# Patient Record
Sex: FEMALE | Race: WHITE | NOT HISPANIC OR LATINO | Employment: UNEMPLOYED | URBAN - METROPOLITAN AREA
[De-identification: names, ages, dates, MRNs, and addresses within clinical notes are randomized per-mention and may not be internally consistent; named-entity substitution may affect disease eponyms.]

---

## 2020-05-29 ENCOUNTER — OFFICE VISIT (OUTPATIENT)
Dept: SURGERY | Facility: CLINIC | Age: 25
End: 2020-05-29
Attending: COLON & RECTAL SURGERY
Payer: MEDICAID

## 2020-05-29 VITALS
BODY MASS INDEX: 21.01 KG/M2 | TEMPERATURE: 96 F | HEART RATE: 81 BPM | SYSTOLIC BLOOD PRESSURE: 105 MMHG | DIASTOLIC BLOOD PRESSURE: 58 MMHG | HEIGHT: 65 IN | WEIGHT: 126.13 LBS

## 2020-05-29 DIAGNOSIS — K51.011 ULCERATIVE PANCOLITIS WITH RECTAL BLEEDING: Primary | ICD-10-CM

## 2020-05-29 DIAGNOSIS — K52.9 INFLAMMATORY BOWEL DISEASE: ICD-10-CM

## 2020-05-29 DIAGNOSIS — Z01.818 PREOPERATIVE TESTING: Primary | ICD-10-CM

## 2020-05-29 PROCEDURE — 99203 PR OFFICE/OUTPT VISIT, NEW, LEVL III, 30-44 MIN: ICD-10-PCS | Mod: S$PBB,,, | Performed by: COLON & RECTAL SURGERY

## 2020-05-29 PROCEDURE — 99203 OFFICE O/P NEW LOW 30 MIN: CPT | Mod: S$PBB,,, | Performed by: COLON & RECTAL SURGERY

## 2020-05-29 PROCEDURE — 99999 PR PBB SHADOW E&M-NEW PATIENT-LVL IV: CPT | Mod: PBBFAC,,, | Performed by: COLON & RECTAL SURGERY

## 2020-05-29 PROCEDURE — 99204 OFFICE O/P NEW MOD 45 MIN: CPT | Mod: PBBFAC | Performed by: COLON & RECTAL SURGERY

## 2020-05-29 PROCEDURE — 99999 PR PBB SHADOW E&M-NEW PATIENT-LVL IV: ICD-10-PCS | Mod: PBBFAC,,, | Performed by: COLON & RECTAL SURGERY

## 2020-05-29 RX ORDER — NEOMYCIN SULFATE 500 MG/1
TABLET ORAL
Qty: 6 TABLET | Refills: 0 | Status: CANCELLED | OUTPATIENT
Start: 2020-05-29

## 2020-05-29 RX ORDER — BALSALAZIDE DISODIUM 750 MG/1
2250 CAPSULE ORAL 3 TIMES DAILY
Status: ON HOLD | COMMUNITY
End: 2020-11-09

## 2020-05-29 RX ORDER — PREDNISONE 50 MG/1
40 TABLET ORAL DAILY
Status: ON HOLD | COMMUNITY
End: 2020-06-15 | Stop reason: HOSPADM

## 2020-05-29 RX ORDER — METRONIDAZOLE 500 MG/1
TABLET ORAL
Qty: 3 TABLET | Refills: 0 | Status: ON HOLD | OUTPATIENT
Start: 2020-05-29 | End: 2020-06-15 | Stop reason: HOSPADM

## 2020-05-29 RX ORDER — METRONIDAZOLE 500 MG/1
TABLET ORAL
Qty: 3 TABLET | Refills: 0 | Status: CANCELLED | OUTPATIENT
Start: 2020-05-29

## 2020-05-29 RX ORDER — DICYCLOMINE HYDROCHLORIDE 20 MG/1
20 TABLET ORAL EVERY 6 HOURS
Status: ON HOLD | COMMUNITY
End: 2020-11-09

## 2020-05-29 RX ORDER — NEOMYCIN SULFATE 500 MG/1
TABLET ORAL
Qty: 6 TABLET | Refills: 0 | Status: ON HOLD | OUTPATIENT
Start: 2020-05-29 | End: 2020-06-15 | Stop reason: HOSPADM

## 2020-05-29 RX ORDER — POLYETHYLENE GLYCOL 3350 17 G/17G
POWDER, FOR SOLUTION ORAL
Qty: 238 G | Refills: 0 | Status: ON HOLD | OUTPATIENT
Start: 2020-05-29 | End: 2020-06-15 | Stop reason: HOSPADM

## 2020-05-29 RX ORDER — POLYETHYLENE GLYCOL 3350 17 G/17G
POWDER, FOR SOLUTION ORAL
Qty: 238 G | Refills: 0 | Status: CANCELLED | OUTPATIENT
Start: 2020-05-29

## 2020-05-29 NOTE — H&P (VIEW-ONLY)
CRS Office Visit History and Physical    Referring MD:   Parker Tobias Md  [address] [address]    SUBJECTIVE:     Chief Complaint: Ulcerative colitis    History of Present Illness:  The patient is new patient to this practice.   Course is as follows:  Patient is a 25 y.o. female presents for surgical management of ulcerative colitis.    Ulcerative colitis diagnosed 7 years ago.  Has been treated with multiple courses of prednisone.  Was on Remicade in the past, her insurance stopped paying for it.  Has also tried a Apriso and Imuran, which did not work per report.  Most recently on Tofacitinib and Balsalazide since 2018.  Most recent GI note states that she has self-discontinued these 2 medications and is currently on prednisone (40mg/day) and Balsalazide.  Before prednisone was having >7 BMs/day with blood and mucous in stool.  Also have cramping and nausea/emesis.  Has lost 30lbs since March. Has some trouble with control of liquid stool.  Had a prior vaginal delivery with suction and episiotomy (7 years ago)  No prior abdominal surgery. Denies smoking or EtOH.  Works for [employer].  Denies arthritis, rashes or perianal disease.  She lives with her boyfriend and 7-year-old daughter.  States that she has been with her boyfriend for a long time and he is a good source of support for her.    Most recent colonoscopy (March 2, 2020).  This demonstrated erythema, ulceration, and friability from the rectum to the splenic flexure.  Pathology from descending and sigmoid colon biopsies returned as chronic active colitis without granulomas.    Colonoscopy March 14, 2018- disease from rectum to mid descending colon  Colonoscopy January 2019- 'rectal scar tissue'    Most recent labs (4/30/3030)  HGB 10.5  Eddy cultures, Cdiff negative  Fecal calprotectin 1491    Family History of Colon Cancer / IBD: None    Review of patient's allergies indicates:  No Known Allergies    History reviewed. No pertinent past  "medical history.  History reviewed. No pertinent surgical history.  History reviewed. No pertinent family history.  Social History     Tobacco Use    Smoking status: Not on file   Substance Use Topics    Alcohol use: Not on file    Drug use: Not on file        Review of Systems:  Review of Systems   Constitutional: Positive for malaise/fatigue and weight loss.   Gastrointestinal: Positive for abdominal pain, blood in stool, diarrhea and nausea.   All other systems reviewed and are negative.      OBJECTIVE:     Vital Signs (Most Recent)  BP (!) 105/58 (BP Location: Left arm, Patient Position: Sitting, BP Method: Large (Automatic))   Pulse 81   Temp 96.4 °F (35.8 °C)   Ht 5' 5" (1.651 m)   Wt 57.2 kg (126 lb 1.7 oz)   BMI 20.98 kg/m²     Physical Exam:  General: White female in no distress   Neuro: Alert and oriented x 4.  Moves all extremities.     HEENT: No icterus.  Trachea midline  Respiratory: Respirations are even and unlabored  Cardiac: Regular rate  Abdomen:  Soft, nondistended, no palpable masses, no visible scars  Extremities: Warm dry and intact  Skin: No rashes  Anorectal:  No evidence of current or previous infection.  Digital exam with strong resting tone and squeeze.  No stool within the vault.      ASSESSMENT/PLAN:     25-year-old female with history of ulcerative colitis for 7 years.  She has tried Remicade, Imuran, Tofacitinib and Balsalazide with progression of disease.  Currently requiring 40 mg per day of prednisone with persistent symptoms.  She is not interested in any other trials of medical therapy at this time and would like to proceed with surgery.  She has been active in online support groups and has spoken with many patients who have had both proctocolectomy is with permanent ileostomy is as well as patients with J pouches.  She is interested in a J pouch at this time.  No clinical history concerning for Crohn's disease.    We discussed a 3 stage procedure for her given weight loss " and steroid requirement.  We discussed expected function with J-pouch in the best case scenario would be an average of 6 soft bowel movements per day with good control and occasional incontinence in the evening.  Also discussed that this would put her at risk for scar tissue in the pelvis which may affect her further fertility.  Discussed that if she were to become pregnant in the future we would recommend delivery with  rather than vaginal.  We also discussed the risk of being diagnosed with Crohn's in the future and how this would affect pouch function and risk for needing pouch excised.  Again, she had done a fair amount of research before meeting with me in none of this information came as a surprise to her.  We discussed the procedures themselves in detail using visual aids.    I have recommended starting with a laparoscopic total abdominal colectomy.  We discussed possible need for an open procedure.  We did discuss risks including bleeding, surgical site infection, Elizabeth stump leak, abscess, damage to surrounding structures including the ureter and small bowel, hernia, need for return to the operating room.  She did sign a surgical consent for this procedure today in clinic.  I did ask her if she had any additional questions about the procedure, which she did not.  I think it is unlikely that she will be able to wean off of steroids prior to surgery.  We will obtain an MR enterography prior to surgery to evaluate her small intestine for evidence of inflammation as well.  She will need to be seen by our enterostomal therapist for marking prior to surgery.    Delia Mehta MD  Staff Surgeon  Colon & Rectal Surgery

## 2020-05-29 NOTE — PROGRESS NOTES
CRS Office Visit History and Physical    Referring MD:   Parker Tobias Md  [address] [address]    SUBJECTIVE:     Chief Complaint: Ulcerative colitis    History of Present Illness:  The patient is new patient to this practice.   Course is as follows:  Patient is a 25 y.o. female presents for surgical management of ulcerative colitis.    Ulcerative colitis diagnosed 7 years ago.  Has been treated with multiple courses of prednisone.  Was on Remicade in the past, her insurance stopped paying for it.  Has also tried a Apriso and Imuran, which did not work per report.  Most recently on Tofacitinib and Balsalazide since 2018.  Most recent GI note states that she has self-discontinued these 2 medications and is currently on prednisone (40mg/day) and Balsalazide.  Before prednisone was having >7 BMs/day with blood and mucous in stool.  Also have cramping and nausea/emesis.  Has lost 30lbs since March. Has some trouble with control of liquid stool.  Had a prior vaginal delivery with suction and episiotomy (7 years ago)  No prior abdominal surgery. Denies smoking or EtOH.  Works for [employer].  Denies arthritis, rashes or perianal disease.  She lives with her boyfriend and 7-year-old daughter.  States that she has been with her boyfriend for a long time and he is a good source of support for her.    Most recent colonoscopy (March 2, 2020).  This demonstrated erythema, ulceration, and friability from the rectum to the splenic flexure.  Pathology from descending and sigmoid colon biopsies returned as chronic active colitis without granulomas.    Colonoscopy March 14, 2018- disease from rectum to mid descending colon  Colonoscopy January 2019- 'rectal scar tissue'    Most recent labs (4/30/3030)  HGB 10.5  Eddy cultures, Cdiff negative  Fecal calprotectin 1491    Family History of Colon Cancer / IBD: None    Review of patient's allergies indicates:  No Known Allergies    History reviewed. No pertinent past  "medical history.  History reviewed. No pertinent surgical history.  History reviewed. No pertinent family history.  Social History     Tobacco Use    Smoking status: Not on file   Substance Use Topics    Alcohol use: Not on file    Drug use: Not on file        Review of Systems:  Review of Systems   Constitutional: Positive for malaise/fatigue and weight loss.   Gastrointestinal: Positive for abdominal pain, blood in stool, diarrhea and nausea.   All other systems reviewed and are negative.      OBJECTIVE:     Vital Signs (Most Recent)  BP (!) 105/58 (BP Location: Left arm, Patient Position: Sitting, BP Method: Large (Automatic))   Pulse 81   Temp 96.4 °F (35.8 °C)   Ht 5' 5" (1.651 m)   Wt 57.2 kg (126 lb 1.7 oz)   BMI 20.98 kg/m²     Physical Exam:  General: White female in no distress   Neuro: Alert and oriented x 4.  Moves all extremities.     HEENT: No icterus.  Trachea midline  Respiratory: Respirations are even and unlabored  Cardiac: Regular rate  Abdomen:  Soft, nondistended, no palpable masses, no visible scars  Extremities: Warm dry and intact  Skin: No rashes  Anorectal:  No evidence of current or previous infection.  Digital exam with strong resting tone and squeeze.  No stool within the vault.      ASSESSMENT/PLAN:     25-year-old female with history of ulcerative colitis for 7 years.  She has tried Remicade, Imuran, Tofacitinib and Balsalazide with progression of disease.  Currently requiring 40 mg per day of prednisone with persistent symptoms.  She is not interested in any other trials of medical therapy at this time and would like to proceed with surgery.  She has been active in online support groups and has spoken with many patients who have had both proctocolectomy is with permanent ileostomy is as well as patients with J pouches.  She is interested in a J pouch at this time.  No clinical history concerning for Crohn's disease.    We discussed a 3 stage procedure for her given weight loss " and steroid requirement.  We discussed expected function with J-pouch in the best case scenario would be an average of 6 soft bowel movements per day with good control and occasional incontinence in the evening.  Also discussed that this would put her at risk for scar tissue in the pelvis which may affect her further fertility.  Discussed that if she were to become pregnant in the future we would recommend delivery with  rather than vaginal.  We also discussed the risk of being diagnosed with Crohn's in the future and how this would affect pouch function and risk for needing pouch excised.  Again, she had done a fair amount of research before meeting with me in none of this information came as a surprise to her.  We discussed the procedures themselves in detail using visual aids.    I have recommended starting with a laparoscopic total abdominal colectomy.  We discussed possible need for an open procedure.  We did discuss risks including bleeding, surgical site infection, Elizabeth stump leak, abscess, damage to surrounding structures including the ureter and small bowel, hernia, need for return to the operating room.  She did sign a surgical consent for this procedure today in clinic.  I did ask her if she had any additional questions about the procedure, which she did not.  I think it is unlikely that she will be able to wean off of steroids prior to surgery.  We will obtain an MR enterography prior to surgery to evaluate her small intestine for evidence of inflammation as well.  She will need to be seen by our enterostomal therapist for marking prior to surgery.    Delia Mehta MD  Staff Surgeon  Colon & Rectal Surgery

## 2020-05-29 NOTE — Clinical Note
Hey-She is schedule for a total colectomy w/ ileostomy w/ me on 6/11/2020.  Can you make sure she is on your schedule for marking pre-op?Thanks!Hoa

## 2020-05-29 NOTE — LETTER
May 29, 2020      Parker Tobias MD  [address]  [facility] [address]           [facility]  [address]  [facility] [address]  Phone: [phone]          Patient: Alissa Mitchell   MR Number: [id number]   YOB: 1995   Date of Visit: 5/29/2020       Dear Dr. Parker Tobias:    Thank you for referring Alissa Mitchell to me for evaluation. Attached you will find relevant portions of my assessment and plan of care.    If you have questions, please do not hesitate to call me. I look forward to following Alissa Mitchell along with you.    Sincerely,    Delia Mehta MD    Enclosure  CC:  No Recipients    If you would like to receive this communication electronically, please contact [email or web address] or [phone] to request more information on [employer] access.    For providers and/or their staff who would like to refer a patient to [facility], please contact us through our one-stop-shop provider referral line, [facility], at [phone].    If you feel you have received this communication in error or would no longer like to receive these types of communications, please e-mail [email or web address]

## 2020-06-01 ENCOUNTER — TELEPHONE (OUTPATIENT)
Dept: SURGERY | Facility: CLINIC | Age: 25
End: 2020-06-01

## 2020-06-01 DIAGNOSIS — K51.00 ULCERATIVE PANCOLITIS: ICD-10-CM

## 2020-06-01 DIAGNOSIS — K51.011 ULCERATIVE PANCOLITIS WITH RECTAL BLEEDING: Primary | ICD-10-CM

## 2020-06-01 RX ORDER — SODIUM CHLORIDE 9 MG/ML
INJECTION, SOLUTION INTRAVENOUS CONTINUOUS
Status: CANCELLED | OUTPATIENT
Start: 2020-06-01

## 2020-06-01 RX ORDER — GABAPENTIN 100 MG/1
300 CAPSULE ORAL
Status: CANCELLED | OUTPATIENT
Start: 2020-06-01

## 2020-06-01 RX ORDER — METRONIDAZOLE 500 MG/100ML
500 INJECTION, SOLUTION INTRAVENOUS
Status: CANCELLED | OUTPATIENT
Start: 2020-06-01

## 2020-06-01 RX ORDER — MUPIROCIN 20 MG/G
1 OINTMENT TOPICAL
Status: CANCELLED | OUTPATIENT
Start: 2020-06-01

## 2020-06-01 RX ORDER — SODIUM CHLORIDE 9 MG/ML
INJECTION, SOLUTION INTRAVENOUS
Status: CANCELLED | OUTPATIENT
Start: 2020-06-01

## 2020-06-01 RX ORDER — ACETAMINOPHEN 650 MG/20.3ML
975 LIQUID ORAL
Status: CANCELLED | OUTPATIENT
Start: 2020-06-01

## 2020-06-01 RX ORDER — TRIPROLIDINE/PSEUDOEPHEDRINE 2.5MG-60MG
600 TABLET ORAL
Status: CANCELLED | OUTPATIENT
Start: 2020-06-01

## 2020-06-01 RX ORDER — GABAPENTIN 100 MG/1
300 CAPSULE ORAL 3 TIMES DAILY
Status: CANCELLED | OUTPATIENT
Start: 2020-06-01

## 2020-06-01 RX ORDER — LIDOCAINE HYDROCHLORIDE 10 MG/ML
1 INJECTION, SOLUTION EPIDURAL; INFILTRATION; INTRACAUDAL; PERINEURAL
Status: CANCELLED | OUTPATIENT
Start: 2020-06-01

## 2020-06-01 RX ORDER — HEPARIN SODIUM 5000 [USP'U]/ML
5000 INJECTION, SOLUTION INTRAVENOUS; SUBCUTANEOUS EVERY 8 HOURS
Status: CANCELLED | OUTPATIENT
Start: 2020-06-01 | End: 2020-06-01

## 2020-06-09 ENCOUNTER — HOSPITAL ENCOUNTER (OUTPATIENT)
Dept: RADIOLOGY | Facility: HOSPITAL | Age: 25
Discharge: HOME | End: 2020-06-09
Attending: COLON & RECTAL SURGERY
Payer: MEDICAID

## 2020-06-09 ENCOUNTER — LAB VISIT (OUTPATIENT)
Dept: INTERNAL MEDICINE | Facility: CLINIC | Age: 25
End: 2020-06-09
Attending: COLON & RECTAL SURGERY
Payer: MEDICAID

## 2020-06-09 DIAGNOSIS — K52.9 INFLAMMATORY BOWEL DISEASE: ICD-10-CM

## 2020-06-09 DIAGNOSIS — Z01.818 PREOPERATIVE TESTING: ICD-10-CM

## 2020-06-09 PROCEDURE — 72197 MRI PELVIS W/O & W/DYE: CPT | Mod: TC

## 2020-06-09 PROCEDURE — 72197 MRI ENTEROGRAPHY: ICD-10-PCS | Mod: 26,,, | Performed by: RADIOLOGY

## 2020-06-09 PROCEDURE — A9585 GADOBUTROL INJECTION: HCPCS | Performed by: COLON & RECTAL SURGERY

## 2020-06-09 PROCEDURE — 72197 MRI PELVIS W/O & W/DYE: CPT | Mod: 26,,, | Performed by: RADIOLOGY

## 2020-06-09 PROCEDURE — 74183 MRI ENTEROGRAPHY: ICD-10-PCS | Mod: 26,,, | Performed by: RADIOLOGY

## 2020-06-09 PROCEDURE — U0003 INFECTIOUS AGENT DETECTION BY NUCLEIC ACID (DNA OR RNA); SEVERE ACUTE RESPIRATORY SYNDROME CORONAVIRUS 2 (SARS-COV-2) (CORONAVIRUS DISEASE [COVID-19]), AMPLIFIED PROBE TECHNIQUE, MAKING USE OF HIGH THROUGHPUT TECHNOLOGIES AS DESCRIBED BY CMS-2020-01-R: HCPCS

## 2020-06-09 PROCEDURE — 74183 MRI ABD W/O CNTR FLWD CNTR: CPT | Mod: 26,,, | Performed by: RADIOLOGY

## 2020-06-09 PROCEDURE — 25500020 PHARM REV CODE 255: Performed by: COLON & RECTAL SURGERY

## 2020-06-09 RX ORDER — GADOBUTROL 604.72 MG/ML
10 INJECTION INTRAVENOUS
Status: COMPLETED | OUTPATIENT
Start: 2020-06-09 | End: 2020-06-09

## 2020-06-09 RX ADMIN — GADOBUTROL 10 ML: 604.72 INJECTION INTRAVENOUS at 11:06

## 2020-06-10 ENCOUNTER — TELEPHONE (OUTPATIENT)
Dept: SURGERY | Facility: CLINIC | Age: 25
End: 2020-06-10

## 2020-06-10 LAB — SARS-COV-2 RNA RESP QL NAA+PROBE: NOT DETECTED

## 2020-06-10 RX ORDER — PROMETHAZINE HYDROCHLORIDE 25 MG/1
25 TABLET ORAL EVERY 6 HOURS PRN
Status: ON HOLD | COMMUNITY
End: 2020-11-09

## 2020-06-10 NOTE — PRE-PROCEDURE INSTRUCTIONS
PREOP INSTRUCTIONS:No solid food ,milk or milk products for 8 hours prior to procedure.Clear liquids are allowed up to 2 hours before procedure.Clear liquids are:water,apple juice,gatorade & powerade.Shower instructions as well as directions to the Day of Surgery Center were given.Patient encouraged to wear loose fitting,comfortable clothing.Medication instructions for pm prior to and am of procedure reviewed.Instructed patient to avoid taking vitamins,supplements,aspirin and ibuprofen the morning of surgery. Patient stated an understanding.Patient instructed to take temperature the night before surgery as well as the morning of surgery and to notify [facility] at [phone] if it is 100.4 or above.Patient also informed of the current visitor policy and advised patient that one visitor may accompany them into the hospital and wait (socially distanced) .When they enter the hospital both patient and visitor will have their temperature checked,provided a mask and provided assistance to their destination. Inpatients are allowed 1 visitor/day from 10 am until 6 pm.     Covid screening completed 6/9/2020 and results are pending.  Patient denies any side effects or issues with anesthesia or sedation.    [facility]olph [facility] WILL BE PROVIDING TRANSPORTATION HOME UPON DISCHARGE.    Pt will report to Wound Care for marking and then to [facility]

## 2020-06-11 ENCOUNTER — ANESTHESIA EVENT (OUTPATIENT)
Dept: SURGERY | Facility: HOSPITAL | Age: 25
DRG: 331 | End: 2020-06-11
Payer: MEDICAID

## 2020-06-11 ENCOUNTER — HOSPITAL ENCOUNTER (INPATIENT)
Facility: HOSPITAL | Age: 25
LOS: 4 days | Discharge: HOME | DRG: 331 | End: 2020-06-15
Attending: COLON & RECTAL SURGERY | Admitting: COLON & RECTAL SURGERY
Payer: MEDICAID

## 2020-06-11 ENCOUNTER — ANESTHESIA (OUTPATIENT)
Dept: SURGERY | Facility: HOSPITAL | Age: 25
DRG: 331 | End: 2020-06-11
Payer: MEDICAID

## 2020-06-11 ENCOUNTER — OFFICE VISIT (OUTPATIENT)
Dept: WOUND CARE | Facility: CLINIC | Age: 25
DRG: 331 | End: 2020-06-11
Payer: MEDICAID

## 2020-06-11 DIAGNOSIS — K51.011 ULCERATIVE PANCOLITIS WITH RECTAL BLEEDING: ICD-10-CM

## 2020-06-11 DIAGNOSIS — K51.00 ULCERATIVE PANCOLITIS: ICD-10-CM

## 2020-06-11 DIAGNOSIS — Z43.2 ATTENTION TO ILEOSTOMY: Primary | ICD-10-CM

## 2020-06-11 DIAGNOSIS — Z71.89 ENCOUNTER FOR OSTOMY CARE EDUCATION: ICD-10-CM

## 2020-06-11 PROBLEM — K51.90 ULCERATIVE COLITIS: Status: ACTIVE | Noted: 2020-06-11

## 2020-06-11 LAB
B-HCG UR QL: NEGATIVE
CTP QC/QA: YES

## 2020-06-11 PROCEDURE — 63600175 PHARM REV CODE 636 W HCPCS: Performed by: NURSE PRACTITIONER

## 2020-06-11 PROCEDURE — 99024 PR POST-OP FOLLOW-UP VISIT: ICD-10-PCS | Mod: ,,, | Performed by: CLINICAL NURSE SPECIALIST

## 2020-06-11 PROCEDURE — 36000711: Performed by: COLON & RECTAL SURGERY

## 2020-06-11 PROCEDURE — 71000016 HC POSTOP RECOV ADDL HR: Performed by: COLON & RECTAL SURGERY

## 2020-06-11 PROCEDURE — 25000003 PHARM REV CODE 250: Performed by: NURSE ANESTHETIST, CERTIFIED REGISTERED

## 2020-06-11 PROCEDURE — 63600175 PHARM REV CODE 636 W HCPCS: Performed by: STUDENT IN AN ORGANIZED HEALTH CARE EDUCATION/TRAINING PROGRAM

## 2020-06-11 PROCEDURE — C1765 ADHESION BARRIER: HCPCS | Performed by: COLON & RECTAL SURGERY

## 2020-06-11 PROCEDURE — 88307 TISSUE EXAM BY PATHOLOGIST: CPT | Mod: 26,,, | Performed by: PATHOLOGY

## 2020-06-11 PROCEDURE — D9220A PRA ANESTHESIA: ICD-10-PCS | Mod: ANES,,, | Performed by: ANESTHESIOLOGY

## 2020-06-11 PROCEDURE — 63600175 PHARM REV CODE 636 W HCPCS: Performed by: NURSE ANESTHETIST, CERTIFIED REGISTERED

## 2020-06-11 PROCEDURE — 27100025 HC TUBING, SET FLUID WARMER: Performed by: ANESTHESIOLOGY

## 2020-06-11 PROCEDURE — 37000008 HC ANESTHESIA 1ST 15 MINUTES: Performed by: COLON & RECTAL SURGERY

## 2020-06-11 PROCEDURE — 44150 PR REMOVAL COLON/ILEOSTOMY: ICD-10-PCS | Mod: ,,, | Performed by: COLON & RECTAL SURGERY

## 2020-06-11 PROCEDURE — 81025 URINE PREGNANCY TEST: CPT | Performed by: COLON & RECTAL SURGERY

## 2020-06-11 PROCEDURE — 63600175 PHARM REV CODE 636 W HCPCS: Performed by: ANESTHESIOLOGY

## 2020-06-11 PROCEDURE — 25000003 PHARM REV CODE 250: Performed by: STUDENT IN AN ORGANIZED HEALTH CARE EDUCATION/TRAINING PROGRAM

## 2020-06-11 PROCEDURE — 71000033 HC RECOVERY, INTIAL HOUR: Performed by: COLON & RECTAL SURGERY

## 2020-06-11 PROCEDURE — 37000009 HC ANESTHESIA EA ADD 15 MINS: Performed by: COLON & RECTAL SURGERY

## 2020-06-11 PROCEDURE — 44150 REMOVAL OF COLON: CPT | Mod: ,,, | Performed by: COLON & RECTAL SURGERY

## 2020-06-11 PROCEDURE — 36000710: Performed by: COLON & RECTAL SURGERY

## 2020-06-11 PROCEDURE — 27201423 OPTIME MED/SURG SUP & DEVICES STERILE SUPPLY: Performed by: COLON & RECTAL SURGERY

## 2020-06-11 PROCEDURE — 88307 PR  SURG PATH,LEVEL V: ICD-10-PCS | Mod: 26,,, | Performed by: PATHOLOGY

## 2020-06-11 PROCEDURE — 25000003 PHARM REV CODE 250: Performed by: ANESTHESIOLOGY

## 2020-06-11 PROCEDURE — 88307 TISSUE EXAM BY PATHOLOGIST: CPT | Performed by: PATHOLOGY

## 2020-06-11 PROCEDURE — 71000015 HC POSTOP RECOV 1ST HR: Performed by: COLON & RECTAL SURGERY

## 2020-06-11 PROCEDURE — 99999 PR PBB SHADOW E&M-EST. PATIENT-LVL I: CPT | Mod: PBBFAC,,, | Performed by: CLINICAL NURSE SPECIALIST

## 2020-06-11 PROCEDURE — D9220A PRA ANESTHESIA: Mod: CRNA,,, | Performed by: NURSE ANESTHETIST, CERTIFIED REGISTERED

## 2020-06-11 PROCEDURE — 20600001 HC STEP DOWN PRIVATE ROOM

## 2020-06-11 PROCEDURE — 25000003 PHARM REV CODE 250: Performed by: NURSE PRACTITIONER

## 2020-06-11 PROCEDURE — D9220A PRA ANESTHESIA: ICD-10-PCS | Mod: CRNA,,, | Performed by: NURSE ANESTHETIST, CERTIFIED REGISTERED

## 2020-06-11 PROCEDURE — S0030 INJECTION, METRONIDAZOLE: HCPCS | Performed by: NURSE PRACTITIONER

## 2020-06-11 PROCEDURE — 27201037 HC PRESSURE MONITORING SET UP

## 2020-06-11 PROCEDURE — 99024 POSTOP FOLLOW-UP VISIT: CPT | Mod: ,,, | Performed by: CLINICAL NURSE SPECIALIST

## 2020-06-11 PROCEDURE — D9220A PRA ANESTHESIA: Mod: ANES,,, | Performed by: ANESTHESIOLOGY

## 2020-06-11 PROCEDURE — 99211 OFF/OP EST MAY X REQ PHY/QHP: CPT | Mod: PBBFAC,25 | Performed by: CLINICAL NURSE SPECIALIST

## 2020-06-11 PROCEDURE — C9290 INJ, BUPIVACAINE LIPOSOME: HCPCS | Performed by: COLON & RECTAL SURGERY

## 2020-06-11 PROCEDURE — 99999 PR PBB SHADOW E&M-EST. PATIENT-LVL I: ICD-10-PCS | Mod: PBBFAC,,, | Performed by: CLINICAL NURSE SPECIALIST

## 2020-06-11 PROCEDURE — 63600175 PHARM REV CODE 636 W HCPCS: Performed by: COLON & RECTAL SURGERY

## 2020-06-11 DEVICE — MEMBRANE SEPRAFILM 5 X 6: Type: IMPLANTABLE DEVICE | Site: ABDOMEN | Status: FUNCTIONAL

## 2020-06-11 RX ORDER — GABAPENTIN 300 MG/1
300 CAPSULE ORAL 3 TIMES DAILY
Status: DISCONTINUED | OUTPATIENT
Start: 2020-06-11 | End: 2020-06-15 | Stop reason: HOSPADM

## 2020-06-11 RX ORDER — LIDOCAINE HYDROCHLORIDE 10 MG/ML
1 INJECTION, SOLUTION EPIDURAL; INFILTRATION; INTRACAUDAL; PERINEURAL
Status: COMPLETED | OUTPATIENT
Start: 2020-06-11 | End: 2020-06-11

## 2020-06-11 RX ORDER — PHENYLEPHRINE HYDROCHLORIDE 10 MG/ML
INJECTION INTRAVENOUS
Status: DISCONTINUED | OUTPATIENT
Start: 2020-06-11 | End: 2020-06-11

## 2020-06-11 RX ORDER — MUPIROCIN 20 MG/G
1 OINTMENT TOPICAL
Status: COMPLETED | OUTPATIENT
Start: 2020-06-11 | End: 2020-06-11

## 2020-06-11 RX ORDER — MIDAZOLAM HYDROCHLORIDE 1 MG/ML
INJECTION, SOLUTION INTRAMUSCULAR; INTRAVENOUS
Status: DISCONTINUED | OUTPATIENT
Start: 2020-06-11 | End: 2020-06-11

## 2020-06-11 RX ORDER — PROPOFOL 10 MG/ML
INJECTION, EMULSION INTRAVENOUS
Status: DISCONTINUED | OUTPATIENT
Start: 2020-06-11 | End: 2020-06-11

## 2020-06-11 RX ORDER — SODIUM CHLORIDE 9 MG/ML
INJECTION, SOLUTION INTRAVENOUS CONTINUOUS
Status: DISCONTINUED | OUTPATIENT
Start: 2020-06-11 | End: 2020-06-15 | Stop reason: HOSPADM

## 2020-06-11 RX ORDER — SODIUM CHLORIDE 9 MG/ML
INJECTION, SOLUTION INTRAVENOUS CONTINUOUS
Status: DISCONTINUED | OUTPATIENT
Start: 2020-06-11 | End: 2020-06-12

## 2020-06-11 RX ORDER — ROCURONIUM BROMIDE 10 MG/ML
INJECTION, SOLUTION INTRAVENOUS
Status: DISCONTINUED | OUTPATIENT
Start: 2020-06-11 | End: 2020-06-11

## 2020-06-11 RX ORDER — HYDROMORPHONE HYDROCHLORIDE 1 MG/ML
0.2 INJECTION, SOLUTION INTRAMUSCULAR; INTRAVENOUS; SUBCUTANEOUS EVERY 5 MIN PRN
Status: COMPLETED | OUTPATIENT
Start: 2020-06-11 | End: 2020-06-11

## 2020-06-11 RX ORDER — ONDANSETRON 2 MG/ML
4 INJECTION INTRAMUSCULAR; INTRAVENOUS ONCE AS NEEDED
Status: COMPLETED | OUTPATIENT
Start: 2020-06-11 | End: 2020-06-11

## 2020-06-11 RX ORDER — SODIUM CHLORIDE 9 MG/ML
INJECTION, SOLUTION INTRAVENOUS
Status: COMPLETED | OUTPATIENT
Start: 2020-06-11 | End: 2020-06-11

## 2020-06-11 RX ORDER — SODIUM CHLORIDE 0.9 % (FLUSH) 0.9 %
10 SYRINGE (ML) INJECTION
Status: DISCONTINUED | OUTPATIENT
Start: 2020-06-11 | End: 2020-06-15 | Stop reason: HOSPADM

## 2020-06-11 RX ORDER — TRAMADOL HYDROCHLORIDE 50 MG/1
50 TABLET ORAL EVERY 6 HOURS PRN
Status: DISCONTINUED | OUTPATIENT
Start: 2020-06-11 | End: 2020-06-15 | Stop reason: HOSPADM

## 2020-06-11 RX ORDER — SODIUM CHLORIDE 0.9 % (FLUSH) 0.9 %
3 SYRINGE (ML) INJECTION
Status: DISCONTINUED | OUTPATIENT
Start: 2020-06-11 | End: 2020-06-11 | Stop reason: HOSPADM

## 2020-06-11 RX ORDER — IBUPROFEN 400 MG/1
800 TABLET ORAL EVERY 8 HOURS
Status: DISCONTINUED | OUTPATIENT
Start: 2020-06-12 | End: 2020-06-15 | Stop reason: HOSPADM

## 2020-06-11 RX ORDER — ACETAMINOPHEN 10 MG/ML
1000 INJECTION, SOLUTION INTRAVENOUS EVERY 8 HOURS
Status: COMPLETED | OUTPATIENT
Start: 2020-06-11 | End: 2020-06-12

## 2020-06-11 RX ORDER — OXYCODONE HYDROCHLORIDE 5 MG/1
5 TABLET ORAL EVERY 6 HOURS PRN
Status: DISCONTINUED | OUTPATIENT
Start: 2020-06-11 | End: 2020-06-15 | Stop reason: HOSPADM

## 2020-06-11 RX ORDER — METRONIDAZOLE 500 MG/100ML
500 INJECTION, SOLUTION INTRAVENOUS
Status: COMPLETED | OUTPATIENT
Start: 2020-06-11 | End: 2020-06-11

## 2020-06-11 RX ORDER — ACETAMINOPHEN 500 MG
1000 TABLET ORAL EVERY 8 HOURS
Status: DISCONTINUED | OUTPATIENT
Start: 2020-06-12 | End: 2020-06-15 | Stop reason: HOSPADM

## 2020-06-11 RX ORDER — ACETAMINOPHEN 650 MG/20.3ML
975 LIQUID ORAL
Status: COMPLETED | OUTPATIENT
Start: 2020-06-11 | End: 2020-06-11

## 2020-06-11 RX ORDER — HEPARIN SODIUM 5000 [USP'U]/ML
5000 INJECTION, SOLUTION INTRAVENOUS; SUBCUTANEOUS EVERY 8 HOURS
Status: COMPLETED | OUTPATIENT
Start: 2020-06-11 | End: 2020-06-11

## 2020-06-11 RX ORDER — DEXMEDETOMIDINE HYDROCHLORIDE 100 UG/ML
INJECTION, SOLUTION INTRAVENOUS
Status: DISCONTINUED | OUTPATIENT
Start: 2020-06-11 | End: 2020-06-11

## 2020-06-11 RX ORDER — TRIPROLIDINE/PSEUDOEPHEDRINE 2.5MG-60MG
600 TABLET ORAL
Status: COMPLETED | OUTPATIENT
Start: 2020-06-11 | End: 2020-06-11

## 2020-06-11 RX ORDER — MUPIROCIN 20 MG/G
OINTMENT TOPICAL 2 TIMES DAILY
Status: DISCONTINUED | OUTPATIENT
Start: 2020-06-11 | End: 2020-06-15 | Stop reason: HOSPADM

## 2020-06-11 RX ORDER — ONDANSETRON 2 MG/ML
4 INJECTION INTRAMUSCULAR; INTRAVENOUS EVERY 6 HOURS PRN
Status: DISCONTINUED | OUTPATIENT
Start: 2020-06-11 | End: 2020-06-15 | Stop reason: HOSPADM

## 2020-06-11 RX ORDER — LIDOCAINE HYDROCHLORIDE ANHYDROUS AND DEXTROSE MONOHYDRATE .8; 5 G/100ML; G/100ML
INJECTION, SOLUTION INTRAVENOUS CONTINUOUS PRN
Status: DISCONTINUED | OUTPATIENT
Start: 2020-06-11 | End: 2020-06-11

## 2020-06-11 RX ORDER — LIDOCAINE HCL/PF 100 MG/5ML
SYRINGE (ML) INTRAVENOUS
Status: DISCONTINUED | OUTPATIENT
Start: 2020-06-11 | End: 2020-06-11

## 2020-06-11 RX ORDER — ONDANSETRON 2 MG/ML
INJECTION INTRAMUSCULAR; INTRAVENOUS
Status: DISCONTINUED | OUTPATIENT
Start: 2020-06-11 | End: 2020-06-11

## 2020-06-11 RX ORDER — OXYCODONE HYDROCHLORIDE 10 MG/1
10 TABLET ORAL EVERY 4 HOURS PRN
Status: DISCONTINUED | OUTPATIENT
Start: 2020-06-11 | End: 2020-06-15 | Stop reason: HOSPADM

## 2020-06-11 RX ORDER — SODIUM CHLORIDE 9 MG/ML
INJECTION, SOLUTION INTRAVENOUS CONTINUOUS PRN
Status: DISCONTINUED | OUTPATIENT
Start: 2020-06-11 | End: 2020-06-11

## 2020-06-11 RX ORDER — KETAMINE HCL IN 0.9 % NACL 50 MG/5 ML
SYRINGE (ML) INTRAVENOUS
Status: DISCONTINUED | OUTPATIENT
Start: 2020-06-11 | End: 2020-06-11

## 2020-06-11 RX ORDER — GABAPENTIN 300 MG/1
300 CAPSULE ORAL
Status: COMPLETED | OUTPATIENT
Start: 2020-06-11 | End: 2020-06-11

## 2020-06-11 RX ORDER — DEXAMETHASONE SODIUM PHOSPHATE 4 MG/ML
INJECTION, SOLUTION INTRA-ARTICULAR; INTRALESIONAL; INTRAMUSCULAR; INTRAVENOUS; SOFT TISSUE
Status: DISCONTINUED | OUTPATIENT
Start: 2020-06-11 | End: 2020-06-11

## 2020-06-11 RX ORDER — FENTANYL CITRATE 50 UG/ML
INJECTION, SOLUTION INTRAMUSCULAR; INTRAVENOUS
Status: DISCONTINUED | OUTPATIENT
Start: 2020-06-11 | End: 2020-06-11

## 2020-06-11 RX ADMIN — METRONIDAZOLE 500 MG: 500 SOLUTION INTRAVENOUS at 04:06

## 2020-06-11 RX ADMIN — DEXMEDETOMIDINE HYDROCHLORIDE 24 MCG: 100 INJECTION, SOLUTION, CONCENTRATE INTRAVENOUS at 07:06

## 2020-06-11 RX ADMIN — HYDROMORPHONE HYDROCHLORIDE 0.2 MG: 1 INJECTION, SOLUTION INTRAMUSCULAR; INTRAVENOUS; SUBCUTANEOUS at 08:06

## 2020-06-11 RX ADMIN — SODIUM CHLORIDE: 0.9 INJECTION, SOLUTION INTRAVENOUS at 08:06

## 2020-06-11 RX ADMIN — PHENYLEPHRINE HYDROCHLORIDE 100 MCG: 10 INJECTION INTRAVENOUS at 06:06

## 2020-06-11 RX ADMIN — FENTANYL CITRATE 100 MCG: 50 INJECTION, SOLUTION INTRAMUSCULAR; INTRAVENOUS at 03:06

## 2020-06-11 RX ADMIN — ONDANSETRON 4 MG: 2 INJECTION, SOLUTION INTRAMUSCULAR; INTRAVENOUS at 05:06

## 2020-06-11 RX ADMIN — GABAPENTIN 300 MG: 300 CAPSULE ORAL at 07:06

## 2020-06-11 RX ADMIN — SODIUM CHLORIDE: 0.9 INJECTION, SOLUTION INTRAVENOUS at 03:06

## 2020-06-11 RX ADMIN — GABAPENTIN 300 MG: 300 CAPSULE ORAL at 01:06

## 2020-06-11 RX ADMIN — MUPIROCIN: 20 OINTMENT TOPICAL at 08:06

## 2020-06-11 RX ADMIN — HYDROMORPHONE HYDROCHLORIDE 0.2 MG: 1 INJECTION, SOLUTION INTRAMUSCULAR; INTRAVENOUS; SUBCUTANEOUS at 07:06

## 2020-06-11 RX ADMIN — SODIUM CHLORIDE, SODIUM GLUCONATE, SODIUM ACETATE, POTASSIUM CHLORIDE, MAGNESIUM CHLORIDE, SODIUM PHOSPHATE, DIBASIC, AND POTASSIUM PHOSPHATE: .53; .5; .37; .037; .03; .012; .00082 INJECTION, SOLUTION INTRAVENOUS at 05:06

## 2020-06-11 RX ADMIN — PROMETHAZINE HYDROCHLORIDE 6.25 MG: 25 INJECTION INTRAMUSCULAR; INTRAVENOUS at 08:06

## 2020-06-11 RX ADMIN — SODIUM CHLORIDE: 0.9 INJECTION, SOLUTION INTRAVENOUS at 01:06

## 2020-06-11 RX ADMIN — Medication 15 MG: at 04:06

## 2020-06-11 RX ADMIN — ROCURONIUM BROMIDE 50 MG: 10 INJECTION, SOLUTION INTRAVENOUS at 03:06

## 2020-06-11 RX ADMIN — LIDOCAINE HYDROCHLORIDE 0.3 MG: 10 INJECTION, SOLUTION EPIDURAL; INFILTRATION; INTRACAUDAL; PERINEURAL at 01:06

## 2020-06-11 RX ADMIN — HEPARIN SODIUM 5000 UNITS: 5000 INJECTION INTRAVENOUS; SUBCUTANEOUS at 03:06

## 2020-06-11 RX ADMIN — MIDAZOLAM HYDROCHLORIDE 2 MG: 1 INJECTION, SOLUTION INTRAMUSCULAR; INTRAVENOUS at 03:06

## 2020-06-11 RX ADMIN — LIDOCAINE HYDROCHLORIDE 0.02 MG/KG/MIN: 8 INJECTION, SOLUTION INTRAVENOUS at 03:06

## 2020-06-11 RX ADMIN — ONDANSETRON 4 MG: 2 INJECTION, SOLUTION INTRAMUSCULAR; INTRAVENOUS at 07:06

## 2020-06-11 RX ADMIN — SODIUM CHLORIDE, SODIUM GLUCONATE, SODIUM ACETATE, POTASSIUM CHLORIDE, MAGNESIUM CHLORIDE, SODIUM PHOSPHATE, DIBASIC, AND POTASSIUM PHOSPHATE: .53; .5; .37; .037; .03; .012; .00082 INJECTION, SOLUTION INTRAVENOUS at 03:06

## 2020-06-11 RX ADMIN — Medication 10 MG: at 04:06

## 2020-06-11 RX ADMIN — SUGAMMADEX 200 MG: 100 INJECTION, SOLUTION INTRAVENOUS at 06:06

## 2020-06-11 RX ADMIN — ROCURONIUM BROMIDE 10 MG: 10 INJECTION, SOLUTION INTRAVENOUS at 05:06

## 2020-06-11 RX ADMIN — LIDOCAINE HYDROCHLORIDE 100 MG: 20 INJECTION, SOLUTION INTRAVENOUS at 03:06

## 2020-06-11 RX ADMIN — ACETAMINOPHEN 976.6 MG: 160 SOLUTION ORAL at 01:06

## 2020-06-11 RX ADMIN — IBUPROFEN 600 MG: 100 SUSPENSION ORAL at 01:06

## 2020-06-11 RX ADMIN — Medication 15 MG: at 03:06

## 2020-06-11 RX ADMIN — ACETAMINOPHEN 1000 MG: 10 INJECTION, SOLUTION INTRAVENOUS at 08:06

## 2020-06-11 RX ADMIN — OXYCODONE HYDROCHLORIDE 10 MG: 10 TABLET ORAL at 07:06

## 2020-06-11 RX ADMIN — MUPIROCIN 1 G: 20 OINTMENT TOPICAL at 01:06

## 2020-06-11 RX ADMIN — CEFTRIAXONE 2 G: 2 INJECTION, SOLUTION INTRAVENOUS at 01:06

## 2020-06-11 RX ADMIN — OXYCODONE HYDROCHLORIDE 10 MG: 10 TABLET ORAL at 11:06

## 2020-06-11 RX ADMIN — IBUPROFEN 800 MG: 800 INJECTION INTRAVENOUS at 10:06

## 2020-06-11 RX ADMIN — Medication 10 MG: at 05:06

## 2020-06-11 RX ADMIN — PROPOFOL 200 MG: 10 INJECTION, EMULSION INTRAVENOUS at 03:06

## 2020-06-11 RX ADMIN — ROCURONIUM BROMIDE 10 MG: 10 INJECTION, SOLUTION INTRAVENOUS at 04:06

## 2020-06-11 RX ADMIN — DEXAMETHASONE SODIUM PHOSPHATE 8 MG: 4 INJECTION, SOLUTION INTRAMUSCULAR; INTRAVENOUS at 04:06

## 2020-06-11 NOTE — PLAN OF CARE
Patient assigned to this writer by charge nurse. The patient was  escorted to [facility] room 19. The patient is currently  changing into a hospital gown. This writer is completing a chart review and reviewing MD orders.

## 2020-06-11 NOTE — PROGRESS NOTES
Pre op Ostomy marking:    This patient was seen today per request  in preparation for upcoming ostomy surgery on 6/11/20  Stoma marking/siting was performed per protocol and patient marked with a permanent marker and skin staining with silver nitrate.   Lives in [place]   The proposed surgery was discussed and patient educated on expected postoperative course and expectations. The patient was allowed to ask questions and was also given pre-op information kit from  the [employer] for review at home prior to surgery.

## 2020-06-11 NOTE — ANESTHESIA PREPROCEDURE EVALUATION
06/11/2020  Pre-operative evaluation for Procedure(s) (LRB):  COLECTOMY, TOTAL, LAPAROSCOPIC, ERAS high (N/A)    Alissa Mitchell is a 25 y.o. female     Patient Active Problem List   Diagnosis    Ulcerative colitis    Ulcerative pancolitis       Review of patient's allergies indicates:  No Known Allergies    No current facility-administered medications on file prior to encounter.      Current Outpatient Medications on File Prior to Encounter   Medication Sig Dispense Refill    balsalazide (COLAZAL) 750 mg capsule Take 2,250 mg by mouth 3 (three) times daily.      dicyclomine (BENTYL) 20 mg tablet Take 20 mg by mouth every 6 (six) hours.      predniSONE (DELTASONE) 50 MG Tab Take 40 mg by mouth once daily.       promethazine (PHENERGAN) 25 MG tablet Take 25 mg by mouth every 6 (six) hours as needed for Nausea.         History reviewed. No pertinent surgical history.    Social History     Socioeconomic History    Marital status: Single     Spouse name: Not on file    Number of children: Not on file    Years of education: Not on file    Highest education level: Not on file   Occupational History    Not on file   Social Needs    Financial resource strain: Not on file    Food insecurity:     Worry: Not on file     Inability: Not on file    Transportation needs:     Medical: Not on file     Non-medical: Not on file   Tobacco Use    Smoking status: Not on file   Substance and Sexual Activity    Alcohol use: Not on file    Drug use: Not on file    Sexual activity: Not on file   Lifestyle    Physical activity:     Days per week: Not on file     Minutes per session: Not on file    Stress: Not on file   Relationships    Social connections:     Talks on phone: Not on file     Gets together: Not on file     Attends Confucianism service: Not on file     Active member of club or organization: Not on file      Attends meetings of clubs or organizations: Not on file     Relationship status: Not on file   Other Topics Concern    Not on file   Social History Narrative    Not on file         CBC:   Recent Labs     20  0918   WBC 12.23   RBC 4.48   HGB 11.6*   HCT 39.0      MCV 87   MCH 25.9*   MCHC 29.7*       CMP:   Recent Labs     20  0918      K 4.0      CO2 27   BUN 11   CREATININE 0.8   GLU 79   CALCIUM 10.0   ALBUMIN 4.2   PROT 7.8   ALKPHOS 45*   ALT 10   AST 10   BILITOT 0.3       INR  No results for input(s): PT, INR, PROTIME, APTT in the last 72 hours.        Diagnostic Studies:      EKD Echo:  No results found for this or any previous visit.      Anesthesia Evaluation    I have reviewed the Patient Summary Reports.   I have reviewed the NPO Status.      Review of Systems  Anesthesia Hx:  History of prior surgery of interest to airway management or planning: Denies Family Hx of Anesthesia complications.   Denies Personal Hx of Anesthesia complications.       Physical Exam  General:  Well nourished    Airway/Jaw/Neck:  Airway Findings: Mouth Opening: Normal Tongue: Normal  General Airway Assessment: Adult  Mallampati: II  TM Distance: Normal, at least 6 cm      Dental:  Dental Findings: In tact   Chest/Lungs:  Chest/Lungs Findings: Clear to auscultation, Normal Respiratory Rate         Mental Status:  Mental Status Findings:  Cooperative, Alert and Oriented         Anesthesia Plan  Type of Anesthesia, risks & benefits discussed:  Anesthesia Type:  general  Patient's Preference:   Intra-op Monitoring Plan: standard ASA monitors  Intra-op Monitoring Plan Comments:   Post Op Pain Control Plan: multimodal analgesia  Post Op Pain Control Plan Comments:   Induction:   IV  Beta Blocker:  Patient is not currently on a Beta-Blocker (No further documentation required).       Informed Consent: Patient understands risks and agrees with Anesthesia plan.  Questions answered. Anesthesia  consent signed with patient.  ASA Score: 3     Day of Surgery Review of History & Physical:    H&P update referred to the surgeon.         Ready For Surgery From Anesthesia Perspective.

## 2020-06-11 NOTE — INTERVAL H&P NOTE
The patient has been examined and the H&P has been reviewed:    I concur with the findings and no changes have occurred since H&P was written.    Anesthesia/Surgery risks, benefits and alternative options discussed and understood by patient/family.          Active Hospital Problems    Diagnosis  POA    Ulcerative pancolitis [K51.00]  Yes      Resolved Hospital Problems   No resolved problems to display.

## 2020-06-12 LAB
ANION GAP SERPL CALC-SCNC: 8 MMOL/L (ref 8–16)
ANION GAP SERPL CALC-SCNC: 9 MMOL/L (ref 8–16)
BASOPHILS # BLD AUTO: 0.02 K/UL (ref 0–0.2)
BASOPHILS # BLD AUTO: 0.02 K/UL (ref 0–0.2)
BASOPHILS NFR BLD: 0.1 % (ref 0–1.9)
BASOPHILS NFR BLD: 0.1 % (ref 0–1.9)
BILIRUB UR QL STRIP: NEGATIVE
BUN SERPL-MCNC: 10 MG/DL (ref 6–20)
BUN SERPL-MCNC: 9 MG/DL (ref 6–20)
CALCIUM SERPL-MCNC: 8.7 MG/DL (ref 8.7–10.5)
CALCIUM SERPL-MCNC: 8.9 MG/DL (ref 8.7–10.5)
CHLORIDE SERPL-SCNC: 104 MMOL/L (ref 95–110)
CHLORIDE SERPL-SCNC: 104 MMOL/L (ref 95–110)
CLARITY UR REFRACT.AUTO: CLEAR
CO2 SERPL-SCNC: 24 MMOL/L (ref 23–29)
CO2 SERPL-SCNC: 24 MMOL/L (ref 23–29)
COLOR UR AUTO: COLORLESS
CREAT SERPL-MCNC: 0.7 MG/DL (ref 0.5–1.4)
CREAT SERPL-MCNC: 0.8 MG/DL (ref 0.5–1.4)
DIFFERENTIAL METHOD: ABNORMAL
DIFFERENTIAL METHOD: ABNORMAL
EOSINOPHIL # BLD AUTO: 0 K/UL (ref 0–0.5)
EOSINOPHIL # BLD AUTO: 0 K/UL (ref 0–0.5)
EOSINOPHIL NFR BLD: 0 % (ref 0–8)
EOSINOPHIL NFR BLD: 0 % (ref 0–8)
ERYTHROCYTE [DISTWIDTH] IN BLOOD BY AUTOMATED COUNT: 14.1 % (ref 11.5–14.5)
ERYTHROCYTE [DISTWIDTH] IN BLOOD BY AUTOMATED COUNT: 14.5 % (ref 11.5–14.5)
EST. GFR  (AFRICAN AMERICAN): >60 ML/MIN/1.73 M^2
EST. GFR  (AFRICAN AMERICAN): >60 ML/MIN/1.73 M^2
EST. GFR  (NON AFRICAN AMERICAN): >60 ML/MIN/1.73 M^2
EST. GFR  (NON AFRICAN AMERICAN): >60 ML/MIN/1.73 M^2
GLUCOSE SERPL-MCNC: 103 MG/DL (ref 70–110)
GLUCOSE SERPL-MCNC: 127 MG/DL (ref 70–110)
GLUCOSE UR QL STRIP: NEGATIVE
HCT VFR BLD AUTO: 34 % (ref 37–48.5)
HCT VFR BLD AUTO: 34.4 % (ref 37–48.5)
HGB BLD-MCNC: 10 G/DL (ref 12–16)
HGB BLD-MCNC: 10.4 G/DL (ref 12–16)
HGB UR QL STRIP: NEGATIVE
IMM GRANULOCYTES # BLD AUTO: 0.12 K/UL (ref 0–0.04)
IMM GRANULOCYTES # BLD AUTO: 0.18 K/UL (ref 0–0.04)
IMM GRANULOCYTES NFR BLD AUTO: 0.6 % (ref 0–0.5)
IMM GRANULOCYTES NFR BLD AUTO: 1 % (ref 0–0.5)
KETONES UR QL STRIP: NEGATIVE
LEUKOCYTE ESTERASE UR QL STRIP: NEGATIVE
LYMPHOCYTES # BLD AUTO: 0.9 K/UL (ref 1–4.8)
LYMPHOCYTES # BLD AUTO: 1.3 K/UL (ref 1–4.8)
LYMPHOCYTES NFR BLD: 5 % (ref 18–48)
LYMPHOCYTES NFR BLD: 6.5 % (ref 18–48)
MAGNESIUM SERPL-MCNC: 2 MG/DL (ref 1.6–2.6)
MCH RBC QN AUTO: 25.4 PG (ref 27–31)
MCH RBC QN AUTO: 25.6 PG (ref 27–31)
MCHC RBC AUTO-ENTMCNC: 29.4 G/DL (ref 32–36)
MCHC RBC AUTO-ENTMCNC: 30.2 G/DL (ref 32–36)
MCV RBC AUTO: 85 FL (ref 82–98)
MCV RBC AUTO: 87 FL (ref 82–98)
MONOCYTES # BLD AUTO: 0.7 K/UL (ref 0.3–1)
MONOCYTES # BLD AUTO: 1.1 K/UL (ref 0.3–1)
MONOCYTES NFR BLD: 3.9 % (ref 4–15)
MONOCYTES NFR BLD: 5.4 % (ref 4–15)
NEUTROPHILS # BLD AUTO: 16.7 K/UL (ref 1.8–7.7)
NEUTROPHILS # BLD AUTO: 17.8 K/UL (ref 1.8–7.7)
NEUTROPHILS NFR BLD: 87.4 % (ref 38–73)
NEUTROPHILS NFR BLD: 90 % (ref 38–73)
NITRITE UR QL STRIP: NEGATIVE
NRBC BLD-RTO: 0 /100 WBC
NRBC BLD-RTO: 0 /100 WBC
PH UR STRIP: 7 [PH] (ref 5–8)
PHOSPHATE SERPL-MCNC: 3.6 MG/DL (ref 2.7–4.5)
PLATELET # BLD AUTO: 305 K/UL (ref 150–350)
PLATELET # BLD AUTO: 342 K/UL (ref 150–350)
PMV BLD AUTO: 10.8 FL (ref 9.2–12.9)
PMV BLD AUTO: 11.5 FL (ref 9.2–12.9)
POTASSIUM SERPL-SCNC: 3.9 MMOL/L (ref 3.5–5.1)
POTASSIUM SERPL-SCNC: 4.4 MMOL/L (ref 3.5–5.1)
PROT UR QL STRIP: NEGATIVE
RBC # BLD AUTO: 3.93 M/UL (ref 4–5.4)
RBC # BLD AUTO: 4.07 M/UL (ref 4–5.4)
SODIUM SERPL-SCNC: 136 MMOL/L (ref 136–145)
SODIUM SERPL-SCNC: 137 MMOL/L (ref 136–145)
SP GR UR STRIP: 1 (ref 1–1.03)
URN SPEC COLLECT METH UR: ABNORMAL
WBC # BLD AUTO: 18.5 K/UL (ref 3.9–12.7)
WBC # BLD AUTO: 20.3 K/UL (ref 3.9–12.7)

## 2020-06-12 PROCEDURE — 63600175 PHARM REV CODE 636 W HCPCS: Performed by: STUDENT IN AN ORGANIZED HEALTH CARE EDUCATION/TRAINING PROGRAM

## 2020-06-12 PROCEDURE — 20600001 HC STEP DOWN PRIVATE ROOM

## 2020-06-12 PROCEDURE — 94799 UNLISTED PULMONARY SVC/PX: CPT

## 2020-06-12 PROCEDURE — 85025 COMPLETE CBC W/AUTO DIFF WBC: CPT | Mod: 91

## 2020-06-12 PROCEDURE — 81003 URINALYSIS AUTO W/O SCOPE: CPT

## 2020-06-12 PROCEDURE — 80048 BASIC METABOLIC PNL TOTAL CA: CPT | Mod: 91

## 2020-06-12 PROCEDURE — 80048 BASIC METABOLIC PNL TOTAL CA: CPT

## 2020-06-12 PROCEDURE — 36415 COLL VENOUS BLD VENIPUNCTURE: CPT

## 2020-06-12 PROCEDURE — 25000003 PHARM REV CODE 250: Performed by: STUDENT IN AN ORGANIZED HEALTH CARE EDUCATION/TRAINING PROGRAM

## 2020-06-12 PROCEDURE — 83735 ASSAY OF MAGNESIUM: CPT

## 2020-06-12 PROCEDURE — 94761 N-INVAS EAR/PLS OXIMETRY MLT: CPT

## 2020-06-12 PROCEDURE — 84100 ASSAY OF PHOSPHORUS: CPT

## 2020-06-12 PROCEDURE — 25000003 PHARM REV CODE 250: Performed by: NURSE PRACTITIONER

## 2020-06-12 PROCEDURE — 99232 SBSQ HOSP IP/OBS MODERATE 35: CPT | Mod: ,,, | Performed by: INTERNAL MEDICINE

## 2020-06-12 PROCEDURE — 97161 PT EVAL LOW COMPLEX 20 MIN: CPT

## 2020-06-12 PROCEDURE — 97165 OT EVAL LOW COMPLEX 30 MIN: CPT

## 2020-06-12 PROCEDURE — 99232 PR SUBSEQUENT HOSPITAL CARE,LEVL II: ICD-10-PCS | Mod: ,,, | Performed by: INTERNAL MEDICINE

## 2020-06-12 PROCEDURE — 99900035 HC TECH TIME PER 15 MIN (STAT)

## 2020-06-12 RX ORDER — LIDOCAINE 50 MG/G
1 PATCH TOPICAL
Status: DISCONTINUED | OUTPATIENT
Start: 2020-06-12 | End: 2020-06-15 | Stop reason: HOSPADM

## 2020-06-12 RX ORDER — ENOXAPARIN SODIUM 100 MG/ML
40 INJECTION SUBCUTANEOUS EVERY 24 HOURS
Status: DISCONTINUED | OUTPATIENT
Start: 2020-06-12 | End: 2020-06-15 | Stop reason: HOSPADM

## 2020-06-12 RX ORDER — PREDNISONE 20 MG/1
40 TABLET ORAL DAILY
Status: DISCONTINUED | OUTPATIENT
Start: 2020-06-12 | End: 2020-06-13

## 2020-06-12 RX ADMIN — OXYCODONE HYDROCHLORIDE 10 MG: 10 TABLET ORAL at 03:06

## 2020-06-12 RX ADMIN — ONDANSETRON HYDROCHLORIDE 4 MG: 2 SOLUTION INTRAMUSCULAR; INTRAVENOUS at 03:06

## 2020-06-12 RX ADMIN — GABAPENTIN 300 MG: 300 CAPSULE ORAL at 08:06

## 2020-06-12 RX ADMIN — MUPIROCIN: 20 OINTMENT TOPICAL at 09:06

## 2020-06-12 RX ADMIN — ACETAMINOPHEN 1000 MG: 10 INJECTION, SOLUTION INTRAVENOUS at 05:06

## 2020-06-12 RX ADMIN — OXYCODONE HYDROCHLORIDE 10 MG: 10 TABLET ORAL at 02:06

## 2020-06-12 RX ADMIN — PREDNISONE 40 MG: 20 TABLET ORAL at 08:06

## 2020-06-12 RX ADMIN — MUPIROCIN: 20 OINTMENT TOPICAL at 08:06

## 2020-06-12 RX ADMIN — LIDOCAINE 1 PATCH: 50 PATCH TOPICAL at 05:06

## 2020-06-12 RX ADMIN — ENOXAPARIN SODIUM 40 MG: 100 INJECTION SUBCUTANEOUS at 05:06

## 2020-06-12 RX ADMIN — TRAMADOL HYDROCHLORIDE 50 MG: 50 TABLET, FILM COATED ORAL at 05:06

## 2020-06-12 RX ADMIN — IBUPROFEN 800 MG: 800 INJECTION INTRAVENOUS at 07:06

## 2020-06-12 RX ADMIN — ACETAMINOPHEN 1000 MG: 10 INJECTION, SOLUTION INTRAVENOUS at 02:06

## 2020-06-12 RX ADMIN — GABAPENTIN 300 MG: 300 CAPSULE ORAL at 04:06

## 2020-06-12 RX ADMIN — IBUPROFEN 800 MG: 800 INJECTION INTRAVENOUS at 06:06

## 2020-06-12 RX ADMIN — OXYCODONE HYDROCHLORIDE 10 MG: 10 TABLET ORAL at 11:06

## 2020-06-12 RX ADMIN — GABAPENTIN 300 MG: 300 CAPSULE ORAL at 09:06

## 2020-06-12 RX ADMIN — ACETAMINOPHEN 1000 MG: 500 TABLET ORAL at 09:06

## 2020-06-12 RX ADMIN — IBUPROFEN 800 MG: 400 TABLET, FILM COATED ORAL at 09:06

## 2020-06-12 RX ADMIN — OXYCODONE HYDROCHLORIDE 10 MG: 10 TABLET ORAL at 08:06

## 2020-06-12 NOTE — SUBJECTIVE & OBJECTIVE
Subjective:     Interval History: No acute events overnight. Some incisional pain this morning. Garcia just removed. Tolerating clears with minimal nausea.     Post-Op Info:  Procedure(s) (LRB):  COLECTOMY, TOTAL, LAPAROSCOPIC, ERAS high and total abdominal colectomy (N/A)   1 Day Post-Op      Medications:  Continuous Infusions:   sodium chloride 0.9% 40 mL/hr at 06/11/20 2040    sodium chloride 0.9%       Scheduled Meds:   acetaminophen  1,000 mg Intravenous Q8H    Followed by    acetaminophen  1,000 mg Oral Q8H    gabapentin  300 mg Oral TID    gabapentin  300 mg Oral TID    ibuprofen  800 mg Intravenous Q8H    Followed by    ibuprofen  800 mg Oral Q8H    mupirocin   Nasal BID     PRN Meds:   ondansetron    oxyCODONE    oxyCODONE    promethazine (PHENERGAN) IVPB    sodium chloride 0.9%    traMADoL        Objective:     Vital Signs (Most Recent):  Temp: 97.6 °F (36.4 °C) (06/12/20 0658)  Pulse: 70 (06/12/20 0658)  Resp: 18 (06/12/20 0658)  BP: (!) 99/58 (06/12/20 0658)  SpO2: 99 % (06/12/20 0658) Vital Signs (24h Range):  Temp:  [97.6 °F (36.4 °C)-98.8 °F (37.1 °C)] 97.6 °F (36.4 °C)  Pulse:  [66-84] 70  Resp:  [11-24] 18  SpO2:  [97 %-100 %] 99 %  BP: ()/(58-84) 99/58     Intake/Output - Last 3 Shifts       06/10 0700 - 06/11 0659 06/11 0700 - 06/12 0659 06/12 0700 - 06/13 0659    I.V. (mL/kg)  2100 (37.8)     Total Intake(mL/kg)  2100 (37.8)     Urine (mL/kg/hr)  1385     Stool  75     Total Output  1460     Net  +640            Stool Occurrence  0 x           Physical Exam   Constitutional: She is oriented to person, place, and time. She appears well-developed and well-nourished. No distress.   Cardiovascular: Normal rate and regular rhythm.   Pulmonary/Chest: Effort normal. No respiratory distress.   Abdominal: Soft. She exhibits no distension. There is tenderness (appropriate post op tenderness). There is no rebound and no guarding.   Incisions are clean, dry and intact   Neurological:  She is alert and oriented to person, place, and time.   Skin: Skin is warm and dry.       Significant Labs:  BMP:   Recent Labs   Lab 06/12/20  0403         K 4.4      CO2 24   BUN 10   CREATININE 0.7   CALCIUM 8.9   MG 2.0     CBC:   Recent Labs   Lab 06/12/20  0403   WBC 20.30*   RBC 3.93*   HGB 10.0*   HCT 34.0*      MCV 87   MCH 25.4*   MCHC 29.4*       Significant Diagnostics:

## 2020-06-12 NOTE — PLAN OF CARE
Patient is a 24 y/o. Female admitted to ER on 6-11-20 for Ulcerative colitis. SW completed patient admit assessment via phone with patient. She states that she admitted to [facility] from home and currently resides in a one story home with her daughter and fiance with 5 steps to enter. According to patient, she does not use any DME equipment and is not on dialysis. She report that her fiance with transport her home when she becomes medically stable for discharge. ALTHEA provided pt with SW name and contact information if further assistance is needed.     PCP  Kim Owens MD  [address] [place], [place] [id number] [phone]     Emergency   Sven Solomon- patient fiance  [phone]     PHARMACY  [facility]  [facility] [place] [address]     06/12/20 1335   Discharge Assessment   Assessment Type Discharge Planning Assessment   Confirmed/corrected address and phone number on facesheet? Yes   Assessment information obtained from? Patient   Communicated expected length of stay with patient/caregiver yes   Prior to hospitilization cognitive status: Alert/Oriented   Prior to hospitalization functional status: Independent   Current cognitive status: Alert/Oriented   Current Functional Status: Independent   Lives With child(darius), dependent;significant other   Able to Return to Prior Arrangements no   Patient's perception of discharge disposition home or selfcare   Readmission Within the Last 30 Days no previous admission in last 30 days   Patient currently being followed by outpatient case management? No   Patient currently receives any other outside agency services? No   Equipment Currently Used at Home none   Do you have any problems affording any of your prescribed medications? No   Is the patient taking medications as prescribed? yes   Does the patient have transportation home? Yes   Transportation Anticipated family or friend will provide   Does the patient  receive services at the [facility]mad[facility] Clinic? No   Discharge Plan A Home with family   Discharge Plan B Home with family   DME Needed Upon Discharge  none   Patient/Family in Agreement with Plan yes

## 2020-06-12 NOTE — PLAN OF CARE
06/12/20 1337   Post-Acute Status   Post-Acute Authorization Other   Other Status No Post-Acute Service Needs     SW following patient for discharge planning needs. No needs identified at this time. SW will continue to follow up.      Bettina Hodges LMSW  [facility]   [id number]

## 2020-06-12 NOTE — SUBJECTIVE & OBJECTIVE
Interval HPI:   Overnight events:  VS stable    Nausea: No  Hypoglycemia and intervention: No  Fever: No  TPN and/or TF: No    PMH, PSH, FH, SH updated and reviewed     ROS:  Review of Systems   Constitutional: Negative for unexpected weight change.   Eyes: Negative for visual disturbance.   Respiratory: Negative for shortness of breath.    Cardiovascular: Negative for chest pain.   Gastrointestinal: Positive for abdominal pain.   Genitourinary: Negative for urgency.   Musculoskeletal: Negative for arthralgias.   Skin: Negative for wound.   Neurological: Negative for headaches.   Hematological: Does not bruise/bleed easily.   Psychiatric/Behavioral: Negative for sleep disturbance.       PHYSICAL EXAMINATION:  Vitals:    06/12/20 1153   BP: 118/63   Pulse: 80   Resp: 18   Temp: 98.5 °F (36.9 °C)     Body mass index is 21.71 kg/m².    Physical Exam   Constitutional: She is oriented to person, place, and time. She appears well-developed and well-nourished.   HENT:   Head: Normocephalic and atraumatic.   Neck: Normal range of motion. Neck supple.   Cardiovascular: Normal rate and regular rhythm.   Pulmonary/Chest: Effort normal. No respiratory distress.   Abdominal: Soft.   Has colostomy bag in place   Neurological: She is alert and oriented to person, place, and time.   Skin: Skin is warm. No rash noted.   No striae noted on abdomen   Psychiatric: She has a normal mood and affect. Her behavior is normal.

## 2020-06-12 NOTE — PLAN OF CARE
Patient is a 26 y/o. Female admitted to ER on 6-11-20 for Ulcerative colitis. SW completed patient admit assessment via phone with patient. She states that she admitted to [facility] from home and currently resides in a one story home with her daughter and fiance with 5 steps to enter. According to patient, she does not use any DME equipment and is not on dialysis. She report that her fiance with transport her home when she becomes medically stable for discharge. SW provided pt with SW name and contact information if further assistance is needed.    PCP  Kim Owens MD  [address] [place] [address] [phone]    Emergency   Sven Solomon- patient fiance  [phone]    PHARMACY  [facility]  [address] [place] [id number]    Bettina Hodges LMSW  [facility]   [id number]

## 2020-06-12 NOTE — PT/OT/SLP EVAL
Occupational Therapy   Evaluation and Discharge Note    Name: Alissa Mitchell  MRN: [id number]  Admitting Diagnosis:  Ulcerative pancolitis 1 Day Post-Op    Recommendations:     Discharge Recommendations: home  Discharge Equipment Recommendations:  none  Barriers to discharge:  None    Assessment:     Alissa Mitchell is a 25 y.o. female with a medical diagnosis of Ulcerative pancolitis. Pt tolerated session well with no c/o of pain. Pt donned underwear in standing with independence and ambulated > household distance with independence. At this time, patient is functioning at their prior level of function and does not require further acute OT services.     Plan:     During this hospitalization, patient does not require further acute OT services.  Please re-consult if situation changes.    · Plan of Care Reviewed with: patient, significant other    Subjective     Chief Complaint: sleepy from medication  Patient/Family Comments/goals: to go hiking with her family    Occupational Profile:  Living Environment: Pt lives with her fiance and 7 y.o daughter in a [facility] with 5 ELIAS  Previous level of function: PTA, pt was independent with self-care and functional mobility  Roles and Routines: Pt works full time and enjoys hiking  Equipment Used at home:  none  Assistance upon Discharge: Upon discharge, pt will have assistance from family    Pain/Comfort:  · Pain Rating 1: 0/10    Patients cultural, spiritual, Adventism conflicts given the current situation: no    Objective:     Communicated with: RN and PT prior to session.  Patient found HOB elevated with SCD upon OT entry to room. Co-evaluation with PT    General Precautions: Standard, fall   Orthopedic Precautions:N/A   Braces: N/A     Occupational Performance:    Bed Mobility:    · Patient completed Supine to Sit with independence    Functional Mobility/Transfers:  · Patient completed Sit <> Stand Transfer with independence  with  no assistive device   · Functional Mobility: Pt  ambulated > household distance with independence and no AD. No LOB, SOB, or c/o of dizziness    Activities of Daily Living:  · Lower Body Dressing: independence to don underwear in standing    Cognitive/Visual Perceptual:  Cognitive/Psychosocial Skills:     -       Oriented to: Person, Place, Time and Situation   -       Follows Commands/attention:Follows multistep  commands  -       Communication: clear/fluent  -       Memory: No Deficits noted  -       Safety awareness/insight to disability: intact   -       Mood/Affect/Coping skills/emotional control: Appropriate to situation    Physical Exam:  Upper Extremity Range of Motion:     -       Right Upper Extremity: WFL  -       Left Upper Extremity: WFL  Upper Extremity Strength:    -       Right Upper Extremity: WFL  -       Left Upper Extremity: WFL   Strength:    -       Right Upper Extremity: WFL  -       Left Upper Extremity: WFL  Fine Motor Coordination:    -       Intact    AMPAC 6 Click ADL:  AMPAC Total Score: 24    Treatment & Education:  -Therapist provided facilitation and instruction of proper body mechanics, energy conservation, and fall prevention strategies during tasks listed above.  -Pt educated on role of OT, POC and discharge from acute OT  -Pt educated on importance of OOB activities with staff member assistance and sitting OOB majority of the day.   -Pt verbalized understanding. Pt expressed no further concerns/questions    Education:    Patient left standing in room with all lines intact, call button in reach and fiance  present    GOALS:   Multidisciplinary Problems     Occupational Therapy Goals     Not on file          Multidisciplinary Problems (Resolved)        Problem: Occupational Therapy Goal    Goal Priority Disciplines Outcome Interventions   Occupational Therapy Goal   (Resolved)     OT, PT/OT Met    Description:  Pt is currently performing ADLs, functional mobility and transfers at baseline. OT services are not recommended at  this time and pt is safe to discharge home.                      History:     Past Medical History:   Diagnosis Date    ADD (attention deficit disorder)     Anxiety     Chronic anemia     Murmur     Ulcerative colitis        Past Surgical History:   Procedure Laterality Date    LAPAROSCOPIC TOTAL COLECTOMY N/A 6/11/2020    Procedure: COLECTOMY, TOTAL, LAPAROSCOPIC, ERAS high and total abdominal colectomy;  Surgeon: Delia Mehta MD;  Location: [facility] OR [address] [place] [address];  Service: Colon and Rectal;  Laterality: N/A;       Time Tracking:     OT Date of Treatment: 06/12/20  OT Start Time: 1120  OT Stop Time: 1128  OT Total Time (min): 8 min    Billable Minutes:Evaluation 8    Roselyn Hewitt, OT  6/12/2020

## 2020-06-12 NOTE — CONSULTS
[facility]  Endocrinology  Consult Note    Consult Requested by: Delia Mehta MD   Reason for admit: Ulcerative pancolitis    HISTORY OF PRESENT ILLNESS:  A 24 yo woman with diagnosis of ulcerative colitis (dx at age 18), was admitted for colectomy. Underwent laparoscopic total abdominal colectomy on 6/11/20. Endocrinology was consulted for steroid taper.     Pt reports intermittent use of steroids over the past 7 years. Usually takes Prednisone 20 mg, daily, during UC flare. Has been taking Prednisone 40 mg since one month.     Medications and/or Treatments Impacting Glycemic Control:  Immunotherapy:    Immunosuppressants     None        Steroids:   Hormones (From admission, onward)    Start     Stop Route Frequency Ordered    06/12/20 0900  predniSONE tablet 40 mg      -- Oral Daily 06/12/20 0750        Pressors:    Autonomic Drugs (From admission, onward)    None          Medications Prior to Admission   Medication Sig Dispense Refill Last Dose    balsalazide (COLAZAL) 750 mg capsule Take 2,250 mg by mouth 3 (three) times daily.   6/11/2020 at 0600    dicyclomine (BENTYL) 20 mg tablet Take 20 mg by mouth every 6 (six) hours.   6/10/2020 at Unknown time    metroNIDAZOLE (FLAGYL) 500 MG tablet Take 1 tablet at 1pm, 2pm, and 11pm the day before surgery. 3 tablet 0 6/10/2020 at Unknown time    neomycin (MYCIFRADIN) 500 mg Tab Take 2 tablets at 1pm, 2pm, and 11pm the day before surgery. 6 tablet 0 6/10/2020 at Unknown time    polyethylene glycol (GLYCOLAX) 17 gram/dose powder 3pm - Day before procedure: Drink 8-8 oz glasses of clear liquid with one cap of Miralax in each glass.  (One every 15-20 minutes.) If bowels are not clear - repeat 2-3 more caps until clear. 238 g 0 6/10/2020 at Unknown time    predniSONE (DELTASONE) 50 MG Tab Take 40 mg by mouth once daily.    6/11/2020 at 0600    promethazine (PHENERGAN) 25 MG tablet Take 25 mg by mouth every 6 (six) hours as needed for  Nausea.   Past Week at Unknown time       Current Facility-Administered Medications   Medication Dose Route Frequency Provider Last Rate Last Dose    0.9%  NaCl infusion   Intravenous Continuous Mariajose Cruz NP        acetaminophen 1,000 mg/100 mL (10 mg/mL) injection 1,000 mg  1,000 mg Intravenous Q8H Marcela Proctor  mL/hr at 06/12/20 0559 1,000 mg at 06/12/20 0559    Followed by    acetaminophen tablet 1,000 mg  1,000 mg Oral Q8H Marcela Proctor MD        enoxaparin injection 40 mg  40 mg Subcutaneous Daily Marcela Proctor MD        gabapentin capsule 300 mg  300 mg Oral TID Marcela Proctor MD   300 mg at 06/12/20 0822    gabapentin capsule 300 mg  300 mg Oral TID Mariajose Cruz NP        ibuprofen (CALDOLOR) 800mg/250mL D5W (READY TO MIX SYSTEM)  800 mg Intravenous Q8H Marcela Proctor MD   800 mg at 06/12/20 0630    Followed by    ibuprofen tablet 800 mg  800 mg Oral Q8H Marcela Proctor MD        mupirocin 2 % ointment   Nasal BID Marcela Proctor MD        ondansetron injection 4 mg  4 mg Intravenous Q6H PRN Marcela Proctor MD   4 mg at 06/12/20 0348    oxyCODONE immediate release tablet 10 mg  10 mg Oral Q4H PRN Marcela Proctor MD   10 mg at 06/12/20 0820    oxyCODONE immediate release tablet 5 mg  5 mg Oral Q6H PRN Marcela Proctor MD        predniSONE tablet 40 mg  40 mg Oral Daily Marcela Proctor MD   40 mg at 06/12/20 0820    promethazine (PHENERGAN) 6.25 mg in dextrose 5 % 50 mL IVPB  6.25 mg Intravenous Q6H PRN Marcela Proctor MD        sodium chloride 0.9% flush 10 mL  10 mL Intra-Catheter PRN Marcela Proctor MD        traMADoL tablet 50 mg  50 mg Oral Q6H PRN Marcela Proctor MD           Interval HPI:   Overnight events:  VS stable    Nausea: No  Hypoglycemia and intervention: No  Fever: No  TPN and/or TF: No    PMH, PSH, FH, SH updated and reviewed     ROS:  Review of Systems   Constitutional: Negative for unexpected  weight change.   Eyes: Negative for visual disturbance.   Respiratory: Negative for shortness of breath.    Cardiovascular: Negative for chest pain.   Gastrointestinal: Positive for abdominal pain.   Genitourinary: Negative for urgency.   Musculoskeletal: Negative for arthralgias.   Skin: Negative for wound.   Neurological: Negative for headaches.   Hematological: Does not bruise/bleed easily.   Psychiatric/Behavioral: Negative for sleep disturbance.       PHYSICAL EXAMINATION:  Vitals:    06/12/20 1153   BP: 118/63   Pulse: 80   Resp: 18   Temp: 98.5 °F (36.9 °C)     Body mass index is 21.71 kg/m².    Physical Exam   Constitutional: She is oriented to person, place, and time. She appears well-developed and well-nourished.   HENT:   Head: Normocephalic and atraumatic.   Neck: Normal range of motion. Neck supple.   Cardiovascular: Normal rate and regular rhythm.   Pulmonary/Chest: Effort normal. No respiratory distress.   Abdominal: Soft.   Has colostomy bag in place   Neurological: She is alert and oriented to person, place, and time.   Skin: Skin is warm. No rash noted.   No striae noted on abdomen   Psychiatric: She has a normal mood and affect. Her behavior is normal.     .     ASSESSMENT and PLAN:    * Ulcerative pancolitis  S/p colectomy on 6/11/20    Currently on Prednisone 40 mg, daily. As per colo-rectal surgery, pt can be tapered off of steroids completely.     Pt doesn't have any signs/ symptoms of Cushing's syndrome. No evidence of hypertension or hyperglycemia.  But given chronic use of steroids, she is at risk of developing secondary AI. Would recommend steroid taper.     Recommendations:   -Would recommend decreasing dose of Prednisone to 20 mg, daily, staring tomorrow.  -Would taper Prednisone by 5 mg every week:    -Prednisone 15 mg, daily: 6/20   -Prednisone 10 mg, daily: 6/27   -Prednisone 5 mg, daily: 7/4   -Discontinue Prednisone on 7/11    -No need for further follow up in endocrine clinic  unless symptomatic (dizziness, N/V, fatigue, hypotension).        Endocrinology will sign off. Please re-consult if there are any further questions/ concerns.       Mary Crook MD  Endocrinology  [facility]

## 2020-06-12 NOTE — PLAN OF CARE
Problem: Occupational Therapy Goal  Goal: Occupational Therapy Goal  Description  Pt is currently performing ADLs, functional mobility and transfers at baseline. OT services are not recommended at this time and pt is safe to discharge home.     Outcome: Met       Evaluation and D/C complete-see note for details.    RU Mcmillan  6/12/2020   Pager #: [phone]

## 2020-06-12 NOTE — TRANSFER OF CARE
"Anesthesia Transfer of Care Note    Patient: Alissa Mitchell    Procedure(s) Performed: Procedure(s) (LRB):  COLECTOMY, TOTAL, LAPAROSCOPIC, ERAS high and total abdominal colectomy (N/A)    Patient location: PACU    Anesthesia Type: general    Transport from OR: Transported from OR on 6-10 L/min O2 by face mask with adequate spontaneous ventilation    Post pain: adequate analgesia    Post assessment: no apparent anesthetic complications and tolerated procedure well    Post vital signs: stable    Level of consciousness: awake    Nausea/Vomiting: no nausea/vomiting    Complications: none    Transfer of care protocol was followed      Last vitals:   Visit Vitals  /84 (BP Location: Left arm, Patient Position: Lying)   Pulse 79   Temp 36.9 °C (98.5 °F) (Oral)   Resp 17   Ht 5' 3" (1.6 m)   Wt 55.6 kg (122 lb 9.2 oz)   LMP 06/05/2020   SpO2 100%   Breastfeeding? No   BMI 21.71 kg/m²     "

## 2020-06-12 NOTE — NURSING TRANSFER
Nursing Transfer Note      6/11/2020     Transfer To: 1028    Transfer via bed    Transfer with n/a    Transported by pct x 2     Medicines sent: mupirocin    Chart send with patient: Yes    Notified:  via telephone    Patient reassessed at: 6/11/2020 2100

## 2020-06-12 NOTE — PROGRESS NOTES
POC reviewed with pt, AAOx4. No s/s of respiratory or cardiac distress. VS stable on RA. Adequate UOP per barron; NS infusing at 40 mL/hr. 3 lap sites and 1 low transverse incision SIN DB CDI; Tolerating clear diet well. Pain/nausea managed with PRN.     Barron d/c per order; dur to void by noon today.    Pt free from falls and injuries, bed in low position, side rails up x2, call light within reach.    Will continue to monitor.

## 2020-06-12 NOTE — OP NOTE
[facility]  Operative Note    Date: 6/11/2020    Name: Alissa Mitchell    MRN: [id number]    Pre-Op Diagnosis: Ulcerative pancolitis with rectal bleeding [K51.011]    Post-Op Diagnosis: Same    Procedure(s) Performed:   1.  Laparoscopic total abdominal colectomy  2.  Bilateral T AP blocks    Specimen(s):  Total abdominal colectomy    Staff Surgeon: Delia Mehta    Assistant Surgeon: Marcela Proctor (resident)    Anesthesia: General    Indications: Alissa Mitchell is a 25 y.o. female with a history of ulcerative colitis.  This was diagnosed 7 years ago, and has primarily been left-sided.  She has failed multiple attempts of medical management including Remicade, Imuran, and Tofacitinib.  Recently has become steroid dependent with 30 lb of weight loss since March.  After discussion with her gastroenterologist she requested referral to surgery, as she thinks that colectomy would give her better quality of life.  After discussion of risks and benefits she agreed to proceed with surgery.  Please see my dictated clinic note for further details.    Details of procedure:  The patient was taken to the operating room and transferred onto the OR table.  SCD boots were applied and IV antibiotics were administered.  She was placed under general endotracheal anesthesia, and a Garcia catheter was placed.  She was then put into lithotomy position.  Her abdomen was prepped and draped in the standard sterile fashion.  After an appropriate time-out an 8 cm Pfannenstiel incision was created.  This was carried down through the subcutaneous fat and the anterior fascia was opened transversely.  The rectus muscle was divided in the preperitoneal fat and posterior fascia were opened vertically.  We entered the peritoneal cavity without any injury.  The wound protector of the GelPort was then placed.  We placed an additional three 5 mm ports in the supraumbilical, left lower quadrant, and right lower quadrant locations.  The  abdomen was then inflated.  We performed a diagnostic laparoscopy.  The peritoneum, liver, stomach, small intestine, omentum, uterus, and ovaries all appeared normal.  We then performed bilateral T AP blocks using Exparel.      The patient was then put into Trendelenburg position with the left tilt.  The cecum was elevated away from the retroperitoneum identifying the ileocolic pedicle and duodenum.  The peritoneum underneath the ileocolic pedicle was opened and a medial to lateral dissection was performed up to the transverse colon and out to the lateral sidewall, taking care to sweep down the duodenum.  Once this dissection was complete we ligated the ileocolic pedicle with the ligasure.  We then elevated the cecum and distal ileum away from the pelvic brim, taking care to identify and preserve the right ureter.  We opened the peritoneum under the small bowel mesentery, meeting up with our previous medial to lateral plane.  We then took down the lateral attachments of the ascending colon.  Once we had reached the hepatic flexure we repositioned the patient into reverse Trendelenburg.  The hepato colic ligament was taken with the ligasure, and this plane was followed medially to the duodenum.      At this point we had the entire right colon mobilized and we turned our attention to the left side.  The patient was repositioned into Trendelenburg with a right tilt.  The lateral attachment the descending colon were taken sharply along the white line of Toldt, again taking care to identify and preserve the left ureter.  This plane was followed up to the splenic flexure.  We then turned our attention to the transverse colon.  We opened the lesser omentum between the stomach and transverse colon, taking care to preserve the gastroepiploic vessels.  We entered the lesser sac and then began to divide the omentum out towards the splenic flexure.  Once we had the omentum completely dissected off of the colon we turned our  attention to the descending colon mesentery.  We found a bare area in the distal descending colon and began to divide the descending colon mesentery from this area up towards the splenic flexure.  We took down any remaining attachments of the splenocolic and frontal colic ligaments.  We followed this plane medially towards the middle colic vessels, taking care to preserve the small bowel and its mesentery.    We then cleared the remaining omentum from the proximal transverse colon.  Once this was done all that remained was the residual transverse colon mesentery middle colic vessels, which were divided with the ligasure.  At this point the entire colon was mobilized.  We placed it back in its normal anatomic position and turned our attention to the distal ileum.  We divided the remaining mesentery up to the ileocecal valve.  We then removed the cap from the GelPort and extracted the specimen beginning in this location.  We cleared the anti mesenteric fat pad from the terminal ileum and divided it at the ileocecal valve using a blue load of the OMERO stapler.  The colon was then delivered.  We identified an area on the distal sigmoid colon for point of distal transection.  We created a small mesenteric window and then divided this bowel with a reload of the blue OMERO stapler.  The staple line was oversewn with Lembert Vicryl sutures, and the corner was tagged with a Prolene suture.  We then divided the remaining mesentery of the descending colon with the ligasure.  The specimen was then handed off for pathology.    We then turned our attention to the small bowel.  This was run in its entirety and examined for any evidence of inflammation, of which there was none.  We then replaced the small bowel into the abdomen and confirmed the proper orientation of the distal ileum and its mesentery.  Once this was done we created our stoma opening at her previously marked site in the right lower quadrant.  A small [place] of skin  was excised and the subcutaneous fat was dissected down to the anterior fascia which was incised vertically.  The muscle fibers were spread and the posterior fascia was opened wide enough to allow the passage of 2 fingers.  We then wrapped the distal terminal ileum in Seprafilm, and delivered the ileum into the stoma opening.  We again confirmed that there was no twisting of the bowel or mesentery.    We then removed all of the ports and the wound protector.  We changed our gowns and gloves and brought clean closing instruments onto the field.  The peritoneum of the Pfannenstiel was closed with a running Vicryl suture.  The fascia was closed with a running PDS suture.  The wound was irrigated copiously with warm saline and hemostasis was confirmed.  All skin incisions were closed with running Monocryl followed by skin glue.    Once the skin glue had dried the ileostomy was matured in a [place] fashion using Vicryl suture.  A stoma appliance was applied and the procedure was then terminated.  All sponge and instrument counts were correct.  I was present and scrubbed for the entire procedure.  The patient was awakened and transferred to the recovery room in good condition.    Estimated Blood Loss: 100mL    Drains/Implants:   Implant Name Type Inv. Item Serial No.  Lot No. LRB No. Used   MEMBRANE SEPRAFILM 5 X 6 - [id number]  MEMBRANE SEPRAFILM 5 X 6  [employer] [id number] N/A 1       Wound Class: II    Delia Mehta

## 2020-06-12 NOTE — PLAN OF CARE
Patient tolerated PT session well. She ambulated ~400ft with no AD and independence. She has no PT needs at discharge. She was educated to ambulate in hallway 3x's/day and she verbalized understanding. PT to sign off.

## 2020-06-12 NOTE — ANESTHESIA POSTPROCEDURE EVALUATION
Anesthesia Post Evaluation    Patient: Alissa Mitchell    Procedure(s) Performed: Procedure(s) (LRB):  COLECTOMY, TOTAL, LAPAROSCOPIC, ERAS high and total abdominal colectomy (N/A)    Final Anesthesia Type: general    Patient location during evaluation: PACU  Patient participation: Yes- Able to Participate  Level of consciousness: awake and alert and oriented  Post-procedure vital signs: reviewed and stable  Pain management: adequate  Airway patency: patent    PONV status at discharge: No PONV  Anesthetic complications: no      Cardiovascular status: hemodynamically stable  Respiratory status: unassisted, spontaneous ventilation and room air  Hydration status: euvolemic  Follow-up not needed.          Vitals Value Taken Time   BP 99/58 6/12/2020  6:58 AM   Temp 36.4 °C (97.6 °F) 6/12/2020  6:58 AM   Pulse 70 6/12/2020  6:58 AM   Resp 18 6/12/2020  6:58 AM   SpO2 99 % 6/12/2020  6:58 AM         Event Time     Out of Recovery 19:30:00          Pain/Christy Score: Pain Rating Prior to Med Admin: 4 (6/12/2020  5:59 AM)  Christy Score: 10 (6/11/2020  7:30 PM)

## 2020-06-12 NOTE — PT/OT/SLP EVAL
Physical Therapy Evaluation and Discharge Note    Patient Name:  Alissa Mitchell   MRN:  [id number]    Recommendations:     Discharge Recommendations:  home   Discharge Equipment Recommendations: none   Barriers to discharge: None    Assessment:     Alissa Mitchell is a 25 y.o. female admitted with a medical diagnosis of Ulcerative pancolitis. Patient tolerated PT session well. She ambulated ~400ft with no AD and independence. She has no PT needs at discharge. She was educated to ambulate in hallway 3x's/day and she verbalized understanding. PT to sign off.     Recent Surgery: Procedure(s) (LRB):  COLECTOMY, TOTAL, LAPAROSCOPIC, ERAS high and total abdominal colectomy (N/A) 1 Day Post-Op    Plan:     During this hospitalization, patient does not require further acute PT services.  Please re-consult if situation changes.      Subjective     Chief Complaint: Sleepy from the medication.   Patient/Family Comments/goals: To get back to hiking.   Pain/Comfort:  · Pain Rating 1: 0/10    Living Environment:  Patient lives with her roberta and 7 year old daughter in a [facility] with 5 steps to enter. Prior to admission, patients level of function was independent for functional mobility. Equipment used at home: none.  Upon discharge, patient will have assistance from roberta and daughter.    Objective:     Communicated with RN prior to session.  Patient found supine with SCD upon PT entry to room.    General Precautions: Standard, fall   Orthopedic Precautions:N/A   Braces: N/A     Exams:  · Cognitive Exam:  Patient is oriented to Person, Place, Time and Situation  · Sensation:    · -       Intact  · RLE ROM: WNL  · RLE Strength: WNL  · LLE ROM: WNL  · LLE Strength: WNL    Functional Mobility:  · Bed Mobility:     · Supine to Sit: independence  · Transfers:     · Sit to Stand:  independence   · Gait: Patient ambulated ~400ft with independence. No LOB or SOB noted.     Therapeutic Activities and Exercises:   Patient educated in:  -PT  role and POC  -OOB activity to maximize recovery including ambulating 3x's/day in hallway       AM-PAC 6 CLICK MOBILITY  Total Score:24     Patient left standing in room with all lines intact, call button in reach and fiance' present.    GOALS:   Multidisciplinary Problems     Physical Therapy Goals     Not on file          Multidisciplinary Problems (Resolved)        Problem: Physical Therapy Goal    Goal Priority Disciplines Outcome Goal Variances Interventions   Physical Therapy Goal   (Resolved)     PT, PT/OT Met                     History:     Past Medical History:   Diagnosis Date    ADD (attention deficit disorder)     Anxiety     Chronic anemia     Murmur     Ulcerative colitis        Past Surgical History:   Procedure Laterality Date    LAPAROSCOPIC TOTAL COLECTOMY N/A 6/11/2020    Procedure: COLECTOMY, TOTAL, LAPAROSCOPIC, ERAS high and total abdominal colectomy;  Surgeon: Delia Mehta MD;  Location: [facility] OR [address] [place] [address];  Service: Colon and Rectal;  Laterality: N/A;       Time Tracking:     PT Received On: 06/12/20  PT Start Time: 1120     PT Stop Time: 1128  PT Total Time (min): 8 min     Billable Minutes: Evaluation  8     Leslie Patel, PT  06/12/2020

## 2020-06-12 NOTE — HPI
A 26 yo woman with diagnosis of ulcerative colitis (dx at age 18), was admitted for colectomy. Underwent laparoscopic total abdominal colectomy on 6/11/20. Endocrinology was consulted for steroid taper.     Pt reports intermittent use of steroids over the past 7 years. Usually takes Prednisone 20 mg, daily, during UC flare. Has been taking Prednisone 40 mg since one month.

## 2020-06-12 NOTE — ASSESSMENT & PLAN NOTE
S/p colectomy on 6/11/20    Currently on Prednisone 40 mg, daily. As per colo-rectal surgery, pt can be tapered off of steroids completely.     Pt doesn't have any signs/ symptoms of Cushing's syndrome. No evidence of hypertension or hyperglycemia.  But given chronic use of steroids, she is at risk of developing secondary AI.     Recommendations:   -Would recommend decreasing dose of Prednisone to 20 mg, daily, staring tomorrow.  -Would taper Prednisone by 5 mg every week:    -Prednisone 15 mg, daily: 6/20   -Prednisone 10 mg, daily: 6/27   -Prednisone 5 mg, daily: 7/4   -Discontinue Prednisone on 7/11    -Will check 8AM cortisol and ACTH levels OP a few days after completion of the steroid taper.

## 2020-06-12 NOTE — ASSESSMENT & PLAN NOTE
26 yo female s/p lap hand assist total abdominal colectomy for refractory ulcerative colitis    Plan:  - ERAS pathway  - Low res diet  - PO pain and nausea control  - DVT proplylaxis  - OOB/ambulate

## 2020-06-12 NOTE — BRIEF OP NOTE
[facility]  Colon and Rectal Surgery  Operative Note    SUMMARY     Date of Procedure: 6/11/2020     Procedure: Procedure(s) (LRB):  COLECTOMY, TOTAL, LAPAROSCOPIC, ERAS high and total abdominal colectomy (N/A)     Surgeon(s) and Role:     * Delia Mehta MD - Primary     * Marcela Proctor MD - Resident - Assisting        Pre-Operative Diagnosis: Ulcerative pancolitis with rectal bleeding [K51.011]    Post-Operative Diagnosis: Post-Op Diagnosis Codes:     * Ulcerative pancolitis with rectal bleeding [K51.011]    Anesthesia: General    Technical Procedures Used: Lap hand-assist total abdominal colectomy with end ileostomy    Description of the Findings of the Procedure: Chronic inflammatory changes of ulcerative colitis    Significant Surgical Tasks Conducted by the Assistant(s), if Applicable: n/a    Complications: No    Estimated Blood Loss (EBL): * No values recorded between 6/11/2020  4:11 PM and 6/11/2020  7:04 PM *           Implants:   Implant Name Type Inv. Item Serial No.  Lot No. LRB No. Used   MEMBRANE SEPRAFILM 5 X 6 - [id number]  MEMBRANE SEPRAFILM 5 X 6  [employer] [id number] N/A 1       Specimens:   Specimen (12h ago, onward)    None                  Condition: Good    Disposition: PACU - hemodynamically stable.    Attestation: I was present and scrubbed for the entire procedure.

## 2020-06-12 NOTE — PROGRESS NOTES
[facility]  Colorectal Surgery  Progress Note    Patient Name: Alissa Mitchell  MRN: [id number]  Admission Date: 6/11/2020  Hospital Length of Stay: 1 days  Attending Physician: Delia Mehta MD    Subjective:     Interval History: No acute events overnight. Some incisional pain this morning. Garcia just removed. Tolerating clears with minimal nausea.     Post-Op Info:  Procedure(s) (LRB):  COLECTOMY, TOTAL, LAPAROSCOPIC, ERAS high and total abdominal colectomy (N/A)   1 Day Post-Op      Medications:  Continuous Infusions:   sodium chloride 0.9% 40 mL/hr at 06/11/20 2040    sodium chloride 0.9%       Scheduled Meds:   acetaminophen  1,000 mg Intravenous Q8H    Followed by    acetaminophen  1,000 mg Oral Q8H    gabapentin  300 mg Oral TID    gabapentin  300 mg Oral TID    ibuprofen  800 mg Intravenous Q8H    Followed by    ibuprofen  800 mg Oral Q8H    mupirocin   Nasal BID     PRN Meds:   ondansetron    oxyCODONE    oxyCODONE    promethazine (PHENERGAN) IVPB    sodium chloride 0.9%    traMADoL        Objective:     Vital Signs (Most Recent):  Temp: 97.6 °F (36.4 °C) (06/12/20 0658)  Pulse: 70 (06/12/20 0658)  Resp: 18 (06/12/20 0658)  BP: (!) 99/58 (06/12/20 0658)  SpO2: 99 % (06/12/20 0658) Vital Signs (24h Range):  Temp:  [97.6 °F (36.4 °C)-98.8 °F (37.1 °C)] 97.6 °F (36.4 °C)  Pulse:  [66-84] 70  Resp:  [11-24] 18  SpO2:  [97 %-100 %] 99 %  BP: ()/(58-84) 99/58     Intake/Output - Last 3 Shifts       06/10 0700 - 06/11 0659 06/11 0700 - 06/12 0659 06/12 0700 - 06/13 0659    I.V. (mL/kg)  2100 (37.8)     Total Intake(mL/kg)  2100 (37.8)     Urine (mL/kg/hr)  1385     Stool  75     Total Output  1460     Net  +640            Stool Occurrence  0 x           Physical Exam   Constitutional: She is oriented to person, place, and time. She appears well-developed and well-nourished. No distress.   Cardiovascular: Normal rate and regular rhythm.   Pulmonary/Chest: Effort normal.  No respiratory distress.   Abdominal: Soft. She exhibits no distension. There is tenderness (appropriate post op tenderness). There is no rebound and no guarding.   Incisions are clean, dry and intact   Neurological: She is alert and oriented to person, place, and time.   Skin: Skin is warm and dry.       Significant Labs:  BMP:   Recent Labs   Lab 06/12/20  0403         K 4.4      CO2 24   BUN 10   CREATININE 0.7   CALCIUM 8.9   MG 2.0     CBC:   Recent Labs   Lab 06/12/20  0403   WBC 20.30*   RBC 3.93*   HGB 10.0*   HCT 34.0*      MCV 87   MCH 25.4*   MCHC 29.4*       Significant Diagnostics:      Assessment/Plan:     * Ulcerative pancolitis  24 yo female s/p lap hand assist total abdominal colectomy for refractory ulcerative colitis    Plan:  - ERAS pathway  - Low res diet  - PO pain and nausea control  - DVT proplylaxis  - OOB/ambulate          Marcela Proctor MD  Colorectal Surgery  [facility]

## 2020-06-13 PROCEDURE — 25000003 PHARM REV CODE 250: Performed by: STUDENT IN AN ORGANIZED HEALTH CARE EDUCATION/TRAINING PROGRAM

## 2020-06-13 PROCEDURE — 63600175 PHARM REV CODE 636 W HCPCS: Performed by: STUDENT IN AN ORGANIZED HEALTH CARE EDUCATION/TRAINING PROGRAM

## 2020-06-13 PROCEDURE — 99900035 HC TECH TIME PER 15 MIN (STAT)

## 2020-06-13 PROCEDURE — 94761 N-INVAS EAR/PLS OXIMETRY MLT: CPT

## 2020-06-13 PROCEDURE — 20600001 HC STEP DOWN PRIVATE ROOM

## 2020-06-13 RX ORDER — TAMSULOSIN HYDROCHLORIDE 0.4 MG/1
0.4 CAPSULE ORAL DAILY
Status: DISCONTINUED | OUTPATIENT
Start: 2020-06-13 | End: 2020-06-15 | Stop reason: HOSPADM

## 2020-06-13 RX ORDER — PREDNISONE 20 MG/1
20 TABLET ORAL DAILY
Status: DISCONTINUED | OUTPATIENT
Start: 2020-06-13 | End: 2020-06-15 | Stop reason: HOSPADM

## 2020-06-13 RX ADMIN — GABAPENTIN 300 MG: 300 CAPSULE ORAL at 09:06

## 2020-06-13 RX ADMIN — ONDANSETRON HYDROCHLORIDE 4 MG: 2 SOLUTION INTRAMUSCULAR; INTRAVENOUS at 12:06

## 2020-06-13 RX ADMIN — IBUPROFEN 800 MG: 400 TABLET, FILM COATED ORAL at 05:06

## 2020-06-13 RX ADMIN — MUPIROCIN: 20 OINTMENT TOPICAL at 09:06

## 2020-06-13 RX ADMIN — LIDOCAINE 1 PATCH: 50 PATCH TOPICAL at 02:06

## 2020-06-13 RX ADMIN — PREDNISONE 20 MG: 20 TABLET ORAL at 09:06

## 2020-06-13 RX ADMIN — IBUPROFEN 800 MG: 400 TABLET, FILM COATED ORAL at 09:06

## 2020-06-13 RX ADMIN — ONDANSETRON HYDROCHLORIDE 4 MG: 2 SOLUTION INTRAMUSCULAR; INTRAVENOUS at 09:06

## 2020-06-13 RX ADMIN — ENOXAPARIN SODIUM 40 MG: 100 INJECTION SUBCUTANEOUS at 04:06

## 2020-06-13 RX ADMIN — OXYCODONE HYDROCHLORIDE 10 MG: 10 TABLET ORAL at 04:06

## 2020-06-13 RX ADMIN — IBUPROFEN 800 MG: 400 TABLET, FILM COATED ORAL at 02:06

## 2020-06-13 RX ADMIN — TAMSULOSIN HYDROCHLORIDE 0.4 MG: 0.4 CAPSULE ORAL at 09:06

## 2020-06-13 RX ADMIN — GABAPENTIN 300 MG: 300 CAPSULE ORAL at 02:06

## 2020-06-13 RX ADMIN — ACETAMINOPHEN 1000 MG: 500 TABLET ORAL at 05:06

## 2020-06-13 RX ADMIN — ACETAMINOPHEN 1000 MG: 500 TABLET ORAL at 09:06

## 2020-06-13 RX ADMIN — OXYCODONE HYDROCHLORIDE 10 MG: 10 TABLET ORAL at 11:06

## 2020-06-13 NOTE — NURSING
POC reviewed with pt. VSS on RA. AAOX4. Remains free of falls and injury.     Lap sites with DB dry and intact - no redness noted, pain around sites stated by pt - lidocaine patch applied. Tolerating low fiber, low residue diet, denies nausea. Pain controlled with PRN medications per MAR. Independent IS use. Garcia catheter draining clear urine to gravity bag - emptied when urimeter full per pt request. RLQ ileostomy dry and intact putting out thick dark brown drainage to bag - WC to come tomorrow and provide consult teaching.     Bed in lowest position, call light within reach, frequent rounds made for safety. Partner at bedside.

## 2020-06-13 NOTE — ASSESSMENT & PLAN NOTE
26 yo female s/p lap hand assist total abdominal colectomy for refractory ulcerative colitis    Plan:  - ERAS pathway  - Low res diet  - PO pain and nausea control  - Continue barron for today, discontinue tomorrow morning  - DVT proplylaxis  - OOB/ambulate

## 2020-06-13 NOTE — SUBJECTIVE & OBJECTIVE
Subjective:     Interval History: Urinary retention yesterday afternoon requiring replacement of barron catheter. Suprapubic pain greatly improved. Great UOP. Tolerating diet without nausea or vomiting. 350 out of ileostomy.       Post-Op Info:  Procedure(s) (LRB):  COLECTOMY, TOTAL, LAPAROSCOPIC, ERAS high and total abdominal colectomy (N/A)   2 Days Post-Op      Medications:  Continuous Infusions:   sodium chloride 0.9%       Scheduled Meds:   acetaminophen  1,000 mg Oral Q8H    enoxaparin  40 mg Subcutaneous Daily    gabapentin  300 mg Oral TID    gabapentin  300 mg Oral TID    ibuprofen  800 mg Oral Q8H    lidocaine  1 patch Transdermal Q24H    mupirocin   Nasal BID    predniSONE  40 mg Oral Daily     PRN Meds:   ondansetron    oxyCODONE    oxyCODONE    promethazine (PHENERGAN) IVPB    sodium chloride 0.9%    traMADoL        Objective:     Vital Signs (Most Recent):  Temp: 98.3 °F (36.8 °C) (06/13/20 0450)  Pulse: 69 (06/13/20 0450)  Resp: 16 (06/13/20 0450)  BP: 119/74 (06/13/20 0450)  SpO2: 100 % (06/13/20 0450) Vital Signs (24h Range):  Temp:  [98 °F (36.7 °C)-98.7 °F (37.1 °C)] 98.3 °F (36.8 °C)  Pulse:  [56-80] 69  Resp:  [15-18] 16  SpO2:  [98 %-100 %] 100 %  BP: ()/(52-83) 119/74     Intake/Output - Last 3 Shifts       06/11 0700 - 06/12 0659 06/12 0700 - 06/13 0659 06/13 0700 - 06/14 0659    P.O.  930     I.V. (mL/kg) 2100 (37.8)      Total Intake(mL/kg) 2100 (37.8) 930 (16.7)     Urine (mL/kg/hr) 1385 4250 (3.2)     Stool 75 350     Total Output 1460 4600     Net +640 -3670            Stool Occurrence 0 x            Physical Exam  Constitutional:       General: She is not in acute distress.     Appearance: She is well-developed and well-nourished.   Cardiovascular:      Rate and Rhythm: Normal rate and regular rhythm.   Pulmonary:      Effort: Pulmonary effort is normal. No respiratory distress.   Abdominal:      General: There is no distension.      Palpations: Abdomen is soft.       Tenderness: There is abdominal tenderness (appropriate post op tenderness). There is no guarding or rebound.      Comments: Incisions are clean, dry and intact   Skin:     General: Skin is warm and dry.   Neurological:      Mental Status: She is alert and oriented to person, place, and time.         Significant Labs:  BMP:   Recent Labs   Lab 06/12/20  0403 06/12/20  1523    127*    137   K 4.4 3.9    104   CO2 24 24   BUN 10 9   CREATININE 0.7 0.8   CALCIUM 8.9 8.7   MG 2.0  --      CBC:   Recent Labs   Lab 06/12/20  1523   WBC 18.50*   RBC 4.07   HGB 10.4*   HCT 34.4*      MCV 85   MCH 25.6*   MCHC 30.2*

## 2020-06-13 NOTE — PROGRESS NOTES
[facility]  Colorectal Surgery  Progress Note    Patient Name: Alissa Mitchell  MRN: [id number]  Admission Date: 6/11/2020  Hospital Length of Stay: 2 days  Attending Physician: Delia Mehta MD    Subjective:     Interval History: Urinary retention yesterday afternoon requiring replacement of barron catheter. Suprapubic pain greatly improved. Great UOP. Tolerating diet without nausea or vomiting. 350 out of ileostomy.       Post-Op Info:  Procedure(s) (LRB):  COLECTOMY, TOTAL, LAPAROSCOPIC, ERAS high and total abdominal colectomy (N/A)   2 Days Post-Op      Medications:  Continuous Infusions:   sodium chloride 0.9%       Scheduled Meds:   acetaminophen  1,000 mg Oral Q8H    enoxaparin  40 mg Subcutaneous Daily    gabapentin  300 mg Oral TID    gabapentin  300 mg Oral TID    ibuprofen  800 mg Oral Q8H    lidocaine  1 patch Transdermal Q24H    mupirocin   Nasal BID    predniSONE  40 mg Oral Daily     PRN Meds:   ondansetron    oxyCODONE    oxyCODONE    promethazine (PHENERGAN) IVPB    sodium chloride 0.9%    traMADoL        Objective:     Vital Signs (Most Recent):  Temp: 98.3 °F (36.8 °C) (06/13/20 0450)  Pulse: 69 (06/13/20 0450)  Resp: 16 (06/13/20 0450)  BP: 119/74 (06/13/20 0450)  SpO2: 100 % (06/13/20 0450) Vital Signs (24h Range):  Temp:  [98 °F (36.7 °C)-98.7 °F (37.1 °C)] 98.3 °F (36.8 °C)  Pulse:  [56-80] 69  Resp:  [15-18] 16  SpO2:  [98 %-100 %] 100 %  BP: ()/(52-83) 119/74     Intake/Output - Last 3 Shifts       06/11 0700 - 06/12 0659 06/12 0700 - 06/13 0659 06/13 0700 - 06/14 0659    P.O.  930     I.V. (mL/kg) 2100 (37.8)      Total Intake(mL/kg) 2100 (37.8) 930 (16.7)     Urine (mL/kg/hr) 1385 4250 (3.2)     Stool 75 350     Total Output 1460 4600     Net +640 -3670            Stool Occurrence 0 x            Physical Exam  Constitutional:       General: She is not in acute distress.     Appearance: She is well-developed and well-nourished.   Cardiovascular:       Rate and Rhythm: Normal rate and regular rhythm.   Pulmonary:      Effort: Pulmonary effort is normal. No respiratory distress.   Abdominal:      General: There is no distension.      Palpations: Abdomen is soft.      Tenderness: There is abdominal tenderness (appropriate post op tenderness). There is no guarding or rebound.      Comments: Incisions are clean, dry and intact   Skin:     General: Skin is warm and dry.   Neurological:      Mental Status: She is alert and oriented to person, place, and time.         Significant Labs:  BMP:   Recent Labs   Lab 06/12/20  0403 06/12/20  1523    127*    137   K 4.4 3.9    104   CO2 24 24   BUN 10 9   CREATININE 0.7 0.8   CALCIUM 8.9 8.7   MG 2.0  --      CBC:   Recent Labs   Lab 06/12/20  1523   WBC 18.50*   RBC 4.07   HGB 10.4*   HCT 34.4*      MCV 85   MCH 25.6*   MCHC 30.2*           Assessment/Plan:     * Ulcerative pancolitis  26 yo female s/p lap hand assist total abdominal colectomy for refractory ulcerative colitis    Plan:  - ERAS pathway  - Low res diet  - PO pain and nausea control  - Continue barron for today, discontinue tomorrow morning  - DVT proplylaxis  - OOB/ambulate          Jessica Romo MD  Colorectal Surgery  [facility]

## 2020-06-13 NOTE — NURSING
"Pt crying in pain and stating "pulling/cramping" pain in bladder and sharp pain near left and R lap sites. Administered pain medication and notified Dr. Edgar. No new orders given at this time. WCTM.   "

## 2020-06-13 NOTE — PLAN OF CARE
"Plan of care reviewed with patient who verbalized understanding. AAOx4. VSS on room air. Pt tolerating LFLR diet. PRN Zofran given for nausea. PRN Oxycodone given for pain. Lower abdominal transverse incision w/ DB; CDI. X3 lap sites w/ DB; DCI. Right LQ ileostomy. Pt educated on the right way to empty ileostomy when ready. Garcia in place. Pt still complaining of tenderness around site when "bladder feels like its feeling up". Safety precautions maintained throughout shift. Patient mostly slept in between care. No acute events this shift. WCTM.     "

## 2020-06-14 LAB — CRP SERPL-MCNC: 3.2 MG/L (ref 0–8.2)

## 2020-06-14 PROCEDURE — 63600175 PHARM REV CODE 636 W HCPCS: Performed by: STUDENT IN AN ORGANIZED HEALTH CARE EDUCATION/TRAINING PROGRAM

## 2020-06-14 PROCEDURE — 25000003 PHARM REV CODE 250: Performed by: STUDENT IN AN ORGANIZED HEALTH CARE EDUCATION/TRAINING PROGRAM

## 2020-06-14 PROCEDURE — 20600001 HC STEP DOWN PRIVATE ROOM

## 2020-06-14 PROCEDURE — 94761 N-INVAS EAR/PLS OXIMETRY MLT: CPT

## 2020-06-14 PROCEDURE — 86140 C-REACTIVE PROTEIN: CPT

## 2020-06-14 PROCEDURE — 36415 COLL VENOUS BLD VENIPUNCTURE: CPT

## 2020-06-14 RX ORDER — DOXYLAMINE SUCCINATE 25 MG
TABLET ORAL 2 TIMES DAILY
Status: DISCONTINUED | OUTPATIENT
Start: 2020-06-14 | End: 2020-06-15 | Stop reason: HOSPADM

## 2020-06-14 RX ADMIN — MUPIROCIN: 20 OINTMENT TOPICAL at 09:06

## 2020-06-14 RX ADMIN — IBUPROFEN 800 MG: 400 TABLET, FILM COATED ORAL at 09:06

## 2020-06-14 RX ADMIN — OXYCODONE HYDROCHLORIDE 10 MG: 10 TABLET ORAL at 09:06

## 2020-06-14 RX ADMIN — ONDANSETRON HYDROCHLORIDE 4 MG: 2 SOLUTION INTRAMUSCULAR; INTRAVENOUS at 07:06

## 2020-06-14 RX ADMIN — GABAPENTIN 300 MG: 300 CAPSULE ORAL at 02:06

## 2020-06-14 RX ADMIN — MICONAZOLE NITRATE: 20 CREAM TOPICAL at 04:06

## 2020-06-14 RX ADMIN — ENOXAPARIN SODIUM 40 MG: 100 INJECTION SUBCUTANEOUS at 04:06

## 2020-06-14 RX ADMIN — ACETAMINOPHEN 1000 MG: 500 TABLET ORAL at 01:06

## 2020-06-14 RX ADMIN — LIDOCAINE 1 PATCH: 50 PATCH TOPICAL at 03:06

## 2020-06-14 RX ADMIN — GABAPENTIN 300 MG: 300 CAPSULE ORAL at 09:06

## 2020-06-14 RX ADMIN — OXYCODONE HYDROCHLORIDE 10 MG: 10 TABLET ORAL at 01:06

## 2020-06-14 RX ADMIN — IBUPROFEN 800 MG: 400 TABLET, FILM COATED ORAL at 01:06

## 2020-06-14 RX ADMIN — ACETAMINOPHEN 1000 MG: 500 TABLET ORAL at 06:06

## 2020-06-14 RX ADMIN — IBUPROFEN 800 MG: 400 TABLET, FILM COATED ORAL at 06:06

## 2020-06-14 RX ADMIN — TAMSULOSIN HYDROCHLORIDE 0.4 MG: 0.4 CAPSULE ORAL at 09:06

## 2020-06-14 RX ADMIN — ACETAMINOPHEN 1000 MG: 500 TABLET ORAL at 09:06

## 2020-06-14 RX ADMIN — PREDNISONE 20 MG: 20 TABLET ORAL at 09:06

## 2020-06-14 RX ADMIN — MICONAZOLE NITRATE: 20 CREAM TOPICAL at 09:06

## 2020-06-14 NOTE — ASSESSMENT & PLAN NOTE
24 yo female s/p lap hand assist total abdominal colectomy for refractory ulcerative colitis    Plan:  - ERAS pathway  - Low res diet  - PO pain and nausea control  - Continue flowmax  - DVT proplylaxis  - OOB/ambulate

## 2020-06-14 NOTE — SUBJECTIVE & OBJECTIVE
Subjective:     Interval History: Garcia removed this morning, able to void and feels she is emptying fully. Tolerating diet with some food sensitivities that she had previously. Good ostomy output. Feels well.     Post-Op Info:  Procedure(s) (LRB):  COLECTOMY, TOTAL, LAPAROSCOPIC, ERAS high and total abdominal colectomy (N/A)   3 Days Post-Op      Medications:  Continuous Infusions:   sodium chloride 0.9%       Scheduled Meds:   acetaminophen  1,000 mg Oral Q8H    enoxaparin  40 mg Subcutaneous Daily    gabapentin  300 mg Oral TID    gabapentin  300 mg Oral TID    ibuprofen  800 mg Oral Q8H    lidocaine  1 patch Transdermal Q24H    mupirocin   Nasal BID    predniSONE  20 mg Oral Daily    tamsulosin  0.4 mg Oral Daily     PRN Meds:   ondansetron    oxyCODONE    oxyCODONE    promethazine (PHENERGAN) IVPB    sodium chloride 0.9%    traMADoL        Objective:     Vital Signs (Most Recent):  Temp: 98 °F (36.7 °C) (06/14/20 0741)  Pulse: 64 (06/14/20 0741)  Resp: 18 (06/14/20 0741)  BP: 104/63 (06/14/20 0741)  SpO2: 100 % (06/14/20 0741) Vital Signs (24h Range):  Temp:  [97.8 °F (36.6 °C)-98.2 °F (36.8 °C)] 98 °F (36.7 °C)  Pulse:  [63-94] 64  Resp:  [16-20] 18  SpO2:  [99 %-100 %] 100 %  BP: (104-126)/(62-77) 104/63     Intake/Output - Last 3 Shifts       06/12 0700 - 06/13 0659 06/13 0700 - 06/14 0659 06/14 0700 - 06/15 0659    P.O. 930 1865 400    I.V. (mL/kg)       Total Intake(mL/kg) 930 (16.7) 1865 (33.5) 400 (7.2)    Urine (mL/kg/hr) 4250 (3.2) 3200 (2.4) 400 (3)    Emesis/NG output  0 0    Other  0 0    Stool 350 550 0    Blood  0 0    Total Output 4600 3750 400    Net -3670 -1885 0           Urine Occurrence  0 x 0 x    Stool Occurrence  1 x 1 x    Emesis Occurrence  0 x 0 x          Physical Exam  Constitutional:       General: She is not in acute distress.     Appearance: She is well-developed and well-nourished.   Cardiovascular:      Rate and Rhythm: Normal rate and regular rhythm.    Pulmonary:      Effort: Pulmonary effort is normal. No respiratory distress.   Abdominal:      General: There is no distension.      Palpations: Abdomen is soft.      Tenderness: There is abdominal tenderness (appropriate post op tenderness). There is no guarding or rebound.      Comments: Incisions are clean, dry and intact   Skin:     General: Skin is warm and dry.   Neurological:      Mental Status: She is alert and oriented to person, place, and time.         Significant Labs:  BMP:   Recent Labs   Lab 06/12/20  0403 06/12/20  1523    127*    137   K 4.4 3.9    104   CO2 24 24   BUN 10 9   CREATININE 0.7 0.8   CALCIUM 8.9 8.7   MG 2.0  --      CBC:   Recent Labs   Lab 06/12/20  1523   WBC 18.50*   RBC 4.07   HGB 10.4*   HCT 34.4*      MCV 85   MCH 25.6*   MCHC 30.2*

## 2020-06-14 NOTE — PLAN OF CARE
Plan of care reviewed with patient who verbalized understanding. AAOx4. VSS on room air. LFLR diet. PRN Zofran given for nausea. Lower abdominal transverse incision w/ DB SIN; CDI. X3 lap sites with DB SIN; CDI. RLQ ileostomy. Pt due to void between 0430 and 0630. Safety precautions maintained throughout shift. Patient mostly slept in between care. No acute events this shift. WCTM.

## 2020-06-14 NOTE — NURSING
POC reviewed with pt. VSS on RA. AAOX4. Remains free of falls and injury.      Lap sites with DB dry and intact. Tolerating low fiber, low residue diet, denies nausea. Incisional pain controlled with PRN medications per MAR. Independent IS use. Up to toilet independently and urinating adequately. RLQ ileostomy dry and intact putting out thick light brown drainage to bag. Pt reported feeling bloated - MD aware - no new orders placed. Cream applied to vaginal area for itching.      Bed in lowest position, call light within reach, frequent rounds made for safety. Partner at bedside. Partner at bedside.

## 2020-06-14 NOTE — PROGRESS NOTES
[facility]  Colorectal Surgery  Progress Note    Patient Name: Alissa Mitchell  MRN: [id number]  Admission Date: 6/11/2020  Hospital Length of Stay: 3 days  Attending Physician: Delia Mehta MD    Subjective:     Interval History: Garcia removed this morning, able to void and feels she is emptying fully. Tolerating diet with some food sensitivities that she had previously. Good ostomy output. Feels well.     Post-Op Info:  Procedure(s) (LRB):  COLECTOMY, TOTAL, LAPAROSCOPIC, ERAS high and total abdominal colectomy (N/A)   3 Days Post-Op      Medications:  Continuous Infusions:   sodium chloride 0.9%       Scheduled Meds:   acetaminophen  1,000 mg Oral Q8H    enoxaparin  40 mg Subcutaneous Daily    gabapentin  300 mg Oral TID    gabapentin  300 mg Oral TID    ibuprofen  800 mg Oral Q8H    lidocaine  1 patch Transdermal Q24H    mupirocin   Nasal BID    predniSONE  20 mg Oral Daily    tamsulosin  0.4 mg Oral Daily     PRN Meds:   ondansetron    oxyCODONE    oxyCODONE    promethazine (PHENERGAN) IVPB    sodium chloride 0.9%    traMADoL        Objective:     Vital Signs (Most Recent):  Temp: 98 °F (36.7 °C) (06/14/20 0741)  Pulse: 64 (06/14/20 0741)  Resp: 18 (06/14/20 0741)  BP: 104/63 (06/14/20 0741)  SpO2: 100 % (06/14/20 0741) Vital Signs (24h Range):  Temp:  [97.8 °F (36.6 °C)-98.2 °F (36.8 °C)] 98 °F (36.7 °C)  Pulse:  [63-94] 64  Resp:  [16-20] 18  SpO2:  [99 %-100 %] 100 %  BP: (104-126)/(62-77) 104/63     Intake/Output - Last 3 Shifts       06/12 0700 - 06/13 0659 06/13 0700 - 06/14 0659 06/14 0700 - 06/15 0659    P.O. 930 1865 400    I.V. (mL/kg)       Total Intake(mL/kg) 930 (16.7) 1865 (33.5) 400 (7.2)    Urine (mL/kg/hr) 4250 (3.2) 3200 (2.4) 400 (3)    Emesis/NG output  0 0    Other  0 0    Stool 350 550 0    Blood  0 0    Total Output 4600 3750 400    Net -6724 -6386 0           Urine Occurrence  0 x 0 x    Stool Occurrence  1 x 1 x    Emesis Occurrence  0 x 0 x           Physical Exam  Constitutional:       General: She is not in acute distress.     Appearance: She is well-developed and well-nourished.   Cardiovascular:      Rate and Rhythm: Normal rate and regular rhythm.   Pulmonary:      Effort: Pulmonary effort is normal. No respiratory distress.   Abdominal:      General: There is no distension.      Palpations: Abdomen is soft.      Tenderness: There is abdominal tenderness (appropriate post op tenderness). There is no guarding or rebound.      Comments: Incisions are clean, dry and intact   Skin:     General: Skin is warm and dry.   Neurological:      Mental Status: She is alert and oriented to person, place, and time.         Significant Labs:  BMP:   Recent Labs   Lab 06/12/20  0403 06/12/20  1523    127*    137   K 4.4 3.9    104   CO2 24 24   BUN 10 9   CREATININE 0.7 0.8   CALCIUM 8.9 8.7   MG 2.0  --      CBC:   Recent Labs   Lab 06/12/20  1523   WBC 18.50*   RBC 4.07   HGB 10.4*   HCT 34.4*      MCV 85   MCH 25.6*   MCHC 30.2*           Assessment/Plan:     * Ulcerative pancolitis  24 yo female s/p lap hand assist total abdominal colectomy for refractory ulcerative colitis    Plan:  - ERAS pathway  - Low res diet  - PO pain and nausea control  - Continue flowmax  - DVT proplylaxis  - OOB/ambulate          Marcela Proctor MD  Colorectal Surgery  [facility]

## 2020-06-15 VITALS
SYSTOLIC BLOOD PRESSURE: 136 MMHG | HEIGHT: 63 IN | HEART RATE: 87 BPM | RESPIRATION RATE: 18 BRPM | WEIGHT: 122.56 LBS | TEMPERATURE: 98 F | BODY MASS INDEX: 21.71 KG/M2 | DIASTOLIC BLOOD PRESSURE: 87 MMHG | OXYGEN SATURATION: 98 %

## 2020-06-15 LAB — CRP SERPL-MCNC: 1.6 MG/L (ref 0–8.2)

## 2020-06-15 PROCEDURE — 25000003 PHARM REV CODE 250: Performed by: STUDENT IN AN ORGANIZED HEALTH CARE EDUCATION/TRAINING PROGRAM

## 2020-06-15 PROCEDURE — 27000192 HC POUCH, WOUND DRAINAGE COLLECTOR (ANY SIZE)

## 2020-06-15 PROCEDURE — 36415 COLL VENOUS BLD VENIPUNCTURE: CPT

## 2020-06-15 PROCEDURE — 63600175 PHARM REV CODE 636 W HCPCS: Performed by: STUDENT IN AN ORGANIZED HEALTH CARE EDUCATION/TRAINING PROGRAM

## 2020-06-15 PROCEDURE — 86140 C-REACTIVE PROTEIN: CPT

## 2020-06-15 RX ORDER — PREDNISONE 5 MG/1
TABLET ORAL
Qty: 42 TABLET | Refills: 0 | Status: ON HOLD | OUTPATIENT
Start: 2020-06-20 | End: 2020-11-09

## 2020-06-15 RX ORDER — PREDNISONE 20 MG/1
20 TABLET ORAL DAILY
Qty: 4 TABLET | Refills: 0 | Status: SHIPPED | OUTPATIENT
Start: 2020-06-15 | End: 2020-06-19

## 2020-06-15 RX ORDER — ONDANSETRON 4 MG/1
4 TABLET, FILM COATED ORAL EVERY 6 HOURS PRN
Qty: 30 TABLET | Refills: 0 | Status: SHIPPED | OUTPATIENT
Start: 2020-06-15 | End: 2020-07-02 | Stop reason: SDUPTHER

## 2020-06-15 RX ORDER — OXYCODONE HYDROCHLORIDE 5 MG/1
5 TABLET ORAL EVERY 6 HOURS PRN
Qty: 30 TABLET | Refills: 0 | Status: SHIPPED | OUTPATIENT
Start: 2020-06-15 | End: 2020-06-16 | Stop reason: DRUGHIGH

## 2020-06-15 RX ORDER — TAMSULOSIN HYDROCHLORIDE 0.4 MG/1
0.4 CAPSULE ORAL DAILY
Qty: 14 CAPSULE | Refills: 0 | Status: ON HOLD | OUTPATIENT
Start: 2020-06-15 | End: 2020-11-05

## 2020-06-15 RX ADMIN — OXYCODONE HYDROCHLORIDE 10 MG: 10 TABLET ORAL at 09:06

## 2020-06-15 RX ADMIN — PREDNISONE 20 MG: 20 TABLET ORAL at 09:06

## 2020-06-15 RX ADMIN — TAMSULOSIN HYDROCHLORIDE 0.4 MG: 0.4 CAPSULE ORAL at 09:06

## 2020-06-15 RX ADMIN — MICONAZOLE NITRATE: 20 CREAM TOPICAL at 09:06

## 2020-06-15 RX ADMIN — ACETAMINOPHEN 1000 MG: 500 TABLET ORAL at 05:06

## 2020-06-15 RX ADMIN — MUPIROCIN: 20 OINTMENT TOPICAL at 09:06

## 2020-06-15 RX ADMIN — GABAPENTIN 300 MG: 300 CAPSULE ORAL at 09:06

## 2020-06-15 RX ADMIN — IBUPROFEN 800 MG: 400 TABLET, FILM COATED ORAL at 05:06

## 2020-06-15 NOTE — CONSULTS
Ostomy care consulted for Ileostomy teaching.      Patient seen for new ostomy consult. Ostomy education begun.  Goals of education include:     Pouch emptying, sizing/cuting pouch, and applying pouch   Stoma and susan-stomal care   Food choices and hydration   Obtaining supplies    Discussed and demonstrated sizing the stoma, cutting ostomy pouch and emptying pouch of stool and gas and applying pouch over stoma.  Patient returned demonstration without difficulty.  Discussed meal planning with particular foods to avoid.  Discussed importance of hydration and increasing fluid intake.  Explained process of ordering supplies to be delivered to patients home. Provided reading materials regarding todays training.  Supplies at bedside, samples ordered, discussed intake and output recording, when to notify the MD.  Patient verbalized understanding.  She explained all that she learned to her boyfriend when in arrived. Booklets/papers on managing Ileostomy, Intake and output sheet, Food Guide, type of pouches, and post op exercises given and explained to patient. Contact numbers given to patient if needed.     The patient discussed a feeling like a baby kicking my lower left abdomen at times previous to admit.  MD notified per secure chat.  The patient discussed her daughter not really understanding what was happening to mom- Coloplast Gaurav's story given to mom for daughter.  She feels confident in caring for her stoma at home and her follow-up appointment.

## 2020-06-15 NOTE — HPI
Patient is a 25 y.o. female presents for surgical management of ulcerative colitis.     Ulcerative colitis diagnosed 7 years ago.  Has been treated with multiple courses of prednisone.  Was on Remicade in the past, her insurance stopped paying for it.  Has also tried a Apriso and Imuran, which did not work per report.  Most recently on Tofacitinib and Balsalazide since 2018.  Most recent GI note states that she has self-discontinued these 2 medications and is currently on prednisone (40mg/day) and Balsalazide.  Before prednisone was having >7 BMs/day with blood and mucous in stool.  Also have cramping and nausea/emesis.  Has lost 30lbs since March. Has some trouble with control of liquid stool.  Had a prior vaginal delivery with suction and episiotomy (7 years ago)  No prior abdominal surgery. Denies smoking or EtOH.  Works for [employer].  Denies arthritis, rashes or perianal disease.  She lives with her boyfriend and 7-year-old daughter.  States that she has been with her boyfriend for a long time and he is a good source of support for her.     Most recent colonoscopy (March 2, 2020).  This demonstrated erythema, ulceration, and friability from the rectum to the splenic flexure.  Pathology from descending and sigmoid colon biopsies returned as chronic active colitis without granulomas.    Colonoscopy March 14, 2018- disease from rectum to mid descending colon  Colonoscopy January 2019- 'rectal scar tissue'

## 2020-06-15 NOTE — PLAN OF CARE
06/15/20 1100   Final Note   Assessment Type Final Discharge Note   Anticipated Discharge Disposition Home   Right Care Referral Info   Post Acute Recommendation No Care   Post-Acute Status   Other Status No Post-Acute Service Needs

## 2020-06-15 NOTE — DISCHARGE SUMMARY
[facility]  Colorectal Surgery  Discharge Summary      Patient Name: Alissa Springer  MRN: [id number]  Admission Date: 6/11/2020  Hospital Length of Stay: 4 days  Discharge Date and Time:  06/15/2020 7:47 AM  Attending Physician: Delia Mehta MD   Discharging Provider: Jessica Romo MD  Primary Care Provider: Primary Doctor No     HPI:  Patient is a 25 y.o. female presents for surgical management of ulcerative colitis.     Ulcerative colitis diagnosed 7 years ago.  Has been treated with multiple courses of prednisone.  Was on Remicade in the past, her insurance stopped paying for it.  Has also tried a Apriso and Imuran, which did not work per report.  Most recently on Tofacitinib and Balsalazide since 2018.  Most recent GI note states that she has self-discontinued these 2 medications and is currently on prednisone (40mg/day) and Balsalazide.  Before prednisone was having >7 BMs/day with blood and mucous in stool.  Also have cramping and nausea/emesis.  Has lost 30lbs since March. Has some trouble with control of liquid stool.  Had a prior vaginal delivery with suction and episiotomy (7 years ago)  No prior abdominal surgery. Denies smoking or EtOH.  Works for [employer].  Denies arthritis, rashes or perianal disease.  She lives with her boyfriend and 7-year-old daughter.  States that she has been with her boyfriend for a long time and he is a good source of support for her.     Most recent colonoscopy (March 2, 2020).  This demonstrated erythema, ulceration, and friability from the rectum to the splenic flexure.  Pathology from descending and sigmoid colon biopsies returned as chronic active colitis without granulomas.    Colonoscopy March 14, 2018- disease from rectum to mid descending colon  Colonoscopy January 2019- 'rectal scar tissue'    Procedure(s) (LRB):  COLECTOMY, TOTAL, LAPAROSCOPIC, ERAS high and total abdominal colectomy (N/A)     Hospital Course:  ALISSA SPRINGER 25 y.o.female  underwent: Procedure(s) (LRB):  COLECTOMY, TOTAL, LAPAROSCOPIC, ERAS high and total abdominal colectomy (N/A). Tolerated procedure well, was transferred to  then to regular floor post op. Please see the dictated operative note for further procedure details.   Hospital course was complicated by urinary retention on POD 1 requiring reinsertion of barron catheter. Catheter was removed one day later and patient was voiding without difficulty prior to discharge.  Vitals remained stable, afebrile. Labs were reviewed and electrolytes replaced appropriately.   Physical exam was appropriate for post operative state.   Was able to tolerate a regular diet as it was advanced in an appropriate surgical manner.   Was able to ambulate and void without difficulty prior to discharge. Ileostomy with appropriate amount of output.   Pain and nausea controlled with PRN medications.   Deemed suitable for discharge on 4 Days Post-Op      Physical Exam   Constitutional: She is oriented to person, place, and time and well-developed, well-nourished, and in no distress. No distress.   HENT:   Head: Normocephalic and atraumatic.   Eyes: EOM are normal. No scleral icterus.   Neck: Normal range of motion. No tracheal deviation present.   Cardiovascular: Normal rate and intact distal pulses.   Pulmonary/Chest: Effort normal. No respiratory distress.   Abdominal: Soft. She exhibits no distension. There is abdominal tenderness (appropriate incisional tenderness).   Ileostomy pink and healthy appearing with stool in ostomy appliance.   Incisions c/d/i with dermabond covering. No drainage from wounds or surrounding erythema.    Musculoskeletal: Normal range of motion.         General: No edema.   Neurological: She is alert and oriented to person, place, and time.   Skin: Skin is warm and dry. She is not diaphoretic.   Vitals reviewed.      Consults (From admission, onward)        Status Ordering Provider     Inpatient consult to Endocrinology  Once      Provider:  (Not yet assigned)    LENIN Yanez          Significant Diagnostic Studies: Labs: BMP: No results for input(s): GLU, NA, K, CL, CO2, BUN, CREATININE, CALCIUM, MG in the last 48 hours. and CBC No results for input(s): WBC, HGB, HCT, PLT in the last 48 hours.    Pending Diagnostic Studies:     Procedure Component Value Units Date/Time    Specimen to Pathology, Surgery Gastrointestinal tract [[id number]] Collected: 06/11/20 1825    Order Status: Sent Lab Status: In process Updated: 06/12/20 1028        Final Active Diagnoses:    Diagnosis Date Noted POA    PRINCIPAL PROBLEM:  Ulcerative pancolitis [K51.00] 06/11/2020 Yes      Problems Resolved During this Admission:      Discharged Condition: good    Disposition: Home or Self Care    Follow Up:  Follow-up Information     Delia Mehta MD. Schedule an appointment as soon as possible for a visit in 2 weeks.    Specialty: Colon and Rectal Surgery  Why: Post-operative follow up  Contact information:  [address] [place] [id number]  [phone]                 Patient Instructions:      Diet Adult Regular     No driving until:   Order Comments: No driving while still taking pain medications daily.   Take a stool softener while taking pain medications daily.     Lifting restrictions   Order Comments: No lifting more than 10 lbs for 6 weeks.     Pelvic Rest     Notify your health care provider if you experience any of the following:  increased confusion or weakness     Notify your health care provider if you experience any of the following:  persistent dizziness, light-headedness, or visual disturbances     Notify your health care provider if you experience any of the following:  worsening rash     Notify your health care provider if you experience any of the following:  severe persistent headache     Notify your health care provider if you experience any of the following:  difficulty breathing or increased cough     Notify your  health care provider if you experience any of the following:  redness, tenderness, or signs of infection (pain, swelling, redness, odor or green/yellow discharge around incision site)     Notify your health care provider if you experience any of the following:  severe uncontrolled pain     Notify your health care provider if you experience any of the following:  persistent nausea and vomiting or diarrhea     Notify your health care provider if you experience any of the following:  temperature >100.4     No dressing needed   Order Comments: Okay to take shower and get incision wet. Do NOT soak/submerge incision in water (aka no bathing, swimming, etc). Skin glue overlying incisions will begin to fall off on its own in 2-3 weeks.     Activity as tolerated     Medications:  Reconciled Home Medications:      Medication List      START taking these medications    ondansetron 4 MG tablet  Commonly known as: ZOFRAN  Take 1 tablet (4 mg total) by mouth every 6 (six) hours as needed for Nausea.     oxyCODONE 5 MG immediate release tablet  Commonly known as: ROXICODONE  Take 1 tablet (5 mg total) by mouth every 6 (six) hours as needed for Pain.     tamsulosin 0.4 mg Cap  Commonly known as: FLOMAX  Take 1 capsule (0.4 mg total) by mouth once daily. for 14 days        CHANGE how you take these medications    * predniSONE 20 MG tablet  Commonly known as: DELTASONE  Take 1 tablet (20 mg total) by mouth once daily. for 4 days  What changed:   · medication strength  · how much to take     * predniSONE 5 MG tablet  Commonly known as: DELTASONE  Take 3 tablets daily starting on 6/20/20 and ending on 6/26/20. Take 2 tables daily starting on 6/27/20 and ending on 7/3/20. Take 1 tablet daily starting on 7/4/20 and ending on 7/10/20. Discontinue prednisone on 7/11/20.  Start taking on: June 20, 2020  What changed: You were already taking a medication with the same name, and this prescription was added. Make sure you understand how and  when to take each.         * This list has 2 medication(s) that are the same as other medications prescribed for you. Read the directions carefully, and ask your doctor or other care provider to review them with you.            CONTINUE taking these medications    balsalazide 750 mg capsule  Commonly known as: COLAZAL  Take 2,250 mg by mouth 3 (three) times daily.     dicyclomine 20 mg tablet  Commonly known as: BENTYL  Take 20 mg by mouth every 6 (six) hours.     promethazine 25 MG tablet  Commonly known as: PHENERGAN  Take 25 mg by mouth every 6 (six) hours as needed for Nausea.        STOP taking these medications    metroNIDAZOLE 500 MG tablet  Commonly known as: FLAGYL     neomycin 500 mg Tab  Commonly known as: MYCIFRADIN     polyethylene glycol 17 gram/dose powder  Commonly known as: GLYCOLAX            Jessica Romo MD  Colorectal Surgery  [facility]

## 2020-06-15 NOTE — PLAN OF CARE
Plan of care reviewed with patient who verbalized understanding. AAOx4. VSS on room air. LFLR diet. Having some sensitivities to some foods but overall tolerating diet. PRN Zofran given for nausea. Lower abdominal transverse incision w/ DB SIN; CDI. X3 lap sites with DB SIN; CDI. RLQ ileostomy. Pt up to the bathroom independently. Safety precautions maintained throughout shift. Patient mostly slept in between care. No acute events this shift. WCTM.              Problem: Adult Inpatient Plan of Care  Goal: Optimal Comfort and Wellbeing  Outcome: Ongoing, Progressing     Problem: Adult Inpatient Plan of Care  Goal: Patient-Specific Goal (Individualization)  Outcome: Ongoing, Progressing

## 2020-06-15 NOTE — PLAN OF CARE
Poc reviewed with pt, Shelley, VSS, RA. Stoma WDL. Wound care nurse educated pt. D/c today. IV removed. Rx at bedside. D/c instructions reviewed with pt and spouse.

## 2020-06-15 NOTE — HOSPITAL COURSE
KAROLINA SPRINGER 25 y.o.female underwent: Procedure(s) (LRB):  COLECTOMY, TOTAL, LAPAROSCOPIC, ERAS high and total abdominal colectomy (N/A). Tolerated procedure well, was transferred to  then to regular floor post op. Please see the dictated operative note for further procedure details.   Hospital course was complicated by urinary retention on POD 1 requiring reinsertion of barron catheter. Catheter was removed one day later and patient was voiding without difficulty prior to discharge.  Vitals remained stable, afebrile. Labs were reviewed and electrolytes replaced appropriately.   Physical exam was appropriate for post operative state.   Was able to tolerate a regular diet as it was advanced in an appropriate surgical manner.   Was able to ambulate and void without difficulty prior to discharge. Ileostomy with appropriate amount of output.   Pain and nausea controlled with PRN medications.   Deemed suitable for discharge on 4 Days Post-Op

## 2020-06-16 ENCOUNTER — TELEPHONE (OUTPATIENT)
Dept: SURGERY | Facility: HOSPITAL | Age: 25
End: 2020-06-16

## 2020-06-16 ENCOUNTER — PATIENT OUTREACH (OUTPATIENT)
Dept: ADMINISTRATIVE | Facility: CLINIC | Age: 25
End: 2020-06-16

## 2020-06-16 LAB
FINAL PATHOLOGIC DIAGNOSIS: NORMAL
GROSS: NORMAL

## 2020-06-16 RX ORDER — CYCLOBENZAPRINE HCL 10 MG
10 TABLET ORAL 3 TIMES DAILY PRN
Qty: 30 TABLET | Refills: 1 | Status: SHIPPED | OUTPATIENT
Start: 2020-06-16 | End: 2020-06-26

## 2020-06-16 RX ORDER — OXYCODONE HYDROCHLORIDE 10 MG/1
10 TABLET ORAL EVERY 4 HOURS PRN
Qty: 30 TABLET | Refills: 0 | Status: SHIPPED | OUTPATIENT
Start: 2020-06-16 | End: 2020-06-29

## 2020-06-16 NOTE — PATIENT INSTRUCTIONS
Discharge Instructions for Ulcerative Colitis  You have been diagnosed with ulcerative colitis. Ulcerative colitis is inflammation (irritation and swelling) that happens in the rectum and colon. It is a form of inflammatory bowel disease (IBD). No one knows what causes IBD, but the symptoms can be treated. People with IBD can lead full, active lives.  Home care  Recommendations for home care include the following:  · Follow the diet that was prescribed for you in the hospital:  ¨ Avoid any foods that make your symptoms worse. These foods vary from person to person.  ¨ Keep a diary of foods that disagree with you and share this information with your healthcare provider or nutritionist.  · Take your medicines as directed. The healthcare provider may ask you to take several different types.  · Talk to your healthcare provider about the need for surgery. Some patients need to have their colon removed. This treatment has side effects. Only you and your healthcare provider can make this decision.  Follow-up care  Make a follow-up appointment as directed by our staff. Call your healthcare provider if you have any questions about your ulcerative colitis or your medicines.   When to call your healthcare provider  Call your healthcare provider immediately if you have any of the following:  · Bleeding from your rectum  · Worsening pain, new pain, or cramping in your belly  · Bloody diarrhea  · Fever of 100.4°F (38.0°C) or higher, or as directed by your healthcare provider  · Weight loss  · Nausea  · Vomiting   Date Last Reviewed: 7/1/2016  © 0662-2261 [employer]. [address] [place] [address], [place] [address]. All rights reserved. This information is not intended as a substitute for professional medical care. Always follow your healthcare professional's instructions.        Discharge Instructions for Total Abdominal Colectomy  A total abdominal colectomy is surgery to remove your colon. Your colon, also called the  large intestine, is part of your bowel. A colectomy is done to remove disease, such as cancer, polyps, and inflammatory bowel disease, and to relieve the symptoms you have been having, such as bleeding, blockage, and pain.  Activity  · Ask your friends and family to help with chores and errands while you recover.  · Walk on a regular basis. Start with short walks each day. Gradually increase the distance you walk and how often you walk.  · Dont lift anything heavier than 10 pounds for the first 6 weeks after your surgery.  · Dont drive for 2 weeks after surgery or as directed by your doctor. Dont drive while you are taking prescription pain medicine.  · Ask your doctor when you can expect to return to work. Most people are able to return to work within 4 to 6 weeks after surgery.  Other home care  · Diarrhea or loose stools are common after surgery, and can last weeks to months. If you have watery, or bloody diarrhea, call your surgeon. This may be a sign of a bowel infection or other problem.  · Follow the diet prescribed for you in the hospital. Slowly add foods until you get back to your regular diet. If a food gives you stomach or bowel problems, avoid it for a while.  · Initially, you may be on a low fiber diet. After this, adding fiber can help with the diarrhea. If it is severe, your doctor may add a medicine for the diarrhea as well.  · You may use pain medicine as directed by your provider. Discuss your best option before leaving the hospital and at your post operative visit.  · Use nutritional supplements or shakes as directed by your doctor.  · Drink at least 8 glasses of water every day, unless directed otherwise. It's very important to avoid dehydration, especially if you have an ostomy (a bag that collects stool) or diarrhea.   · Take your medicines exactly as directed. Dont skip doses.  · Shower or bathe as directed by your healthcare provider. Gently wash your incision with soap and water and pat  dry.  · Avoid tub baths until the staples in your incision have been removed.  Follow-up care  Make a follow-up appointment as directed by our staff.     When to call your healthcare provider  Call your healthcare provider right away if you have any of the following:  · Fever of 100.4°F (38°C) or higher, or as directed by your healthcare provider  · Diarrhea that lasts more than 3 days  · Nausea and vomiting that wont go away  · Pain in your abdomen that gets worse or isnt relieved by pain medicine  · Drainage or redness around your incision  · Bright red or dark black stools   Date Last Reviewed: 1/1/2017  © 3958-0832 [employer]. [address] [place] [address], [place] [address]. All rights reserved. This information is not intended as a substitute for professional medical care. Always follow your healthcare professional's instructions.

## 2020-06-16 NOTE — TELEPHONE ENCOUNTER
----- Message from Paget Bazile, MA sent at 6/16/2020  2:44 PM CDT -----    ----- Message -----  From: Yoanna Matthew LPN  Sent: 6/16/2020   1:53 PM CDT  To: [facility] Staff    Spoke with patient, states her pain is unrelieved with pain meds. Rates pain at time of call as 8 out of 10. States she last took pain med 2 hours ago. Offered patient triage nurse, declined. OOC # given. Please contact patient to discuss.    Respectfully,  Yoanna Matthew LPN  [facility]    [email or web address]       Please do not reply to this message, as this inbox is not routinely monitored.

## 2020-06-16 NOTE — PROGRESS NOTES
Spoke with patient, states her pain is unrelieved with pain meds. Rates pain at time of call as 8 out of 10. States she last took pain med 2 hours ago. Offered patient triage nurse, declined. OOC # given. Message sent to Dr Mehta's staff.

## 2020-06-16 NOTE — TELEPHONE ENCOUNTER
Called back, voice message says she might be ignoring the phone call so call back which I did and still no answer, ml on vm to call back in regards to pain, OR 6/11, refill of oxy 5 sent 6/15

## 2020-06-22 ENCOUNTER — TELEPHONE (OUTPATIENT)
Dept: SURGERY | Facility: HOSPITAL | Age: 25
End: 2020-06-22

## 2020-06-22 NOTE — TELEPHONE ENCOUNTER
----- Message from Sari Brooke sent at 6/22/2020 10:22 AM CDT -----  Regarding: pharmacy need to be called  Pt called stated the oxyCODONE (ROXICODONE) 10 mg Tab immediate release tablet, need to be authorized by the doctor or nurse, because this control medicine is being filled at a out of state pharmacy. Please call the pharmacy.  Call back [phone]. Pt also stated her stomach is super bloated. And around the stoma site is is hurting her really bad.       [id number] [place] [address] [facility] [place] [address] [address]  Phone: [phone] Fax: [phone]

## 2020-06-24 ENCOUNTER — NURSE TRIAGE (OUTPATIENT)
Dept: ADMINISTRATIVE | Facility: CLINIC | Age: 25
End: 2020-06-24

## 2020-06-24 NOTE — TELEPHONE ENCOUNTER
Attempted to contact patient on behalf of [facility]. Patent not available per caller, day 13. Triage nurse will attempt call back tomorrow.     Reason for Disposition   Message left with person in household    Protocols used: NO CONTACT OR DUPLICATE CONTACT CALL-A-[place]

## 2020-06-25 NOTE — TELEPHONE ENCOUNTER
Pt identified self by spelling first and last names and verifying . Pt denies any post-procedural symptoms such as fever, cough or SOB. Instructed pt to call [facility] back for further assistance if needed and to call 911 for all emergencies; pt voiced verbal understanding and agrees with instructions.

## 2020-06-25 NOTE — TELEPHONE ENCOUNTER
Pt identified self by spelling first and last names and verifying . Pt denies any post-procedural symptoms such as fever, cough or SOB. Pt does state that she is developing a cold but has none of the abovementioned symptoms and states that her cold isn't related to her surgery. Instructed pt to call [facility] back for further assistance if needed and to call 911 for all emergencies; pt voiced verbal understanding and agrees with instructions.  Reason for Disposition   Information only question and nurse able to answer    Protocols used: NO PROTOCOL AVAILABLE - INFORMATION ONLY-A-[place]

## 2020-06-29 RX ORDER — OXYCODONE HYDROCHLORIDE 10 MG/1
10 TABLET ORAL EVERY 4 HOURS PRN
Qty: 20 TABLET | Refills: 0 | Status: SHIPPED | OUTPATIENT
Start: 2020-06-29 | End: 2020-06-30 | Stop reason: SDUPTHER

## 2020-06-30 ENCOUNTER — TELEPHONE (OUTPATIENT)
Dept: SURGERY | Facility: CLINIC | Age: 25
End: 2020-06-30

## 2020-06-30 RX ORDER — OXYCODONE HYDROCHLORIDE 10 MG/1
10 TABLET ORAL EVERY 4 HOURS PRN
Qty: 20 TABLET | Refills: 0 | Status: ON HOLD | OUTPATIENT
Start: 2020-06-30 | End: 2020-11-09 | Stop reason: HOSPADM

## 2020-07-01 ENCOUNTER — TELEPHONE (OUTPATIENT)
Dept: SURGERY | Facility: CLINIC | Age: 25
End: 2020-07-01

## 2020-07-01 NOTE — TELEPHONE ENCOUNTER
Returned call, verbal given for orders.    ----- Message from Yue Duncan RN sent at 6/29/2020  4:34 PM CDT -----  Contact: [place] with [employer]    ----- Message -----  From: Jerman Junior  Sent: 6/29/2020   4:03 PM CDT  To: Tyson Lin Staff    Called to get a verbal order to send off the pt's ostomy supplies, state the pt is out of supplie        Please contact [place] (with [employer]): [phone]

## 2020-07-02 RX ORDER — ONDANSETRON 4 MG/1
4 TABLET, FILM COATED ORAL EVERY 6 HOURS PRN
Qty: 30 TABLET | Refills: 0 | Status: ON HOLD | OUTPATIENT
Start: 2020-07-02 | End: 2020-11-09

## 2020-07-06 NOTE — PROGRESS NOTES
"CRS Office Visit Follow-up    SUBJECTIVE:     History of Present Illness:  Patient is a 25 y.o. female who presents following laparoscopic total colectomy for medically refractory UC on 6/11/2020. Their post-operative course was uncomplicated. There are no new complaints today.   Doing well overall with her ileostomy.  Had some lateral skin irritation last week, which is now resolved.  States that output has been a little bit watery, but less than 1 L per day.  Takes Imodium on an  as-needed basis.    Final pathology:  Total abdominal colectomy:   - Moderate active chronic colitis   - No evidence of malignancy or dysplasia   - Changes including crypt abscesses, cryptitis, crypt architectural distortion, basal lymphoplasmacytosis and increased inflammation within the lamina propria   - Terminal ileum with no diagnostic changes     Review of Systems:  Review of Systems   All other systems reviewed and are negative.      OBJECTIVE:     Vital Signs (Most Recent)  /68 (BP Location: Left arm, Patient Position: Sitting, BP Method: Large (Automatic))   Pulse (!) 115   Ht 5' 5" (1.651 m)   Wt 59.9 kg (132 lb 0.9 oz)   BMI 21.98 kg/m²     Physical Exam:  General: White female in no distress   Neuro: Alert and oriented x 4.  Moves all extremities.     HEENT: No icterus.  Trachea midline  Respiratory: Respirations are even and unlabored  Cardiac: Regular rate  Abdomen:   Soft, nontender, nondistended.  Ileostomy pink and healthy with healthy surrounding skin.  Extremities: Warm dry and intact  Skin: No rashes      ASSESSMENT/PLAN:       25-year-old female 3 weeks status post laparoscopic total abdominal colectomy for ulcerative colitis, doing well.  Discussed options for thickening ileostomy output including addition of Metamucil versus more regular use of Imodium.  Also discussed the importance of measuring the volume when it is liquidy to avoid dehydration.  She is finishing up her steroid taper.  We will plan to " see her back in clinic in 3 months.  She states that she and her significant other have discussed considering waiting  1-2 years for J-pouch surgery, as they may want to conceive next year.    Delia Mehta MD  Staff Surgeon, Colon and Rectal Surgery  [facility]

## 2020-07-07 ENCOUNTER — OFFICE VISIT (OUTPATIENT)
Dept: SURGERY | Facility: CLINIC | Age: 25
End: 2020-07-07
Attending: COLON & RECTAL SURGERY
Payer: MEDICAID

## 2020-07-07 ENCOUNTER — OFFICE VISIT (OUTPATIENT)
Dept: WOUND CARE | Facility: CLINIC | Age: 25
End: 2020-07-07
Payer: MEDICAID

## 2020-07-07 VITALS
HEIGHT: 65 IN | SYSTOLIC BLOOD PRESSURE: 103 MMHG | DIASTOLIC BLOOD PRESSURE: 68 MMHG | BODY MASS INDEX: 22 KG/M2 | SYSTOLIC BLOOD PRESSURE: 103 MMHG | HEIGHT: 65 IN | BODY MASS INDEX: 22 KG/M2 | HEART RATE: 115 BPM | WEIGHT: 132.06 LBS | DIASTOLIC BLOOD PRESSURE: 68 MMHG | WEIGHT: 132.06 LBS | HEART RATE: 115 BPM

## 2020-07-07 DIAGNOSIS — Z43.2 ATTENTION TO ILEOSTOMY: Primary | ICD-10-CM

## 2020-07-07 DIAGNOSIS — Z71.89 ENCOUNTER FOR OSTOMY CARE EDUCATION: ICD-10-CM

## 2020-07-07 DIAGNOSIS — K51.011 ULCERATIVE PANCOLITIS WITH RECTAL BLEEDING: Primary | ICD-10-CM

## 2020-07-07 DIAGNOSIS — Z98.890 POST-OPERATIVE STATE: ICD-10-CM

## 2020-07-07 PROCEDURE — 99024 POSTOP FOLLOW-UP VISIT: CPT | Mod: ,,, | Performed by: COLON & RECTAL SURGERY

## 2020-07-07 PROCEDURE — 99999 PR PBB SHADOW E&M-EST. PATIENT-LVL IV: ICD-10-PCS | Mod: PBBFAC,,, | Performed by: COLON & RECTAL SURGERY

## 2020-07-07 PROCEDURE — 99213 OFFICE O/P EST LOW 20 MIN: CPT | Mod: PBBFAC | Performed by: CLINICAL NURSE SPECIALIST

## 2020-07-07 PROCEDURE — 99999 PR PBB SHADOW E&M-EST. PATIENT-LVL III: ICD-10-PCS | Mod: PBBFAC,,, | Performed by: CLINICAL NURSE SPECIALIST

## 2020-07-07 PROCEDURE — 99999 PR PBB SHADOW E&M-EST. PATIENT-LVL III: CPT | Mod: PBBFAC,,, | Performed by: CLINICAL NURSE SPECIALIST

## 2020-07-07 PROCEDURE — 99024 PR POST-OP FOLLOW-UP VISIT: ICD-10-PCS | Mod: ,,, | Performed by: COLON & RECTAL SURGERY

## 2020-07-07 PROCEDURE — 99214 OFFICE O/P EST MOD 30 MIN: CPT | Mod: PBBFAC,27 | Performed by: COLON & RECTAL SURGERY

## 2020-07-07 PROCEDURE — 99024 PR POST-OP FOLLOW-UP VISIT: ICD-10-PCS | Mod: ,,, | Performed by: CLINICAL NURSE SPECIALIST

## 2020-07-07 PROCEDURE — 99024 POSTOP FOLLOW-UP VISIT: CPT | Mod: ,,, | Performed by: CLINICAL NURSE SPECIALIST

## 2020-07-07 PROCEDURE — 99999 PR PBB SHADOW E&M-EST. PATIENT-LVL IV: CPT | Mod: PBBFAC,,, | Performed by: COLON & RECTAL SURGERY

## 2020-07-07 NOTE — PATIENT INSTRUCTIONS
(1) It is ok to use Imodium daily if you need, just make sure that you do not get too constipated.  Hold the Imodium if your ostomy output slows down too much    (2) Try adding a fiber supplement. Start daily fiber.  Take 1 tsp of fiber powder (psyllium or other sugar-free powder).  Mix in 8 oz of water.  Take x 3-5 days.  Then, increase fiber by 1 tsp every 3-5 days until stool is easy to pass.  Stop and continue at that dose.   Do not exceed 6 tsps/day. GOAL:  More well-formed stool (one continuous well-formed piece vs. Pellets) and minimize straining with initiation.    (3) If your ostomy output gets too liquidy, measure the volume for 24hrs to make sure it is not more than 1 liter.

## 2020-07-07 NOTE — PROGRESS NOTES
"This patient is known to me and is here in clinic today for first post op evaluation related to ileostomy. Surgery done 6/11 by Dr. Mehta. The patient reports no  problems with  new ostomy. The patient is not receiving home health care.   Pain level today is reported as  0.    The ileostomy is 1" steven medium budded stoma.  The patient is currently  wearing a 2piece pouching system by [employer]. But also likes convatec  Average wear time is 3 days.   Peristomal skin is clear    There is no  mucocutaneous separation.  Pt is coping well with the new ostomy.  SUPPLIES/DME: [employer] and already got first order        SPECIAL NEEDS:  Pt counseled on skin care, nutrition, hydration as well as  how to order ostomy supplies.  I spent greater than 50% of this 15 minute visit in face to face counseling.    "

## 2020-07-14 ENCOUNTER — TELEPHONE (OUTPATIENT)
Dept: SURGERY | Facility: CLINIC | Age: 25
End: 2020-07-14

## 2020-07-14 ENCOUNTER — PATIENT MESSAGE (OUTPATIENT)
Dept: SURGERY | Facility: CLINIC | Age: 25
End: 2020-07-14

## 2020-07-14 DIAGNOSIS — R11.2 NAUSEA AND VOMITING, INTRACTABILITY OF VOMITING NOT SPECIFIED, UNSPECIFIED VOMITING TYPE: ICD-10-CM

## 2020-07-15 ENCOUNTER — PATIENT MESSAGE (OUTPATIENT)
Dept: SURGERY | Facility: CLINIC | Age: 25
End: 2020-07-15

## 2020-07-16 ENCOUNTER — PATIENT MESSAGE (OUTPATIENT)
Dept: SURGERY | Facility: CLINIC | Age: 25
End: 2020-07-16

## 2020-07-16 ENCOUNTER — TELEPHONE (OUTPATIENT)
Dept: SURGERY | Facility: CLINIC | Age: 25
End: 2020-07-16

## 2020-07-16 NOTE — TELEPHONE ENCOUNTER
Called patient to get clarification on the information she sent in her message.  Pt could not afford the foam, so in lieu the pharmacy recommended the recticare.  She is having blood when she wipes going to the bathroom.  She is not experienceing any cramping, nausea, fever or other symptoms.  She is coming for a CT on 7/23.  I will send her message to Dr. Mehta for review and wait for her advisement.

## 2020-07-22 ENCOUNTER — TELEPHONE (OUTPATIENT)
Dept: SURGERY | Facility: CLINIC | Age: 25
End: 2020-07-22

## 2020-07-23 ENCOUNTER — HOSPITAL ENCOUNTER (OUTPATIENT)
Dept: RADIOLOGY | Facility: HOSPITAL | Age: 25
Discharge: HOME | End: 2020-07-23
Attending: COLON & RECTAL SURGERY
Payer: MEDICAID

## 2020-07-23 ENCOUNTER — TELEPHONE (OUTPATIENT)
Dept: SURGERY | Facility: CLINIC | Age: 25
End: 2020-07-23

## 2020-07-23 DIAGNOSIS — R11.2 NAUSEA AND VOMITING, INTRACTABILITY OF VOMITING NOT SPECIFIED, UNSPECIFIED VOMITING TYPE: ICD-10-CM

## 2020-07-23 PROCEDURE — 25500020 PHARM REV CODE 255: Performed by: COLON & RECTAL SURGERY

## 2020-07-23 PROCEDURE — 74177 CT ABDOMEN PELVIS WITH CONTRAST: ICD-10-PCS | Mod: 26,,, | Performed by: RADIOLOGY

## 2020-07-23 PROCEDURE — 74177 CT ABD & PELVIS W/CONTRAST: CPT | Mod: TC

## 2020-07-23 PROCEDURE — 74177 CT ABD & PELVIS W/CONTRAST: CPT | Mod: 26,,, | Performed by: RADIOLOGY

## 2020-07-23 RX ADMIN — IOHEXOL 15 ML: 350 INJECTION, SOLUTION INTRAVENOUS at 05:07

## 2020-07-23 RX ADMIN — IOHEXOL 15 ML: 350 INJECTION, SOLUTION INTRAVENOUS at 04:07

## 2020-07-23 RX ADMIN — IOHEXOL 75 ML: 350 INJECTION, SOLUTION INTRAVENOUS at 05:07

## 2020-07-24 ENCOUNTER — TELEPHONE (OUTPATIENT)
Dept: SURGERY | Facility: CLINIC | Age: 25
End: 2020-07-24

## 2020-07-24 DIAGNOSIS — K51.011 ULCERATIVE PANCOLITIS WITH RECTAL BLEEDING: Primary | ICD-10-CM

## 2020-07-24 RX ORDER — MESALAMINE 1000 MG/1
1000 SUPPOSITORY RECTAL NIGHTLY
Qty: 30 SUPPOSITORY | Refills: 11 | Status: ON HOLD | OUTPATIENT
Start: 2020-07-24 | End: 2020-11-09

## 2020-07-24 NOTE — TELEPHONE ENCOUNTER
Spoke with patient. Informed her that I emailed her a copy of her lab order and suggested that she print it out and bring it when she goes to have the lab work done. I have also faxed a copy to [employer] in [place] at [phone].

## 2020-08-31 ENCOUNTER — TELEPHONE (OUTPATIENT)
Dept: SURGERY | Facility: CLINIC | Age: 25
End: 2020-08-31

## 2020-08-31 NOTE — TELEPHONE ENCOUNTER
----- Message from Delia Mehta MD sent at 8/31/2020  2:10 PM CDT -----  She does not need to come in person, the photos look normal and she had a CT late last month which was also normal.  She is welcome to come in person if she wants me to evaluate it further  ----- Message -----  From: Paget Bazile, MA  Sent: 8/31/2020   1:45 PM CDT  To: Delia Mehta MD    This pt is complaining about puss leaking out of her belly button.  Says it has been going on for two weeks now.  She says there is an odor but she is not running fever or having any other symptoms.  She has a follow up appt in October to come to clinic.  I have asked her to take some pics and send it, and those are in her message.    Please advise if you would like her to come in, she drives 2 1/2 hours to get here.    Paget Bazile

## 2020-08-31 NOTE — TELEPHONE ENCOUNTER
Spoke with pt about puss coming out of her belly button.  She said the puss smells but she is not running any kind of fever and does not feel any pain at this time.  She states that this has been going on for two weeks now.  She lives almost 3 hours away and has a follow up appt in October.  I informed the pt I would let Dr. Mehta know and see what she advises.

## 2020-10-19 ENCOUNTER — OFFICE VISIT (OUTPATIENT)
Dept: SURGERY | Facility: CLINIC | Age: 25
End: 2020-10-19
Attending: COLON & RECTAL SURGERY
Payer: MEDICAID

## 2020-10-19 VITALS
HEIGHT: 65 IN | HEART RATE: 100 BPM | WEIGHT: 125.44 LBS | SYSTOLIC BLOOD PRESSURE: 110 MMHG | DIASTOLIC BLOOD PRESSURE: 64 MMHG | BODY MASS INDEX: 20.9 KG/M2

## 2020-10-19 DIAGNOSIS — K51.011 ULCERATIVE PANCOLITIS WITH RECTAL BLEEDING: Primary | ICD-10-CM

## 2020-10-19 DIAGNOSIS — Z01.818 PREOP TESTING: ICD-10-CM

## 2020-10-19 PROCEDURE — 99214 OFFICE O/P EST MOD 30 MIN: CPT | Mod: S$PBB,,, | Performed by: COLON & RECTAL SURGERY

## 2020-10-19 PROCEDURE — 99214 OFFICE O/P EST MOD 30 MIN: CPT | Mod: PBBFAC | Performed by: COLON & RECTAL SURGERY

## 2020-10-19 PROCEDURE — 99214 PR OFFICE/OUTPT VISIT, EST, LEVL IV, 30-39 MIN: ICD-10-PCS | Mod: S$PBB,,, | Performed by: COLON & RECTAL SURGERY

## 2020-10-19 PROCEDURE — 99999 PR PBB SHADOW E&M-EST. PATIENT-LVL IV: CPT | Mod: PBBFAC,,, | Performed by: COLON & RECTAL SURGERY

## 2020-10-19 PROCEDURE — 99999 PR PBB SHADOW E&M-EST. PATIENT-LVL IV: ICD-10-PCS | Mod: PBBFAC,,, | Performed by: COLON & RECTAL SURGERY

## 2020-10-19 NOTE — H&P (VIEW-ONLY)
"CRS Office Visit Follow-up    SUBJECTIVE:     History of Present Illness:  Patient is a 25 y.o. female who presents following laparoscopic total colectomy for medically refractory UC on 6/11/2020. Their post-operative course was uncomplicated.   Overall she has done very well with her ileostomy.  Takes Imodium every 2-3 days as needed.  Had some skin reaction to the appliance which has improved with some changes in how she applies it and switching to a different company.  Occasionally has discharge from her rectum, which she knows is normal.    She comes in today to discuss planning for J pouch.  She is interested in proceeding with this.  She has done a large amount of independent research to educate herself on J pouch function and is currently involved in several support groups.  Is off of steroids.  Feels that she is almost completely back to her normal functional status.    Review of Systems:  Review of Systems   All other systems reviewed and are negative.      OBJECTIVE:     Vital Signs (Most Recent)  /64 (BP Location: Left arm, Patient Position: Sitting, BP Method: Large (Automatic))   Pulse 100   Ht 5' 5" (1.651 m)   Wt 56.9 kg (125 lb 7.1 oz)   BMI 20.87 kg/m²     Physical Exam:  General: White female in no distress   Neuro: Alert and oriented x 4.  Moves all extremities.     HEENT: No icterus.  Trachea midline  Respiratory: Respirations are even and unlabored  Cardiac: Regular rate  Abdomen:   Soft, nontender, nondistended.  Ileostomy pink and healthy  Extremities: Warm dry and intact  Skin: No rashes      ASSESSMENT/PLAN:       25-year-old female status post laparoscopic total abdominal colectomy for ulcerative colitis 6/11/2020, doing well.  Having had some time to recover from surgery and educating herself within support groups, she would like to go ahead and proceed with J-pouch procedure.  We discussed anticipated bowel function with the J pouch and long-term risk of changes infertility, " pouchitis, and development of Crohn's disease within the pouch.  Discussed that we would plan to do this is a 3 stage procedure, with loop ileostomy at the time of J-pouch formation.  We discussed risks of a laparoscopic completion proctectomy with J-pouch and diverting loop ileostomy including anastomotic leak, pelvic sepsis, damage to vagina with pouch vaginal fistula, long-term changes in sexual function, changes infertility associated with scar tissue, need for  section with future deliveries, surgical site infection, blood clots, bleeding, damage to surrounding structures including bowel and ureter.  Discussed anticipated postoperative recovery.  She did signed her surgical consent today in clinic.  Has had a CT scan postoperatively which was normal.  Will plan for preop labs.  Asked if she had any additional questions, none at this time.  Would like to schedule after .    Delia Mehta MD  Staff Surgeon, Colon and Rectal Surgery  [facility]

## 2020-11-02 ENCOUNTER — LAB VISIT (OUTPATIENT)
Dept: SURGERY | Facility: CLINIC | Age: 25
End: 2020-11-02
Attending: COLON & RECTAL SURGERY
Payer: MEDICAID

## 2020-11-02 DIAGNOSIS — Z01.818 PREOP TESTING: ICD-10-CM

## 2020-11-02 LAB — SARS-COV-2 RNA RESP QL NAA+PROBE: NOT DETECTED

## 2020-11-02 PROCEDURE — U0003 INFECTIOUS AGENT DETECTION BY NUCLEIC ACID (DNA OR RNA); SEVERE ACUTE RESPIRATORY SYNDROME CORONAVIRUS 2 (SARS-COV-2) (CORONAVIRUS DISEASE [COVID-19]), AMPLIFIED PROBE TECHNIQUE, MAKING USE OF HIGH THROUGHPUT TECHNOLOGIES AS DESCRIBED BY CMS-2020-01-R: HCPCS

## 2020-11-04 ENCOUNTER — TELEPHONE (OUTPATIENT)
Dept: SURGERY | Facility: CLINIC | Age: 25
End: 2020-11-04

## 2020-11-04 NOTE — TELEPHONE ENCOUNTER
----- Message from Hari Chiang sent at 11/4/2020  1:58 PM CST -----  Contact: pt  Please call pt at [phone]    Patient is calling for the procedure arrival time    Thank you

## 2020-11-04 NOTE — PRE-PROCEDURE INSTRUCTIONS
PREOP INSTRUCTIONS:    NPO INSTRUCTIONS GIVEN PER CRS DEPT.    Shower instructions as well as directions to the Day of Surgery Center were given.Patient encouraged to wear loose fitting,comfortable clothing.Medication instructions for pm prior to and am of procedure reviewed.Instructed patient to avoid taking vitamins,supplements,aspirin and ibuprofen the morning of surgery. Patient stated an understanding.Patient instructed to take temperature the night before surgery as well as the morning of surgery and to notify [facility] at [phone] if it is 100.4 or above.Patient also informed of the current visitor policy and advised patient that one visitor may accompany them into the hospital and wait (socially distanced) .When they enter the hospital both patient and visitor will have their temperature checked,provided a mask and provided assistance to their destination.     Inpatients are allowed 1 visitor/day from 8 am until 6 pm.          Patient denies any side effects or issues with anesthesia or sedation.

## 2020-11-05 ENCOUNTER — ANESTHESIA (OUTPATIENT)
Dept: SURGERY | Facility: HOSPITAL | Age: 25
DRG: 331 | End: 2020-11-05
Payer: MEDICAID

## 2020-11-05 ENCOUNTER — ANESTHESIA EVENT (OUTPATIENT)
Dept: SURGERY | Facility: HOSPITAL | Age: 25
DRG: 331 | End: 2020-11-05
Payer: MEDICAID

## 2020-11-05 ENCOUNTER — HOSPITAL ENCOUNTER (INPATIENT)
Facility: HOSPITAL | Age: 25
LOS: 4 days | Discharge: HOME | DRG: 331 | End: 2020-11-09
Attending: COLON & RECTAL SURGERY | Admitting: COLON & RECTAL SURGERY
Payer: MEDICAID

## 2020-11-05 DIAGNOSIS — K51.90 ULCERATIVE COLITIS: ICD-10-CM

## 2020-11-05 LAB
B-HCG UR QL: NEGATIVE
CTP QC/QA: YES

## 2020-11-05 PROCEDURE — D9220A PRA ANESTHESIA: ICD-10-PCS | Mod: ANES,,, | Performed by: ANESTHESIOLOGY

## 2020-11-05 PROCEDURE — 63600175 PHARM REV CODE 636 W HCPCS: Performed by: ANESTHESIOLOGY

## 2020-11-05 PROCEDURE — 71000016 HC POSTOP RECOV ADDL HR: Performed by: COLON & RECTAL SURGERY

## 2020-11-05 PROCEDURE — 45397 LAP REMOVE RECTUM W/POUCH: CPT | Mod: ,,, | Performed by: COLON & RECTAL SURGERY

## 2020-11-05 PROCEDURE — D9220A PRA ANESTHESIA: Mod: CRNA,,, | Performed by: NURSE ANESTHETIST, CERTIFIED REGISTERED

## 2020-11-05 PROCEDURE — 25000003 PHARM REV CODE 250: Performed by: NURSE ANESTHETIST, CERTIFIED REGISTERED

## 2020-11-05 PROCEDURE — S0030 INJECTION, METRONIDAZOLE: HCPCS | Performed by: STUDENT IN AN ORGANIZED HEALTH CARE EDUCATION/TRAINING PROGRAM

## 2020-11-05 PROCEDURE — 27100025 HC TUBING, SET FLUID WARMER: Performed by: ANESTHESIOLOGY

## 2020-11-05 PROCEDURE — C1765 ADHESION BARRIER: HCPCS | Performed by: COLON & RECTAL SURGERY

## 2020-11-05 PROCEDURE — D9220A PRA ANESTHESIA: Mod: ANES,,, | Performed by: ANESTHESIOLOGY

## 2020-11-05 PROCEDURE — 45397 PR LAP, SURG PROCTECTOMY W J-POUCH: ICD-10-PCS | Mod: ,,, | Performed by: COLON & RECTAL SURGERY

## 2020-11-05 PROCEDURE — 37000009 HC ANESTHESIA EA ADD 15 MINS: Performed by: COLON & RECTAL SURGERY

## 2020-11-05 PROCEDURE — 27201423 OPTIME MED/SURG SUP & DEVICES STERILE SUPPLY: Performed by: COLON & RECTAL SURGERY

## 2020-11-05 PROCEDURE — 81025 URINE PREGNANCY TEST: CPT | Performed by: COLON & RECTAL SURGERY

## 2020-11-05 PROCEDURE — C1729 CATH, DRAINAGE: HCPCS | Performed by: COLON & RECTAL SURGERY

## 2020-11-05 PROCEDURE — 71000033 HC RECOVERY, INTIAL HOUR: Performed by: COLON & RECTAL SURGERY

## 2020-11-05 PROCEDURE — 12000002 HC ACUTE/MED SURGE SEMI-PRIVATE ROOM

## 2020-11-05 PROCEDURE — 71000015 HC POSTOP RECOV 1ST HR: Performed by: COLON & RECTAL SURGERY

## 2020-11-05 PROCEDURE — 71000039 HC RECOVERY, EACH ADD'L HOUR: Performed by: COLON & RECTAL SURGERY

## 2020-11-05 PROCEDURE — 63600175 PHARM REV CODE 636 W HCPCS: Performed by: NURSE ANESTHETIST, CERTIFIED REGISTERED

## 2020-11-05 PROCEDURE — 25000003 PHARM REV CODE 250: Performed by: STUDENT IN AN ORGANIZED HEALTH CARE EDUCATION/TRAINING PROGRAM

## 2020-11-05 PROCEDURE — 88307 PR  SURG PATH,LEVEL V: ICD-10-PCS | Mod: 26,,, | Performed by: PATHOLOGY

## 2020-11-05 PROCEDURE — 88307 TISSUE EXAM BY PATHOLOGIST: CPT | Mod: 26,,, | Performed by: PATHOLOGY

## 2020-11-05 PROCEDURE — 63600175 PHARM REV CODE 636 W HCPCS: Performed by: STUDENT IN AN ORGANIZED HEALTH CARE EDUCATION/TRAINING PROGRAM

## 2020-11-05 PROCEDURE — 36000711: Performed by: COLON & RECTAL SURGERY

## 2020-11-05 PROCEDURE — 88307 TISSUE EXAM BY PATHOLOGIST: CPT | Mod: 59 | Performed by: PATHOLOGY

## 2020-11-05 PROCEDURE — D9220A PRA ANESTHESIA: ICD-10-PCS | Mod: CRNA,,, | Performed by: NURSE ANESTHETIST, CERTIFIED REGISTERED

## 2020-11-05 PROCEDURE — 25000003 PHARM REV CODE 250: Performed by: COLON & RECTAL SURGERY

## 2020-11-05 PROCEDURE — 37000008 HC ANESTHESIA 1ST 15 MINUTES: Performed by: COLON & RECTAL SURGERY

## 2020-11-05 PROCEDURE — 36000710: Performed by: COLON & RECTAL SURGERY

## 2020-11-05 PROCEDURE — 25000003 PHARM REV CODE 250: Performed by: INTERNAL MEDICINE

## 2020-11-05 DEVICE — BARRIER SEPRAFILM ADHESION: Type: IMPLANTABLE DEVICE | Site: ABDOMEN | Status: FUNCTIONAL

## 2020-11-05 RX ORDER — ONDANSETRON 2 MG/ML
4 INJECTION INTRAMUSCULAR; INTRAVENOUS EVERY 12 HOURS PRN
Status: DISCONTINUED | OUTPATIENT
Start: 2020-11-05 | End: 2020-11-09 | Stop reason: HOSPADM

## 2020-11-05 RX ORDER — GABAPENTIN 300 MG/1
300 CAPSULE ORAL 3 TIMES DAILY
Status: DISCONTINUED | OUTPATIENT
Start: 2020-11-05 | End: 2020-11-09 | Stop reason: HOSPADM

## 2020-11-05 RX ORDER — KETAMINE HCL IN 0.9 % NACL 50 MG/5 ML
SYRINGE (ML) INTRAVENOUS
Status: DISCONTINUED | OUTPATIENT
Start: 2020-11-05 | End: 2020-11-05

## 2020-11-05 RX ORDER — HALOPERIDOL 5 MG/ML
0.5 INJECTION INTRAMUSCULAR
Status: DISCONTINUED | OUTPATIENT
Start: 2020-11-05 | End: 2020-11-06 | Stop reason: HOSPADM

## 2020-11-05 RX ORDER — ACETAMINOPHEN 10 MG/ML
1000 INJECTION, SOLUTION INTRAVENOUS EVERY 8 HOURS
Status: COMPLETED | OUTPATIENT
Start: 2020-11-05 | End: 2020-11-06

## 2020-11-05 RX ORDER — ALVIMOPAN 12 MG/1
12 CAPSULE ORAL
Status: COMPLETED | OUTPATIENT
Start: 2020-11-05 | End: 2020-11-05

## 2020-11-05 RX ORDER — ROCURONIUM BROMIDE 10 MG/ML
INJECTION, SOLUTION INTRAVENOUS
Status: DISCONTINUED | OUTPATIENT
Start: 2020-11-05 | End: 2020-11-05

## 2020-11-05 RX ORDER — DEXAMETHASONE SODIUM PHOSPHATE 4 MG/ML
INJECTION, SOLUTION INTRA-ARTICULAR; INTRALESIONAL; INTRAMUSCULAR; INTRAVENOUS; SOFT TISSUE
Status: DISCONTINUED | OUTPATIENT
Start: 2020-11-05 | End: 2020-11-05

## 2020-11-05 RX ORDER — ACETAMINOPHEN 500 MG
1000 TABLET ORAL EVERY 8 HOURS
Status: DISCONTINUED | OUTPATIENT
Start: 2020-11-06 | End: 2020-11-09 | Stop reason: HOSPADM

## 2020-11-05 RX ORDER — SCOLOPAMINE TRANSDERMAL SYSTEM 1 MG/1
1 PATCH, EXTENDED RELEASE TRANSDERMAL
Status: DISCONTINUED | OUTPATIENT
Start: 2020-11-05 | End: 2020-11-05

## 2020-11-05 RX ORDER — EPHEDRINE SULFATE 50 MG/ML
INJECTION, SOLUTION INTRAVENOUS
Status: DISCONTINUED | OUTPATIENT
Start: 2020-11-05 | End: 2020-11-05

## 2020-11-05 RX ORDER — PHENYLEPHRINE HYDROCHLORIDE 10 MG/ML
INJECTION INTRAVENOUS
Status: DISCONTINUED | OUTPATIENT
Start: 2020-11-05 | End: 2020-11-05

## 2020-11-05 RX ORDER — MUPIROCIN 20 MG/G
OINTMENT TOPICAL
Status: DISCONTINUED | OUTPATIENT
Start: 2020-11-05 | End: 2020-11-05

## 2020-11-05 RX ORDER — NEOSTIGMINE METHYLSULFATE 1 MG/ML
INJECTION, SOLUTION INTRAVENOUS
Status: DISCONTINUED | OUTPATIENT
Start: 2020-11-05 | End: 2020-11-05

## 2020-11-05 RX ORDER — HYDROMORPHONE HYDROCHLORIDE 1 MG/ML
INJECTION, SOLUTION INTRAMUSCULAR; INTRAVENOUS; SUBCUTANEOUS
Status: DISPENSED
Start: 2020-11-05 | End: 2020-11-06

## 2020-11-05 RX ORDER — ENOXAPARIN SODIUM 100 MG/ML
40 INJECTION SUBCUTANEOUS EVERY 24 HOURS
Status: DISCONTINUED | OUTPATIENT
Start: 2020-11-06 | End: 2020-11-09 | Stop reason: HOSPADM

## 2020-11-05 RX ORDER — DEXMEDETOMIDINE HYDROCHLORIDE 100 UG/ML
INJECTION, SOLUTION INTRAVENOUS
Status: DISCONTINUED | OUTPATIENT
Start: 2020-11-05 | End: 2020-11-05

## 2020-11-05 RX ORDER — ACETAMINOPHEN 10 MG/ML
INJECTION, SOLUTION INTRAVENOUS
Status: DISCONTINUED | OUTPATIENT
Start: 2020-11-05 | End: 2020-11-05

## 2020-11-05 RX ORDER — ONDANSETRON 2 MG/ML
4 INJECTION INTRAMUSCULAR; INTRAVENOUS DAILY PRN
Status: CANCELLED | OUTPATIENT
Start: 2020-11-05

## 2020-11-05 RX ORDER — OXYCODONE HYDROCHLORIDE 5 MG/1
5 TABLET ORAL EVERY 4 HOURS PRN
Status: DISCONTINUED | OUTPATIENT
Start: 2020-11-05 | End: 2020-11-09 | Stop reason: HOSPADM

## 2020-11-05 RX ORDER — LIDOCAINE HCL/PF 100 MG/5ML
SYRINGE (ML) INTRAVENOUS
Status: DISCONTINUED | OUTPATIENT
Start: 2020-11-05 | End: 2020-11-05

## 2020-11-05 RX ORDER — PROPOFOL 10 MG/ML
VIAL (ML) INTRAVENOUS
Status: DISCONTINUED | OUTPATIENT
Start: 2020-11-05 | End: 2020-11-05

## 2020-11-05 RX ORDER — SODIUM CHLORIDE 9 MG/ML
INJECTION, SOLUTION INTRAVENOUS CONTINUOUS
Status: DISCONTINUED | OUTPATIENT
Start: 2020-11-05 | End: 2020-11-05

## 2020-11-05 RX ORDER — HYDROMORPHONE HYDROCHLORIDE 2 MG/ML
INJECTION, SOLUTION INTRAMUSCULAR; INTRAVENOUS; SUBCUTANEOUS
Status: DISCONTINUED | OUTPATIENT
Start: 2020-11-05 | End: 2020-11-05

## 2020-11-05 RX ORDER — FENTANYL CITRATE 50 UG/ML
INJECTION, SOLUTION INTRAMUSCULAR; INTRAVENOUS
Status: DISCONTINUED | OUTPATIENT
Start: 2020-11-05 | End: 2020-11-05

## 2020-11-05 RX ORDER — SCOLOPAMINE TRANSDERMAL SYSTEM 1 MG/1
1 PATCH, EXTENDED RELEASE TRANSDERMAL
Status: ACTIVE | OUTPATIENT
Start: 2020-11-05 | End: 2020-11-06

## 2020-11-05 RX ORDER — ONDANSETRON HYDROCHLORIDE 2 MG/ML
INJECTION, SOLUTION INTRAMUSCULAR; INTRAVENOUS
Status: DISCONTINUED | OUTPATIENT
Start: 2020-11-05 | End: 2020-11-05

## 2020-11-05 RX ORDER — FENTANYL CITRATE 50 UG/ML
25 INJECTION, SOLUTION INTRAMUSCULAR; INTRAVENOUS EVERY 5 MIN PRN
Status: COMPLETED | OUTPATIENT
Start: 2020-11-05 | End: 2020-11-05

## 2020-11-05 RX ORDER — MUPIROCIN 20 MG/G
OINTMENT TOPICAL 2 TIMES DAILY
Status: DISCONTINUED | OUTPATIENT
Start: 2020-11-05 | End: 2020-11-09 | Stop reason: HOSPADM

## 2020-11-05 RX ORDER — TRAMADOL HYDROCHLORIDE 50 MG/1
50 TABLET ORAL EVERY 6 HOURS PRN
Status: DISCONTINUED | OUTPATIENT
Start: 2020-11-05 | End: 2020-11-09 | Stop reason: HOSPADM

## 2020-11-05 RX ORDER — CELECOXIB 200 MG/1
400 CAPSULE ORAL
Status: COMPLETED | OUTPATIENT
Start: 2020-11-05 | End: 2020-11-05

## 2020-11-05 RX ORDER — HEPARIN SODIUM 5000 [USP'U]/ML
5000 INJECTION, SOLUTION INTRAVENOUS; SUBCUTANEOUS
Status: COMPLETED | OUTPATIENT
Start: 2020-11-05 | End: 2020-11-05

## 2020-11-05 RX ORDER — METRONIDAZOLE 500 MG/100ML
500 INJECTION, SOLUTION INTRAVENOUS
Status: COMPLETED | OUTPATIENT
Start: 2020-11-05 | End: 2020-11-05

## 2020-11-05 RX ORDER — IBUPROFEN 400 MG/1
800 TABLET ORAL EVERY 8 HOURS
Status: DISCONTINUED | OUTPATIENT
Start: 2020-11-06 | End: 2020-11-09 | Stop reason: HOSPADM

## 2020-11-05 RX ORDER — SODIUM CHLORIDE 0.9 % (FLUSH) 0.9 %
10 SYRINGE (ML) INJECTION
Status: DISCONTINUED | OUTPATIENT
Start: 2020-11-05 | End: 2020-11-09 | Stop reason: HOSPADM

## 2020-11-05 RX ORDER — GABAPENTIN 100 MG/1
200 CAPSULE ORAL
Status: COMPLETED | OUTPATIENT
Start: 2020-11-05 | End: 2020-11-05

## 2020-11-05 RX ORDER — BUPIVACAINE HYDROCHLORIDE 2.5 MG/ML
INJECTION, SOLUTION EPIDURAL; INFILTRATION; INTRACAUDAL
Status: DISCONTINUED | OUTPATIENT
Start: 2020-11-05 | End: 2020-11-05 | Stop reason: HOSPADM

## 2020-11-05 RX ORDER — ACETAMINOPHEN 500 MG
1000 TABLET ORAL
Status: COMPLETED | OUTPATIENT
Start: 2020-11-05 | End: 2020-11-05

## 2020-11-05 RX ORDER — SODIUM CHLORIDE 0.9 % (FLUSH) 0.9 %
10 SYRINGE (ML) INJECTION
Status: DISCONTINUED | OUTPATIENT
Start: 2020-11-05 | End: 2020-11-06 | Stop reason: HOSPADM

## 2020-11-05 RX ORDER — MIDAZOLAM HYDROCHLORIDE 1 MG/ML
INJECTION INTRAMUSCULAR; INTRAVENOUS
Status: DISCONTINUED | OUTPATIENT
Start: 2020-11-05 | End: 2020-11-05

## 2020-11-05 RX ORDER — OXYCODONE HYDROCHLORIDE 10 MG/1
10 TABLET ORAL EVERY 4 HOURS PRN
Status: DISCONTINUED | OUTPATIENT
Start: 2020-11-05 | End: 2020-11-09 | Stop reason: HOSPADM

## 2020-11-05 RX ORDER — SODIUM CHLORIDE, SODIUM LACTATE, POTASSIUM CHLORIDE, CALCIUM CHLORIDE 600; 310; 30; 20 MG/100ML; MG/100ML; MG/100ML; MG/100ML
INJECTION, SOLUTION INTRAVENOUS CONTINUOUS
Status: DISCONTINUED | OUTPATIENT
Start: 2020-11-05 | End: 2020-11-07

## 2020-11-05 RX ORDER — LIDOCAINE HYDROCHLORIDE ANHYDROUS AND DEXTROSE MONOHYDRATE .8; 5 G/100ML; G/100ML
INJECTION, SOLUTION INTRAVENOUS CONTINUOUS PRN
Status: DISCONTINUED | OUTPATIENT
Start: 2020-11-05 | End: 2020-11-05

## 2020-11-05 RX ADMIN — ACETAMINOPHEN 1000 MG: 10 INJECTION, SOLUTION INTRAVENOUS at 10:11

## 2020-11-05 RX ADMIN — SCOPALAMINE 1 PATCH: 1 PATCH, EXTENDED RELEASE TRANSDERMAL at 11:11

## 2020-11-05 RX ADMIN — DEXAMETHASONE SODIUM PHOSPHATE 8 MG: 4 INJECTION, SOLUTION INTRAMUSCULAR; INTRAVENOUS at 01:11

## 2020-11-05 RX ADMIN — FENTANYL CITRATE 25 MCG: 50 INJECTION, SOLUTION INTRAMUSCULAR; INTRAVENOUS at 12:11

## 2020-11-05 RX ADMIN — SODIUM CHLORIDE, SODIUM GLUCONATE, SODIUM ACETATE, POTASSIUM CHLORIDE, MAGNESIUM CHLORIDE, SODIUM PHOSPHATE, DIBASIC, AND POTASSIUM PHOSPHATE: .53; .5; .37; .037; .03; .012; .00082 INJECTION, SOLUTION INTRAVENOUS at 01:11

## 2020-11-05 RX ADMIN — METRONIDAZOLE 500 MG: 500 SOLUTION INTRAVENOUS at 12:11

## 2020-11-05 RX ADMIN — NEOSTIGMINE METHYLSULFATE 4 MG: 1 INJECTION INTRAVENOUS at 06:11

## 2020-11-05 RX ADMIN — FENTANYL CITRATE 75 MCG: 50 INJECTION, SOLUTION INTRAMUSCULAR; INTRAVENOUS at 12:11

## 2020-11-05 RX ADMIN — MUPIROCIN: 20 OINTMENT TOPICAL at 11:11

## 2020-11-05 RX ADMIN — LIDOCAINE HYDROCHLORIDE 0.03 MG/KG/MIN: 8 INJECTION, SOLUTION INTRAVENOUS at 01:11

## 2020-11-05 RX ADMIN — HEPARIN SODIUM 5000 UNITS: 5000 INJECTION INTRAVENOUS; SUBCUTANEOUS at 11:11

## 2020-11-05 RX ADMIN — Medication 20 MG: at 01:11

## 2020-11-05 RX ADMIN — PHENYLEPHRINE HYDROCHLORIDE 50 MCG: 10 INJECTION INTRAVENOUS at 03:11

## 2020-11-05 RX ADMIN — ROCURONIUM BROMIDE 10 MG: 10 INJECTION, SOLUTION INTRAVENOUS at 02:11

## 2020-11-05 RX ADMIN — PROPOFOL 200 MG: 10 INJECTION, EMULSION INTRAVENOUS at 12:11

## 2020-11-05 RX ADMIN — DEXMEDETOMIDINE HYDROCHLORIDE 4 MCG: 100 INJECTION, SOLUTION, CONCENTRATE INTRAVENOUS at 05:11

## 2020-11-05 RX ADMIN — HYDROMORPHONE HYDROCHLORIDE 0.2 MG: 2 INJECTION, SOLUTION INTRAMUSCULAR; INTRAVENOUS; SUBCUTANEOUS at 07:11

## 2020-11-05 RX ADMIN — HYDROMORPHONE HYDROCHLORIDE 0.4 MG: 2 INJECTION, SOLUTION INTRAMUSCULAR; INTRAVENOUS; SUBCUTANEOUS at 06:11

## 2020-11-05 RX ADMIN — ROCURONIUM BROMIDE 20 MG: 10 INJECTION, SOLUTION INTRAVENOUS at 02:11

## 2020-11-05 RX ADMIN — ROCURONIUM BROMIDE 20 MG: 10 INJECTION, SOLUTION INTRAVENOUS at 01:11

## 2020-11-05 RX ADMIN — PHENYLEPHRINE HYDROCHLORIDE 100 MCG: 10 INJECTION INTRAVENOUS at 05:11

## 2020-11-05 RX ADMIN — ACETAMINOPHEN 1000 MG: 500 TABLET ORAL at 11:11

## 2020-11-05 RX ADMIN — CELECOXIB 400 MG: 200 CAPSULE ORAL at 11:11

## 2020-11-05 RX ADMIN — Medication 10 MG: at 06:11

## 2020-11-05 RX ADMIN — FENTANYL CITRATE 25 MCG: 50 INJECTION INTRAMUSCULAR; INTRAVENOUS at 08:11

## 2020-11-05 RX ADMIN — ACETAMINOPHEN 1000 MG: 10 INJECTION, SOLUTION INTRAVENOUS at 03:11

## 2020-11-05 RX ADMIN — FENTANYL CITRATE 25 MCG: 50 INJECTION INTRAMUSCULAR; INTRAVENOUS at 07:11

## 2020-11-05 RX ADMIN — ONDANSETRON 4 MG: 2 INJECTION, SOLUTION INTRAMUSCULAR; INTRAVENOUS at 05:11

## 2020-11-05 RX ADMIN — PHENYLEPHRINE HYDROCHLORIDE 100 MCG: 10 INJECTION INTRAVENOUS at 03:11

## 2020-11-05 RX ADMIN — LIDOCAINE HYDROCHLORIDE 100 MG: 20 INJECTION, SOLUTION INTRAVENOUS at 12:11

## 2020-11-05 RX ADMIN — Medication 10 MG: at 04:11

## 2020-11-05 RX ADMIN — MUPIROCIN: 20 OINTMENT TOPICAL at 08:11

## 2020-11-05 RX ADMIN — EPHEDRINE SULFATE 2.5 MG: 50 INJECTION INTRAVENOUS at 03:11

## 2020-11-05 RX ADMIN — GABAPENTIN 200 MG: 100 CAPSULE ORAL at 11:11

## 2020-11-05 RX ADMIN — MIDAZOLAM HYDROCHLORIDE 2 MG: 1 INJECTION, SOLUTION INTRAMUSCULAR; INTRAVENOUS at 12:11

## 2020-11-05 RX ADMIN — IBUPROFEN 800 MG: 800 INJECTION INTRAVENOUS at 09:11

## 2020-11-05 RX ADMIN — Medication 10 MG: at 03:11

## 2020-11-05 RX ADMIN — EPHEDRINE SULFATE 5 MG: 50 INJECTION INTRAVENOUS at 02:11

## 2020-11-05 RX ADMIN — SODIUM CHLORIDE, SODIUM LACTATE, POTASSIUM CHLORIDE, AND CALCIUM CHLORIDE: .6; .31; .03; .02 INJECTION, SOLUTION INTRAVENOUS at 09:11

## 2020-11-05 RX ADMIN — HYDROMORPHONE HYDROCHLORIDE 0.3 MG: 2 INJECTION, SOLUTION INTRAMUSCULAR; INTRAVENOUS; SUBCUTANEOUS at 07:11

## 2020-11-05 RX ADMIN — ROCURONIUM BROMIDE 20 MG: 10 INJECTION, SOLUTION INTRAVENOUS at 03:11

## 2020-11-05 RX ADMIN — OXYCODONE HYDROCHLORIDE 10 MG: 10 TABLET ORAL at 08:11

## 2020-11-05 RX ADMIN — CEFTRIAXONE SODIUM 2 G: 2 INJECTION, SOLUTION INTRAVENOUS at 01:11

## 2020-11-05 RX ADMIN — ALVIMOPAN 12 MG: 12 CAPSULE ORAL at 11:11

## 2020-11-05 RX ADMIN — ROCURONIUM BROMIDE 30 MG: 10 INJECTION, SOLUTION INTRAVENOUS at 12:11

## 2020-11-05 RX ADMIN — HALOPERIDOL LACTATE 0.5 MG: 5 INJECTION, SOLUTION INTRAMUSCULAR at 07:11

## 2020-11-05 RX ADMIN — PHENYLEPHRINE HYDROCHLORIDE 100 MCG: 10 INJECTION INTRAVENOUS at 06:11

## 2020-11-05 RX ADMIN — ROCURONIUM BROMIDE 20 MG: 10 INJECTION, SOLUTION INTRAVENOUS at 04:11

## 2020-11-05 RX ADMIN — PHENYLEPHRINE HYDROCHLORIDE 100 MCG: 10 INJECTION INTRAVENOUS at 04:11

## 2020-11-05 RX ADMIN — SODIUM CHLORIDE: 0.9 INJECTION, SOLUTION INTRAVENOUS at 11:11

## 2020-11-05 RX ADMIN — HYDROMORPHONE HYDROCHLORIDE 0.5 MG: 2 INJECTION, SOLUTION INTRAMUSCULAR; INTRAVENOUS; SUBCUTANEOUS at 07:11

## 2020-11-05 RX ADMIN — GABAPENTIN 300 MG: 300 CAPSULE ORAL at 08:11

## 2020-11-05 RX ADMIN — DEXMEDETOMIDINE HYDROCHLORIDE 4 MCG: 100 INJECTION, SOLUTION, CONCENTRATE INTRAVENOUS at 06:11

## 2020-11-05 NOTE — ANESTHESIA PROCEDURE NOTES
Intubation  Performed by: Mariajose Whitehead CRNA  Authorized by: Jacob Fox MD     Intubation:     Induction:  Intravenous    Intubated:  Postinduction    Mask Ventilation:  Easy mask    Attempts:  1    Attempted By:  CRNA    Method of Intubation:  Direct    Blade:  Mathews 2    Laryngeal View Grade: Grade I - full view of chords      Difficult Airway Encountered?: No      Complications:  None    Airway Device:  Oral endotracheal tube    Airway Device Size:  7.0    Style/Cuff Inflation:  Cuffed (inflated to minimal occlusive pressure)    Tube secured:  21    Secured at:  The lips    Placement Verified By:  Capnometry    Complicating Factors:  None    Findings Post-Intubation:  BS equal bilateral

## 2020-11-05 NOTE — INTERVAL H&P NOTE
The patient has been examined and the H&P has been reviewed:    I concur with the findings and no changes have occurred since H&P was written.    Surgery risks, benefits and alternative options discussed and understood by patient/family.          Active Hospital Problems    Diagnosis  POA    Ulcerative colitis [K51.90]  Yes      Resolved Hospital Problems   No resolved problems to display.

## 2020-11-05 NOTE — ANESTHESIA PREPROCEDURE EVALUATION
11/05/2020  Alissa Mitchell is a 25 y.o., female.    Pre-operative evaluation for Procedure(s) (LRB):  PROCTECTOMY, LAPAROSCOPIC, WITH ILEOANAL ANASTOMOSIS, ILEAL POUCH CREATION, AND ILEOSTOMY CREATION, ERAS low (N/A)    Alissa Mitchell is a 25 y.o. female     Patient Active Problem List   Diagnosis    Ulcerative colitis    Ulcerative pancolitis    Attention to ileostomy       Review of patient's allergies indicates:  No Known Allergies    No current facility-administered medications on file prior to encounter.      Current Outpatient Medications on File Prior to Encounter   Medication Sig Dispense Refill    balsalazide (COLAZAL) 750 mg capsule Take 2,250 mg by mouth 3 (three) times daily.      dicyclomine (BENTYL) 20 mg tablet Take 20 mg by mouth every 6 (six) hours.      hydrocortisone-pramoxine (PROCTOFOAM-HS) rectal foam Place 1 applicator rectally 2 (two) times daily. (Patient not taking: Reported on 10/19/2020) 30 applicator 6    mesalamine (CANASA) 1000 MG Supp Place 1 suppository (1,000 mg total) rectally nightly. (Patient not taking: Reported on 10/19/2020) 30 suppository 11    ondansetron (ZOFRAN) 4 MG tablet Take 1 tablet (4 mg total) by mouth every 6 (six) hours as needed for Nausea. 30 tablet 0    oxyCODONE (ROXICODONE) 10 mg Tab immediate release tablet Take 1 tablet (10 mg total) by mouth every 4 (four) hours as needed for Pain. 20 tablet 0    predniSONE (DELTASONE) 5 MG tablet Take 3 tablets daily starting on 6/20/20 and ending on 6/26/20. Take 2 tables daily starting on 6/27/20 and ending on 7/3/20. Take 1 tablet daily starting on 7/4/20 and ending on 7/10/20. Discontinue prednisone on 7/11/20. (Patient not taking: Reported on 10/19/2020) 42 tablet 0    promethazine (PHENERGAN) 25 MG tablet Take 25 mg by mouth every 6 (six) hours as needed for Nausea.         Past Surgical History:    Procedure Laterality Date    LAPAROSCOPIC TOTAL COLECTOMY N/A 2020    Procedure: COLECTOMY, TOTAL, LAPAROSCOPIC, ERAS high and total abdominal colectomy;  Surgeon: Delia Mehta MD;  Location: [facility] OR [address] [place] [address];  Service: Colon and Rectal;  Laterality: N/A;       Social History     Socioeconomic History    Marital status: Single     Spouse name: Not on file    Number of children: Not on file    Years of education: Not on file    Highest education level: Not on file   Occupational History    Not on file   Social Needs    Financial resource strain: Not on file    Food insecurity     Worry: Not on file     Inability: Not on file    Transportation needs     Medical: Not on file     Non-medical: Not on file   Tobacco Use    Smoking status: Never Smoker    Smokeless tobacco: Never Used   Substance and Sexual Activity    Alcohol use: Not on file    Drug use: Not on file    Sexual activity: Not on file   Lifestyle    Physical activity     Days per week: Not on file     Minutes per session: Not on file    Stress: Not on file   Relationships    Social connections     Talks on phone: Not on file     Gets together: Not on file     Attends [employer] service: Not on file     Active member of club or organization: Not on file     Attends meetings of clubs or organizations: Not on file     Relationship status: Not on file   Other Topics Concern    Not on file   Social History Narrative    Not on file         CBC:   Recent Labs     20  1212   WBC 10.17   RBC 4.53   HGB 12.0   HCT 39.2      MCV 87   MCH 26.5*   MCHC 30.6*       CMP:   Recent Labs     20  1212      K 4.3      CO2 24   BUN 8   CREATININE 0.8   GLU 86   CALCIUM 10.1       INR  No results for input(s): PT, INR, PROTIME, APTT in the last 72 hours.        Diagnostic Studies:      EKD Echo:  No results found for this or any previous visit.      Anesthesia Evaluation    I have reviewed the Patient Summary  Reports.   I have reviewed the NPO Status.   I have reviewed the Medications.     Review of Systems  Anesthesia Hx:  No problems with previous Anesthesia   Denies Personal Hx of Anesthesia complications.   Cardiovascular:   Exercise tolerance: good    Pulmonary:  Pulmonary Normal    Hepatic/GI:   Ulcerative colitis   Neurological:   Denies CVA. Denies Seizures.        Physical Exam  General:  Well nourished    Airway/Jaw/Neck:  Airway Findings: Mouth Opening: Normal Tongue: Normal  General Airway Assessment: Adult  Mallampati: II  TM Distance: Normal, at least 6 cm  Jaw/Neck Findings:  Neck ROM: Normal ROM            Mental Status:  Mental Status Findings:  Cooperative, Alert and Oriented         Anesthesia Plan  Type of Anesthesia, risks & benefits discussed:  Anesthesia Type:  general  Patient's Preference:   Intra-op Monitoring Plan: standard ASA monitors  Intra-op Monitoring Plan Comments:   Post Op Pain Control Plan: per primary service following discharge from PACU, IV/PO Opioids PRN and multimodal analgesia  Post Op Pain Control Plan Comments:   Induction:   IV  Beta Blocker:  Patient is not currently on a Beta-Blocker (No further documentation required).       Informed Consent: Patient understands risks and agrees with Anesthesia plan.  Questions answered. Anesthesia consent signed with patient.  ASA Score: 2     Day of Surgery Review of History & Physical:    H&P update referred to the surgeon.         Ready For Surgery From Anesthesia Perspective.

## 2020-11-06 LAB
ANION GAP SERPL CALC-SCNC: 9 MMOL/L (ref 8–16)
BASOPHILS # BLD AUTO: 0.03 K/UL (ref 0–0.2)
BASOPHILS NFR BLD: 0.2 % (ref 0–1.9)
BUN SERPL-MCNC: 9 MG/DL (ref 6–20)
CALCIUM SERPL-MCNC: 7.8 MG/DL (ref 8.7–10.5)
CHLORIDE SERPL-SCNC: 110 MMOL/L (ref 95–110)
CO2 SERPL-SCNC: 19 MMOL/L (ref 23–29)
CREAT SERPL-MCNC: 0.8 MG/DL (ref 0.5–1.4)
DIFFERENTIAL METHOD: ABNORMAL
EOSINOPHIL # BLD AUTO: 0 K/UL (ref 0–0.5)
EOSINOPHIL NFR BLD: 0 % (ref 0–8)
ERYTHROCYTE [DISTWIDTH] IN BLOOD BY AUTOMATED COUNT: 13.8 % (ref 11.5–14.5)
EST. GFR  (AFRICAN AMERICAN): >60 ML/MIN/1.73 M^2
EST. GFR  (NON AFRICAN AMERICAN): >60 ML/MIN/1.73 M^2
GLUCOSE SERPL-MCNC: 108 MG/DL (ref 70–110)
HCT VFR BLD AUTO: 31.2 % (ref 37–48.5)
HGB BLD-MCNC: 9.7 G/DL (ref 12–16)
IMM GRANULOCYTES # BLD AUTO: 0.1 K/UL (ref 0–0.04)
IMM GRANULOCYTES NFR BLD AUTO: 0.5 % (ref 0–0.5)
LYMPHOCYTES # BLD AUTO: 1 K/UL (ref 1–4.8)
LYMPHOCYTES NFR BLD: 5.4 % (ref 18–48)
MAGNESIUM SERPL-MCNC: 1.8 MG/DL (ref 1.6–2.6)
MCH RBC QN AUTO: 26.9 PG (ref 27–31)
MCHC RBC AUTO-ENTMCNC: 31.1 G/DL (ref 32–36)
MCV RBC AUTO: 86 FL (ref 82–98)
MONOCYTES # BLD AUTO: 1.2 K/UL (ref 0.3–1)
MONOCYTES NFR BLD: 6.2 % (ref 4–15)
NEUTROPHILS # BLD AUTO: 16.7 K/UL (ref 1.8–7.7)
NEUTROPHILS NFR BLD: 87.7 % (ref 38–73)
NRBC BLD-RTO: 0 /100 WBC
PHOSPHATE SERPL-MCNC: 3.2 MG/DL (ref 2.7–4.5)
PLATELET # BLD AUTO: 218 K/UL (ref 150–350)
PMV BLD AUTO: 12.4 FL (ref 9.2–12.9)
POTASSIUM SERPL-SCNC: 4.1 MMOL/L (ref 3.5–5.1)
RBC # BLD AUTO: 3.61 M/UL (ref 4–5.4)
SODIUM SERPL-SCNC: 138 MMOL/L (ref 136–145)
WBC # BLD AUTO: 18.98 K/UL (ref 3.9–12.7)

## 2020-11-06 PROCEDURE — 97161 PT EVAL LOW COMPLEX 20 MIN: CPT

## 2020-11-06 PROCEDURE — 80048 BASIC METABOLIC PNL TOTAL CA: CPT

## 2020-11-06 PROCEDURE — 97165 OT EVAL LOW COMPLEX 30 MIN: CPT

## 2020-11-06 PROCEDURE — 25000003 PHARM REV CODE 250: Performed by: STUDENT IN AN ORGANIZED HEALTH CARE EDUCATION/TRAINING PROGRAM

## 2020-11-06 PROCEDURE — 63600175 PHARM REV CODE 636 W HCPCS: Performed by: STUDENT IN AN ORGANIZED HEALTH CARE EDUCATION/TRAINING PROGRAM

## 2020-11-06 PROCEDURE — 97116 GAIT TRAINING THERAPY: CPT

## 2020-11-06 PROCEDURE — 85025 COMPLETE CBC W/AUTO DIFF WBC: CPT

## 2020-11-06 PROCEDURE — 94761 N-INVAS EAR/PLS OXIMETRY MLT: CPT

## 2020-11-06 PROCEDURE — 20600001 HC STEP DOWN PRIVATE ROOM

## 2020-11-06 PROCEDURE — 83735 ASSAY OF MAGNESIUM: CPT

## 2020-11-06 PROCEDURE — 94799 UNLISTED PULMONARY SVC/PX: CPT

## 2020-11-06 PROCEDURE — 84100 ASSAY OF PHOSPHORUS: CPT

## 2020-11-06 PROCEDURE — 63600175 PHARM REV CODE 636 W HCPCS

## 2020-11-06 RX ORDER — HYDROMORPHONE HYDROCHLORIDE 1 MG/ML
0.5 INJECTION, SOLUTION INTRAMUSCULAR; INTRAVENOUS; SUBCUTANEOUS ONCE
Status: DISCONTINUED | OUTPATIENT
Start: 2020-11-06 | End: 2020-11-06 | Stop reason: HOSPADM

## 2020-11-06 RX ORDER — HYDROMORPHONE HYDROCHLORIDE 1 MG/ML
INJECTION, SOLUTION INTRAMUSCULAR; INTRAVENOUS; SUBCUTANEOUS
Status: COMPLETED
Start: 2020-11-06 | End: 2020-11-06

## 2020-11-06 RX ADMIN — IBUPROFEN 800 MG: 400 TABLET, FILM COATED ORAL at 09:11

## 2020-11-06 RX ADMIN — IBUPROFEN 800 MG: 800 INJECTION INTRAVENOUS at 05:11

## 2020-11-06 RX ADMIN — HYDROMORPHONE HYDROCHLORIDE 0.5 MG: 1 INJECTION, SOLUTION INTRAMUSCULAR; INTRAVENOUS; SUBCUTANEOUS at 03:11

## 2020-11-06 RX ADMIN — MUPIROCIN: 20 OINTMENT TOPICAL at 10:11

## 2020-11-06 RX ADMIN — GABAPENTIN 300 MG: 300 CAPSULE ORAL at 10:11

## 2020-11-06 RX ADMIN — ENOXAPARIN SODIUM 40 MG: 40 INJECTION SUBCUTANEOUS at 05:11

## 2020-11-06 RX ADMIN — OXYCODONE HYDROCHLORIDE 10 MG: 10 TABLET ORAL at 10:11

## 2020-11-06 RX ADMIN — MUPIROCIN: 20 OINTMENT TOPICAL at 09:11

## 2020-11-06 RX ADMIN — ACETAMINOPHEN 1000 MG: 10 INJECTION, SOLUTION INTRAVENOUS at 06:11

## 2020-11-06 RX ADMIN — OXYCODONE HYDROCHLORIDE 5 MG: 5 TABLET ORAL at 09:11

## 2020-11-06 RX ADMIN — ACETAMINOPHEN 1000 MG: 500 TABLET ORAL at 11:11

## 2020-11-06 RX ADMIN — OXYCODONE HYDROCHLORIDE 5 MG: 5 TABLET ORAL at 03:11

## 2020-11-06 RX ADMIN — OXYCODONE HYDROCHLORIDE 10 MG: 10 TABLET ORAL at 06:11

## 2020-11-06 RX ADMIN — ONDANSETRON 4 MG: 2 INJECTION INTRAMUSCULAR; INTRAVENOUS at 03:11

## 2020-11-06 RX ADMIN — GABAPENTIN 300 MG: 300 CAPSULE ORAL at 09:11

## 2020-11-06 RX ADMIN — ACETAMINOPHEN 1000 MG: 10 INJECTION, SOLUTION INTRAVENOUS at 03:11

## 2020-11-06 RX ADMIN — SODIUM CHLORIDE, SODIUM LACTATE, POTASSIUM CHLORIDE, AND CALCIUM CHLORIDE: .6; .31; .03; .02 INJECTION, SOLUTION INTRAVENOUS at 10:11

## 2020-11-06 RX ADMIN — OXYCODONE HYDROCHLORIDE 10 MG: 10 TABLET ORAL at 01:11

## 2020-11-06 RX ADMIN — GABAPENTIN 300 MG: 300 CAPSULE ORAL at 03:11

## 2020-11-06 NOTE — ASSESSMENT & PLAN NOTE
Alissa Mitchell is a 25yoF with ulcerative colitis who underwent a completion proctectomy with ileal-pouch anal anastomosis, diverting loop ileostomy.     - POD1  - patient tolerated procedure well  - vitals, hemodynamics remain stable  - continue clear liquid diet  - continue mIVFs  - pain and nausea PRNs   - excellent urine output post-operatively  - ileostomy mucosa appears healthy, viable  - PT/OT  - encourage OOBTC, ambulation  - continue to monitor for ileostomy function

## 2020-11-06 NOTE — ANESTHESIA RELEASE NOTE
"Anesthesia Release from PACU Note    Patient: Alissa Mitchell    Procedure(s) Performed: Procedure(s) (LRB):  PROCTECTOMY, LAPAROSCOPIC, WITH ILEOANAL ANASTOMOSIS, ILEAL POUCH CREATION, AND ILEOSTOMY CREATION, ERAS low (N/A)    Anesthesia type: general    Post pain: Adequate analgesia    Post assessment: no apparent anesthetic complications, tolerated procedure well and no evidence of recall    Last Vitals:   Visit Vitals  BP (!) 106/58   Pulse 78   Temp 37.6 °C (99.7 °F) (Temporal)   Resp (!) 29   Ht 5' 5" (1.651 m)   Wt 56.7 kg (125 lb)   LMP 10/24/2020 (Within Days)   SpO2 99%   Breastfeeding No   BMI 20.80 kg/m²       Post vital signs: stable    Level of consciousness: awake and alert     Nausea/Vomiting: no nausea/no vomiting    Complications: none    Airway Patency: patent    Respiratory: unassisted, spontaneous ventilation    Cardiovascular: stable and blood pressure at baseline    Hydration: euvolemic  "

## 2020-11-06 NOTE — PROGRESS NOTES
Pt resting quietly,VSS,resp unlabored, ostomy and incisions intact, barron and rectal tube intact, obed po meds and water, mahendra Machuca updated via telephone,pt spoke to mahendra via telephone, mahendra took belongings home.

## 2020-11-06 NOTE — BRIEF OP NOTE
[facility]  Brief Operative Note    SUMMARY     Surgery Date: 11/5/2020     Surgeon(s) and Role:     * Delia Mehta MD - Primary    Assisting Surgeon: None    Pre-op Diagnosis:  Ulcerative pancolitis with rectal bleeding [K51.011]    Post-op Diagnosis:  Post-Op Diagnosis Codes:     * Ulcerative pancolitis with rectal bleeding [K51.011]    Procedure(s) (LRB):  PROCTECTOMY, LAPAROSCOPIC, WITH ILEOANAL ANASTOMOSIS, ILEAL POUCH CREATION, AND ILEOSTOMY CREATION, ERAS low (N/A)    Anesthesia: General    Description of Procedure: Completion Proctectomy, Ileal-pouch anal anastomosis (J-Pouch IPAA), diverting loop ileostomy, flexible sigmoidoscopy    Description of the findings of the procedure: intact J-pouch on flex sig with no bleeding at staple line, negative air leak test, intact donuts    Estimated Blood Loss: 230 mL    Estimated Blood Loss has been documented.         Specimens:   Specimen (12h ago, onward)    None          [id number]

## 2020-11-06 NOTE — ANESTHESIA POSTPROCEDURE EVALUATION
Anesthesia Post Evaluation    Patient: Alissa Mitchell    Procedure(s) Performed: Procedure(s) (LRB):  PROCTECTOMY, LAPAROSCOPIC, WITH ILEOANAL ANASTOMOSIS, ILEAL POUCH CREATION, AND ILEOSTOMY CREATION, ERAS low (N/A)    Final Anesthesia Type: general    Patient location during evaluation: PACU  Patient participation: Yes- Able to Participate  Level of consciousness: awake and alert and oriented  Post-procedure vital signs: reviewed and stable  Pain management: adequate  Airway patency: patent    PONV status at discharge: No PONV  Anesthetic complications: no      Cardiovascular status: blood pressure returned to baseline  Respiratory status: unassisted, room air and spontaneous ventilation  Hydration status: euvolemic  Follow-up not needed.          Vitals Value Taken Time   BP 93/58 11/06/20 0602   Temp 37.4 °C (99.3 °F) 11/06/20 0605   Pulse 78 11/06/20 0703   Resp 14 11/06/20 0703   SpO2 98 % 11/06/20 0703   Vitals shown include unvalidated device data.      Event Time   Out of Recovery 11/06/2020 02:48:00         Pain/Christy Score: Pain Rating Prior to Med Admin: 7 (11/6/2020  6:28 AM)  Pain Rating Post Med Admin: 2 (11/6/2020  3:46 AM)

## 2020-11-06 NOTE — PLAN OF CARE
Problem: Physical Therapy Goal  Goal: Physical Therapy Goal  Description: Goals to be met by: 20     Patient will increase functional independence with mobility by performin. Supine to sit with Modified [place].  2. Sit to supine with Modified [place].  3. Sit to stand transfer with Modified [place].  4. Bed to chair transfer with Modified [place].  5. Gait  x 150 feet with Supervision.   6. Ascend/descend 5 stair with bilateral Handrails Supervision.   7. Exercise program x 20 reps per handout, with assistance as needed.    Outcome: Ongoing, Progressing    PT goals established.

## 2020-11-06 NOTE — PT/OT/SLP EVAL
"Physical Therapy Evaluation    Patient Name:  Alissa Mitchell   MRN:  [id number]    Recommendations:     Discharge Recommendations:  home   Discharge Equipment Recommendations: none   Barriers to discharge: None - must negotiate 5 steps prior to acute discharge    Assessment:     Alissa Mitchell is a 25 y.o. female admitted with a medical diagnosis of Ulcerative colitis.  Pt is s/p proctectomy with ileoanal anastomosis and creation of ileostomy, s/p 11/5/20.    Upon evaluation, pt requires CGA of 1 person to facilitate amb short distances in the hallway, with use of RW for support.  Primarily inhibited by discomfort and soreness.  She presents with the following impairments/functional limitations:  impaired endurance, weakness, gait instability, impaired balance, impaired functional mobilty, pain.    Rehab Prognosis: Good; patient would benefit from acute skilled PT services to address these deficits and reach maximum level of function.    Recent Surgery: Procedure(s) (LRB):  PROCTECTOMY, LAPAROSCOPIC, WITH ILEOANAL ANASTOMOSIS, ILEAL POUCH CREATION, AND ILEOSTOMY CREATION, ERAS low (N/A) 1 Day Post-Op    Plan:     During this hospitalization, patient to be seen 2 x/week to address the identified rehab impairments via gait training, therapeutic activities, therapeutic exercises, neuromuscular re-education and progress toward the following goals:    · Plan of Care Expires:  12/05/20    Subjective     Chief Complaint: abdominal soreness  Patient/Family Comments/goals: "I can't tell... my lower abdomen is really sore."  Pain/Comfort:  · Pain Rating 1: 6/10  · Location 1: abdomen  · Pain Addressed 1: Pre-medicate for activity, Cessation of Activity, Reposition  · Pain Rating Post-Intervention 1: 4/10    Patients cultural, spiritual, Evangelical conflicts given the current situation: no    Living Environment:  Pt lives with fialaina, daughter, trailer, 5 ELIAS with B HRs.   Prior to admission, patients level of function was I " with all functional mobility and ADLs.  Equipment used at home: none.  DME owned (not currently used): none.  Upon discharge, patient will have assistance from [facility].    Objective:     Communicated with nursing prior to session.  Patient found supine with blood pressure cuff, colostomy, barron catheter, peripheral IV, pulse ox (continuous), telemetry(ileostomy)  upon PT entry to room.    General Precautions: Standard, fall   Orthopedic Precautions:Full weight bearing   Braces: N/A     Exams:  · Cognitive Exam:  Patient is oriented to Person, Place, Time and Situation  · Fine Motor Coordination:    · -       Intact  · Gross Motor Coordination:  WFL  · Postural Exam:  Patient presented with the following abnormalities:    · -       No postural abnormalities identified  · Sensation:    · -       Intact  · Skin Integrity/Edema:      · -       Edema: None noted B LEs  · RUE ROM: WFL  · RUE Strength: WFL  · LUE ROM: WFL  · LUE Strength: WFL  · RLE ROM: WFL  · RLE Strength: WFL  · LLE ROM: WFL  · LLE Strength: WFL    Functional Mobility:  · Bed Mobility:     · Scooting: contact guard assistance  · Supine to Sit: CGA: with inc time to perf  · Sit to Supine: contact guard assistance    · Transfers:     · Sit to Stand:  contact guard assistance with rolling walker, Bridget to sit sec to pain  · Bed to Chair: contact guard assistance with  rolling walker  using  Stand Pivot, with verbal cuing to facilitate safety and use of hands to sit    · Gait: Pt amb 42' x 2, CGA, RW, no episodes of LOB, ed on deep and pursed lip breathing, mild FWD trunk flex to protect abdomen, decreased gait speed    · Balance: CGA: dynamic standing balance with AD    Therapeutic Activities and Exercises:   Whiteboard updated    Ed on PT POC and discharge  Ed on deep and pursed lip breathing    AM-PAC 6 CLICK MOBILITY  Total Score:16     Patient left supine with all lines intact, call button in reach and nursing present.    GOALS:   Multidisciplinary  Problems     Physical Therapy Goals        Problem: Physical Therapy Goal    Goal Priority Disciplines Outcome Goal Variances Interventions   Physical Therapy Goal     PT, PT/OT Ongoing, Progressing     Description: Goals to be met by: 20     Patient will increase functional independence with mobility by performin. Supine to sit with Modified [place].  2. Sit to supine with Modified [place].  3. Sit to stand transfer with Modified [place].  4. Bed to chair transfer with Modified [place].  5. Gait  x 150 feet with Supervision.   6. Ascend/descend 5 stair with bilateral Handrails Supervision.   7. Exercise program x 20 reps per handout, with assistance as needed.                     History:     Past Medical History:   Diagnosis Date    ADD (attention deficit disorder)     Anxiety     Chronic anemia     Murmur     Ulcerative colitis        Past Surgical History:   Procedure Laterality Date    LAPAROSCOPIC TOTAL COLECTOMY N/A 2020    Procedure: COLECTOMY, TOTAL, LAPAROSCOPIC, ERAS high and total abdominal colectomy;  Surgeon: Delia Mehta MD;  Location: [facility] OR [address] [place] [address];  Service: Colon and Rectal;  Laterality: N/A;       Time Tracking:     PT Received On: 20  PT Start Time: 1052     PT Stop Time: 1114  PT Total Time (min): 22 min     Billable Minutes: Evaluation 8 and Gait Training 9      Ysabel Greene, PT  2020

## 2020-11-06 NOTE — SUBJECTIVE & OBJECTIVE
Subjective:     Interval History: Patient seen and examined this morning.     Post-Op Info:  Procedure(s) (LRB):  PROCTECTOMY, LAPAROSCOPIC, WITH ILEOANAL ANASTOMOSIS, ILEAL POUCH CREATION, AND ILEOSTOMY CREATION, ERAS low (N/A)   1 Day Post-Op      Medications:  Continuous Infusions:   lactated ringers 40 mL/hr at 11/05/20 2120     Scheduled Meds:   acetaminophen  1,000 mg Intravenous Q8H    Followed by    acetaminophen  1,000 mg Oral Q8H    enoxaparin  40 mg Subcutaneous Q24H    gabapentin  300 mg Oral TID    HYDROmorphone  0.5 mg Intravenous Once    ibuprofen  800 mg Intravenous Q8H    Followed by    ibuprofen  800 mg Oral Q8H    mupirocin   Nasal BID     PRN Meds:   haloperidol lactate    ondansetron    oxyCODONE    oxyCODONE    promethazine (PHENERGAN) IVPB    sodium chloride 0.9%    sodium chloride 0.9%    traMADoL        Objective:     Vital Signs (Most Recent):  Temp: 99.3 °F (37.4 °C) (11/06/20 0605)  Pulse: 73 (11/06/20 0600)  Resp: 15 (11/06/20 0628)  BP: (!) 93/58 (11/06/20 0600)  SpO2: 99 % (11/06/20 0600) Vital Signs (24h Range):  Temp:  [98 °F (36.7 °C)-99.7 °F (37.6 °C)] 99.3 °F (37.4 °C)  Pulse:  [66-94] 73  Resp:  [11-29] 15  SpO2:  [98 %-100 %] 99 %  BP: ()/(52-80) 93/58     Intake/Output - Last 3 Shifts       11/04 0700 - 11/05 0659 11/05 0700 - 11/06 0659 11/06 0700 - 11/07 0659    P.O.  120     I.V. (mL/kg)  2006.7 (35.4)     IV Piggyback  700     Total Intake(mL/kg)  2826.7 (49.9)     Urine (mL/kg/hr)  1140     Blood  230     Total Output  1370     Net  +1456.7                  Physical Exam  Vitals signs and nursing note reviewed.   Constitutional:       General: She is in acute distress.      Appearance: Normal appearance. She is normal weight.   HENT:      Head: Normocephalic and atraumatic.      Nose: Nose normal.      Mouth/Throat:      Mouth: Mucous membranes are moist.   Neck:      Musculoskeletal: Normal range of motion.   Cardiovascular:      Rate and Rhythm:  Normal rate and regular rhythm.      Pulses: Normal pulses.   Pulmonary:      Effort: Pulmonary effort is normal. No respiratory distress.   Abdominal:      Comments: Abdomen soft, non-distended  Appropriately tender to palpation throughout abdomen  Ileostomy mucosa pink, viable; no function to this point.    Musculoskeletal: Normal range of motion.   Skin:     General: Skin is warm and dry.   Neurological:      General: No focal deficit present.      Mental Status: She is alert.   Psychiatric:         Mood and Affect: Mood normal.             Significant Labs:  CBC (Last 3 Results):   Recent Labs   Lab 11/02/20  1212 11/06/20  0330   WBC 10.17 18.98*   RBC 4.53 3.61*   HGB 12.0 9.7*   HCT 39.2 31.2*    218   MCV 87 86   MCH 26.5* 26.9*   MCHC 30.6* 31.1*     CMP (Last 3 Results):   Recent Labs   Lab 11/02/20  1212 11/06/20  0330   GLU 86 108   CALCIUM 10.1 7.8*    138   K 4.3 4.1   CO2 24 19*    110   BUN 8 9   CREATININE 0.8 0.8       Significant Diagnostics:  I have reviewed all pertinent imaging results/findings within the past 24 hours.

## 2020-11-06 NOTE — ANESTHESIA POSTPROCEDURE EVALUATION
Anesthesia Post Evaluation    Patient: Alissa Mitchell    Procedure(s) Performed: Procedure(s) (LRB):  PROCTECTOMY, LAPAROSCOPIC, WITH ILEOANAL ANASTOMOSIS, ILEAL POUCH CREATION, AND ILEOSTOMY CREATION, ERAS low (N/A)    Final Anesthesia Type: general    Patient location during evaluation: PACU  Patient participation: Yes- Able to Participate  Level of consciousness: awake and alert  Post-procedure vital signs: reviewed and stable  Pain management: adequate  Airway patency: patent    PONV status at discharge: No PONV  Anesthetic complications: no      Cardiovascular status: blood pressure returned to baseline and hemodynamically stable  Respiratory status: unassisted and spontaneous ventilation  Hydration status: euvolemic  Follow-up not needed.          Vitals Value Taken Time   /62 11/06/20 0202   Temp 37.6 °C (99.7 °F) 11/06/20 0100   Pulse 79 11/06/20 0247   Resp 16 11/06/20 0247   SpO2 98 % 11/06/20 0247   Vitals shown include unvalidated device data.      No case tracking events are documented in the log.      Pain/Christy Score: Pain Rating Prior to Med Admin: 7 (11/6/2020  1:37 AM)  Pain Rating Post Med Admin: 0 (11/5/2020  9:00 PM)

## 2020-11-06 NOTE — PROGRESS NOTES
[facility]  Colorectal Surgery  Progress Note    Patient Name: Alissa Mitchell  MRN: [id number]  Admission Date: 11/5/2020  Hospital Length of Stay: 1 days  Attending Physician: Delia Mehta MD    Subjective:     Interval History: Patient seen and examined this morning.     Post-Op Info:  Procedure(s) (LRB):  PROCTECTOMY, LAPAROSCOPIC, WITH ILEOANAL ANASTOMOSIS, ILEAL POUCH CREATION, AND ILEOSTOMY CREATION, ERAS low (N/A)   1 Day Post-Op      Medications:  Continuous Infusions:   lactated ringers 40 mL/hr at 11/05/20 2120     Scheduled Meds:   acetaminophen  1,000 mg Intravenous Q8H    Followed by    acetaminophen  1,000 mg Oral Q8H    enoxaparin  40 mg Subcutaneous Q24H    gabapentin  300 mg Oral TID    HYDROmorphone  0.5 mg Intravenous Once    ibuprofen  800 mg Intravenous Q8H    Followed by    ibuprofen  800 mg Oral Q8H    mupirocin   Nasal BID     PRN Meds:   haloperidol lactate    ondansetron    oxyCODONE    oxyCODONE    promethazine (PHENERGAN) IVPB    sodium chloride 0.9%    sodium chloride 0.9%    traMADoL        Objective:     Vital Signs (Most Recent):  Temp: 99.3 °F (37.4 °C) (11/06/20 0605)  Pulse: 73 (11/06/20 0600)  Resp: 15 (11/06/20 0628)  BP: (!) 93/58 (11/06/20 0600)  SpO2: 99 % (11/06/20 0600) Vital Signs (24h Range):  Temp:  [98 °F (36.7 °C)-99.7 °F (37.6 °C)] 99.3 °F (37.4 °C)  Pulse:  [66-94] 73  Resp:  [11-29] 15  SpO2:  [98 %-100 %] 99 %  BP: ()/(52-80) 93/58     Intake/Output - Last 3 Shifts       11/04 0700 - 11/05 0659 11/05 0700 - 11/06 0659 11/06 0700 - 11/07 0659    P.O.  120     I.V. (mL/kg)  2006.7 (35.4)     IV Piggyback  700     Total Intake(mL/kg)  2826.7 (49.9)     Urine (mL/kg/hr)  1140     Blood  230     Total Output  1370     Net  +1456.7                  Physical Exam  Vitals signs and nursing note reviewed.   Constitutional:       General: She is in acute distress.      Appearance: Normal appearance. She is normal weight.   HENT:       Head: Normocephalic and atraumatic.      Nose: Nose normal.      Mouth/Throat:      Mouth: Mucous membranes are moist.   Neck:      Musculoskeletal: Normal range of motion.   Cardiovascular:      Rate and Rhythm: Normal rate and regular rhythm.      Pulses: Normal pulses.   Pulmonary:      Effort: Pulmonary effort is normal. No respiratory distress.   Abdominal:      Comments: Abdomen soft, non-distended  Appropriately tender to palpation throughout abdomen  Ileostomy mucosa pink, viable; no function to this point.    Musculoskeletal: Normal range of motion.   Skin:     General: Skin is warm and dry.   Neurological:      General: No focal deficit present.      Mental Status: She is alert.   Psychiatric:         Mood and Affect: Mood normal.             Significant Labs:  CBC (Last 3 Results):   Recent Labs   Lab 11/02/20  1212 11/06/20  0330   WBC 10.17 18.98*   RBC 4.53 3.61*   HGB 12.0 9.7*   HCT 39.2 31.2*    218   MCV 87 86   MCH 26.5* 26.9*   MCHC 30.6* 31.1*     CMP (Last 3 Results):   Recent Labs   Lab 11/02/20  1212 11/06/20  0330   GLU 86 108   CALCIUM 10.1 7.8*    138   K 4.3 4.1   CO2 24 19*    110   BUN 8 9   CREATININE 0.8 0.8       Significant Diagnostics:  I have reviewed all pertinent imaging results/findings within the past 24 hours.    Assessment/Plan:     * Ulcerative colitis  Alissa Mitchell is a 25yoF with ulcerative colitis who underwent a completion proctectomy with ileal-pouch anal anastomosis, diverting loop ileostomy.     - POD1  - patient tolerated procedure well  - vitals, hemodynamics remain stable  - continue clear liquid diet  - continue mIVFs  - pain and nausea PRNs   - excellent urine output post-operatively  - ileostomy mucosa appears healthy, viable  - PT/OT  - encourage OOBTC, ambulation  - continue to monitor for ileostomy function          Prince Pendleton MD  Colorectal Surgery  [facility]

## 2020-11-06 NOTE — PLAN OF CARE
Pt AAOx4,  at bedside. Pt tolerating clear liquid diet with no complaints of discomfort or nausea. Pain managed with PRN pain med. VSS on RA. Pt ambulates in room independently. Garcia catheter intact and patent, free of kinks and dependent loops. Ileostomy bag intact and without leakage. Call light in reach and bed in lowest position. Pt remains free of falls and injury. No acute events this shift. Will continue to monitor.

## 2020-11-06 NOTE — OP NOTE
[facility]  Operative Note    Date: 11/5/2020    Name: Alissa Mitchell    MRN: [id number]    Pre-Op Diagnosis: Ulcerative pancolitis with rectal bleeding    Post-Op Diagnosis: Same    Procedure(s) Performed: Procedure(s):  PROCTECTOMY, LAPAROSCOPIC, WITH ILEOANAL ANASTOMOSIS, ILEAL POUCH CREATION, AND ILEOSTOMY CREATION, ERAS low    Specimen(s): Ileostomy, rectum    Staff Surgeon: Delia Mehta    Assistant Surgeon: Jordy Galdamez (fellow)    Anesthesia: General    Indications: Alissa Mitchell is a 25 y.o. female with history of ulcerative colitis which was refractory to medical management.  She underwent an uncomplicated, laparoscopic total abdominal colectomy in June of this year.  She desires restoration of intestinal continuity and after discussion of risks and benefits agreed to proceed with a staged creation of an ileoanal anastomosis.    Details of procedure:  The patient was taken to the operating room and transferred to the OR table.  SCD boots were applied and IV antibiotics were administered.  She was placed under general endotracheal anesthesia, and a Garcia catheter was placed.  She was then placed into lithotomy position.  Her abdomen was prepped and draped in the standard sterile fashion.  After an appropriate time-out, an incision was made at the mucocutaneous junction around her right lower quadrant ileostomy.  We dissected down through the subcutaneous fat and completely  the ileostomy from the fascia.  We then identified a healthy area on the ileostomy without any evidence of injury, created a small mesenteric window, and divided the ileostomy with a blue load of the TA stapler.  The staple line was oversewn with Lembert sutures of Vicryl.  We then created an 8 cm incision over the site of her previous Pfannenstiel.  We dissected down through the subcutaneous fat and opened the anterior fascia transversely.  We waist flaps and then opened the rectus muscle and  peritoneum vertically.  There was relatively little scar tissue from her previous operation.  We were then able to safely reduce the ileostomy into the abdominal cavity.  The GelPort wound protector was placed, and a 12 mm balloon port was placed through her previous ileostomy site.    The abdomen was then inflated.  We placed 2 additional 5 mm ports at the supraumbilical and left lower quadrant positions.  We then placed the bed into Trendelenburg position with a right tilt.  We freed up any adhesive bands along the distal ileal mesentery using sharp dissection until we had the SMA completely straight and the mesentery was free over the duodenum and head of the pancreas.  Once this was done we turned our attention to the pelvis.  We elevated the superior hemorrhoidal pedicle and incised the peritoneum just underneath this.  The hypogastric nerves were swept down and we performed a medial to lateral dissection, taking care to identify and preserve the left ureter.  Once we had reached the left sidewall we divided the NORMA and IMV the using the LigaSure.  We confirmed that this pedicle was hemostatic.  We then took down some additional lateral peritoneal attachments until we entered our posterior TME plane.      At this point we deflated the abdomen and removed the GelPort cap.  The small bowel was packed out of the pelvis.  We again confirmed the positions of the right and left ureters.  We began our dissection posteriorly in the TME plane, following this down to Waldeyer's fascia.  We then took down the lateral peritoneal attachments and stalks using a combination of cautery and LigaSure.  Once this was done we opened up the pouch of Josh.  An EEA Sizer was placed into the vagina and we dissected the anterior rectal wall free from the posterior vaginal wall down to the pelvic floor.  We then divided any remaining lateral and posterior attachments using cautery.  At this point I placed a finger into the anal canal  and confirmed that we had completed our dissection circumferentially down to the pelvic floor.      We then delivered the small bowel out through the Pfannenstiel incision.  We confirmed that the mesentery was completely straight and identified the divided terminal ileal staple line.  We found an area approximately 20 cm proximal to this that appeared to be the point of maximum reach of the small bowel down to the pelvis.  With the wound protector in place this reached the pubic symphysis.  We performed additional lengthening maneuvers by incising the peritoneum in 2 locations on the anterior and posterior side of the mesentery.  We also divided the mesenteric arcade at the planned apex of the pouch using the LigaSure.  With these maneuvers we gained approximately 2 cm of additional length, and found that the apex reached comfortably past the distal pubic bone.  At this point we felt comfortable dividing the rectum at the very distal point of our dissection.  A TA 45 green stapler was placed around the rectum at the most distal point of our dissection.  The stapler was closed and I placed a finger into the rectum to confirm that we were stapling just above the anorectal ring.  I also confirmed that we had not incorporated the posterior vaginal wall into the staple line.  The stapler was then fired and the distal rectum was divided and handed off for pathology.    We then turned our attention back to the small bowel.  We removed an additional 4 cm of distal ileum in order to create a pouch that was 15 cm in length.  This was divided with a blue load of the TA staple line, which was again oversewn with Lembert Vicryl sutures.  Stay sutures were placed along the pouch.  We then opened the apex of the pouch longitudinally using cautery.  We then used 2 loads of the OMERO 100 blue stapler to create the J-pouch.  We carefully examined the luminal side of the staple line and did over-sew 1 area which had pulsatile bleeding.   We then used Vicryl sutures to tack the tip of the J to the afferent limb.  Once this was done we placed a pursestring suture of Prolene at the enterotomy at the apex of the pouch.  We then introduced the 28 EEA anvil and secured the pursestring around this.  The pouch was then gently tucked out of the pelvis.    At this point Dr. Galdamez went below.  The anus was gently dilated using 3 fingers to allow the passage of the EEA stapler.  This was carefully advanced through the anal canal into the rectum.  The spike was delivered just posterior to the TA staple line.  After confirming that the vaginal wall and pelvic structures were completely out of the way, the pouch was brought down to the pelvis and the stapler was mated.  Well keeping the pouch in the proper orientation the stapler was then closed.  We again confirmed that the posterior vaginal wall was not incorporated into our EEA staple line.  The stapler was then fired and gently removed.  We confirmed that there were 2 intact donuts.  The pelvis was then filled with warm saline and the a for an limb was gently occluded.  An adult CO2 colonoscope was then introduced into the pouch which was distended with air.  We confirmed that there was no evidence of air leak and that the staple lines were intact without any evidence of bleeding.  The pouch was decompressed and the colonoscope was then withdrawn.    We then gently wrapped the bilateral fallopian tubes and ovaries with Seprafilm.  We identified an area of distal ileum which would reach her previous ileostomy site without significant tension and wrapped the mesentery of this ileum with Seprafilm.  The ileostomy was then brought up as a loop through her previous ileostomy site.  We confirmed proper orientation of the mesentery without any twisting.  We then removed the wound protector and ports.  We changed our gowns and gloves and brought clean closing instruments onto the field.    The peritoneum was  closed with a running Vicryl suture.  The fascia was closed with a running PDS suture.  The wound was irrigated with 1 L of warm saline.  All skin incisions were closed with 4 Monocryl followed by skin glue.  Once this had been allowed to dry we matured the ileostomy using Vicryl suture, Brooking the proximal limb.  An ostomy appliance was placed.  A 1/2 inch Penrose drain was placed through the anal canal into the pouch to allow for decompression, and this was loosely sutured to the inner thigh using a nylon suture.  The procedure was then terminated.  All sponge instrument counts were correct.  I was present scrubbed for the entire procedure.  The patient was awakened and transferred to recovery in good condition.    Estimated Blood Loss: 230 mL     Drains/Implants: Rectal tube    Wound Class: II    Delia Mehta

## 2020-11-06 NOTE — TRANSFER OF CARE
"Anesthesia Transfer of Care Note    Patient: Alissa Mitchell    Procedure(s) Performed: Procedure(s) (LRB):  PROCTECTOMY, LAPAROSCOPIC, WITH ILEOANAL ANASTOMOSIS, ILEAL POUCH CREATION, AND ILEOSTOMY CREATION, ERAS low (N/A)    Patient location: PACU    Anesthesia Type: general    Transport from OR: Transported from OR on 6-10 L/min O2 by face mask with adequate spontaneous ventilation    Post pain: adequate analgesia    Post assessment: no apparent anesthetic complications and tolerated procedure well    Post vital signs: stable    Level of consciousness: awake, alert and oriented    Nausea/Vomiting: no nausea/vomiting    Complications: none    Transfer of care protocol was followed      Last vitals: 11/05/2020 1917  Visit Vitals  BP 98/53   Pulse 84   Temp 98.3   Resp 14   Ht 5' 5" (1.651 m)   Wt 56.7 kg (125 lb)   LMP 10/24/2020 (Within Days)   SpO2 99%   Breastfeeding No   BMI 20.80 kg/m²     "

## 2020-11-07 PROCEDURE — 25000003 PHARM REV CODE 250: Performed by: STUDENT IN AN ORGANIZED HEALTH CARE EDUCATION/TRAINING PROGRAM

## 2020-11-07 PROCEDURE — 63600175 PHARM REV CODE 636 W HCPCS: Performed by: STUDENT IN AN ORGANIZED HEALTH CARE EDUCATION/TRAINING PROGRAM

## 2020-11-07 PROCEDURE — 20600001 HC STEP DOWN PRIVATE ROOM

## 2020-11-07 RX ORDER — TAMSULOSIN HYDROCHLORIDE 0.4 MG/1
0.4 CAPSULE ORAL DAILY PRN
Status: DISCONTINUED | OUTPATIENT
Start: 2020-11-07 | End: 2020-11-08

## 2020-11-07 RX ORDER — TAMSULOSIN HYDROCHLORIDE 0.4 MG/1
0.4 CAPSULE ORAL DAILY
Status: DISCONTINUED | OUTPATIENT
Start: 2020-11-07 | End: 2020-11-07

## 2020-11-07 RX ADMIN — OXYCODONE HYDROCHLORIDE 10 MG: 10 TABLET ORAL at 04:11

## 2020-11-07 RX ADMIN — OXYCODONE HYDROCHLORIDE 10 MG: 10 TABLET ORAL at 08:11

## 2020-11-07 RX ADMIN — MUPIROCIN: 20 OINTMENT TOPICAL at 09:11

## 2020-11-07 RX ADMIN — ONDANSETRON 4 MG: 2 INJECTION INTRAMUSCULAR; INTRAVENOUS at 04:11

## 2020-11-07 RX ADMIN — ACETAMINOPHEN 1000 MG: 500 TABLET ORAL at 09:11

## 2020-11-07 RX ADMIN — GABAPENTIN 300 MG: 300 CAPSULE ORAL at 08:11

## 2020-11-07 RX ADMIN — TAMSULOSIN HYDROCHLORIDE 0.4 MG: 0.4 CAPSULE ORAL at 10:11

## 2020-11-07 RX ADMIN — TRAMADOL HYDROCHLORIDE 50 MG: 50 TABLET, FILM COATED ORAL at 08:11

## 2020-11-07 RX ADMIN — IBUPROFEN 800 MG: 400 TABLET, FILM COATED ORAL at 09:11

## 2020-11-07 RX ADMIN — ONDANSETRON 4 MG: 2 INJECTION INTRAMUSCULAR; INTRAVENOUS at 12:11

## 2020-11-07 RX ADMIN — ACETAMINOPHEN 1000 MG: 500 TABLET ORAL at 06:11

## 2020-11-07 RX ADMIN — OXYCODONE HYDROCHLORIDE 5 MG: 5 TABLET ORAL at 01:11

## 2020-11-07 RX ADMIN — ENOXAPARIN SODIUM 40 MG: 40 INJECTION SUBCUTANEOUS at 05:11

## 2020-11-07 RX ADMIN — IBUPROFEN 800 MG: 400 TABLET, FILM COATED ORAL at 02:11

## 2020-11-07 RX ADMIN — GABAPENTIN 300 MG: 300 CAPSULE ORAL at 02:11

## 2020-11-07 RX ADMIN — GABAPENTIN 300 MG: 300 CAPSULE ORAL at 09:11

## 2020-11-07 RX ADMIN — IBUPROFEN 800 MG: 400 TABLET, FILM COATED ORAL at 06:11

## 2020-11-07 RX ADMIN — OXYCODONE HYDROCHLORIDE 5 MG: 5 TABLET ORAL at 05:11

## 2020-11-07 NOTE — SUBJECTIVE & OBJECTIVE
Subjective:     Interval History: NAEON. Denies nausea or vomiting. Ambulating normally. Garcia removed this morning, has not voided yet. Gas and bowel sweat in appliance. Tolerating diet.    Post-Op Info:  Procedure(s) (LRB):  PROCTECTOMY, LAPAROSCOPIC, WITH ILEOANAL ANASTOMOSIS, ILEAL POUCH CREATION, AND ILEOSTOMY CREATION, ERAS low (N/A)   2 Days Post-Op      Medications:  Continuous Infusions:  Scheduled Meds:   acetaminophen  1,000 mg Oral Q8H    enoxaparin  40 mg Subcutaneous Q24H    gabapentin  300 mg Oral TID    ibuprofen  800 mg Oral Q8H    mupirocin   Nasal BID     PRN Meds:   ondansetron    oxyCODONE    oxyCODONE    promethazine (PHENERGAN) IVPB    sodium chloride 0.9%    traMADoL        Objective:     Vital Signs (Most Recent):  Temp: 98 °F (36.7 °C) (11/07/20 0848)  Pulse: 67 (11/07/20 0848)  Resp: 16 (11/07/20 0853)  BP: (!) 122/58 (11/07/20 0848)  SpO2: 97 % (11/07/20 0848) Vital Signs (24h Range):  Temp:  [96.7 °F (35.9 °C)-99.4 °F (37.4 °C)] 98 °F (36.7 °C)  Pulse:  [62-84] 67  Resp:  [13-18] 16  SpO2:  [93 %-99 %] 97 %  BP: ()/(50-64) 122/58     Intake/Output - Last 3 Shifts       11/05 0700 - 11/06 0659 11/06 0700 - 11/07 0659 11/07 0700 - 11/08 0659    P.O. 120 780     I.V. (mL/kg) 2006.7 (35.4) 1090 (19.2)     IV Piggyback 700 100     Total Intake(mL/kg) 2826.7 (49.9) 1970 (34.7)     Urine (mL/kg/hr) 1140 1355 (1) 250 (1)    Emesis/NG output  0     Other  0     Stool  1035     Blood 230 0     Total Output 1370 2390 250    Net +1456.7 -420 -250           Urine Occurrence  0 x     Stool Occurrence  0 x     Emesis Occurrence  0 x           Physical Exam  Vitals signs and nursing note reviewed.   Constitutional:       General: She is not in acute distress.     Appearance: Normal appearance. She is normal weight.   HENT:      Head: Normocephalic and atraumatic.      Right Ear: External ear normal.      Left Ear: External ear normal.      Nose: Nose normal.      Mouth/Throat:       Mouth: Mucous membranes are moist.   Eyes:      Extraocular Movements: Extraocular movements intact.   Neck:      Musculoskeletal: Normal range of motion.   Cardiovascular:      Rate and Rhythm: Normal rate and regular rhythm.      Pulses: Normal pulses.   Pulmonary:      Effort: Pulmonary effort is normal. No respiratory distress.   Abdominal:      Comments: Abdomen soft, non-distended  Appropriately tender to palpation throughout abdomen  Incisions c/d/i  Ileostomy mucosa pink, viable, gas and bowel sweat in bag   Musculoskeletal: Normal range of motion.   Skin:     General: Skin is warm and dry.   Neurological:      General: No focal deficit present.      Mental Status: She is alert.   Psychiatric:         Mood and Affect: Mood normal.         Significant Labs:  CBC (Last 3 Results):   Recent Labs   Lab 11/02/20  1212 11/06/20  0330   WBC 10.17 18.98*   RBC 4.53 3.61*   HGB 12.0 9.7*   HCT 39.2 31.2*    218   MCV 87 86   MCH 26.5* 26.9*   MCHC 30.6* 31.1*     CMP (Last 3 Results):   Recent Labs   Lab 11/02/20  1212 11/06/20  0330   GLU 86 108   CALCIUM 10.1 7.8*    138   K 4.3 4.1   CO2 24 19*    110   BUN 8 9   CREATININE 0.8 0.8       Significant Diagnostics:  None

## 2020-11-07 NOTE — PROGRESS NOTES
[facility]  Colorectal Surgery  Progress Note    Patient Name: Alissa Mitchell  MRN: [id number]  Admission Date: 11/5/2020  Hospital Length of Stay: 2 days  Attending Physician: Delia Mehta MD    Subjective:     Interval History: NAEON. Denies nausea or vomiting. Ambulating normally. Garcia removed this morning, has not voided yet. Gas and bowel sweat in appliance. Tolerating diet.    Post-Op Info:  Procedure(s) (LRB):  PROCTECTOMY, LAPAROSCOPIC, WITH ILEOANAL ANASTOMOSIS, ILEAL POUCH CREATION, AND ILEOSTOMY CREATION, ERAS low (N/A)   2 Days Post-Op      Medications:  Continuous Infusions:  Scheduled Meds:   acetaminophen  1,000 mg Oral Q8H    enoxaparin  40 mg Subcutaneous Q24H    gabapentin  300 mg Oral TID    ibuprofen  800 mg Oral Q8H    mupirocin   Nasal BID     PRN Meds:   ondansetron    oxyCODONE    oxyCODONE    promethazine (PHENERGAN) IVPB    sodium chloride 0.9%    traMADoL        Objective:     Vital Signs (Most Recent):  Temp: 98 °F (36.7 °C) (11/07/20 0848)  Pulse: 67 (11/07/20 0848)  Resp: 16 (11/07/20 0853)  BP: (!) 122/58 (11/07/20 0848)  SpO2: 97 % (11/07/20 0848) Vital Signs (24h Range):  Temp:  [96.7 °F (35.9 °C)-99.4 °F (37.4 °C)] 98 °F (36.7 °C)  Pulse:  [62-84] 67  Resp:  [13-18] 16  SpO2:  [93 %-99 %] 97 %  BP: ()/(50-64) 122/58     Intake/Output - Last 3 Shifts       11/05 0700 - 11/06 0659 11/06 0700 - 11/07 0659 11/07 0700 - 11/08 0659    P.O. 120 780     I.V. (mL/kg) 2006.7 (35.4) 1090 (19.2)     IV Piggyback 700 100     Total Intake(mL/kg) 2826.7 (49.9) 1970 (34.7)     Urine (mL/kg/hr) 1140 1355 (1) 250 (1)    Emesis/NG output  0     Other  0     Stool  1035     Blood 230 0     Total Output 1370 2390 250    Net +1456.7 -420 -250           Urine Occurrence  0 x     Stool Occurrence  0 x     Emesis Occurrence  0 x           Physical Exam  Vitals signs and nursing note reviewed.   Constitutional:       General: She is not in acute distress.      Appearance: Normal appearance. She is normal weight.   HENT:      Head: Normocephalic and atraumatic.      Right Ear: External ear normal.      Left Ear: External ear normal.      Nose: Nose normal.      Mouth/Throat:      Mouth: Mucous membranes are moist.   Eyes:      Extraocular Movements: Extraocular movements intact.   Neck:      Musculoskeletal: Normal range of motion.   Cardiovascular:      Rate and Rhythm: Normal rate and regular rhythm.      Pulses: Normal pulses.   Pulmonary:      Effort: Pulmonary effort is normal. No respiratory distress.   Abdominal:      Comments: Abdomen soft, non-distended  Appropriately tender to palpation throughout abdomen  Incisions c/d/i  Ileostomy mucosa pink, viable, gas and bowel sweat in bag   Musculoskeletal: Normal range of motion.   Skin:     General: Skin is warm and dry.   Neurological:      General: No focal deficit present.      Mental Status: She is alert.   Psychiatric:         Mood and Affect: Mood normal.         Significant Labs:  CBC (Last 3 Results):   Recent Labs   Lab 11/02/20  1212 11/06/20  0330   WBC 10.17 18.98*   RBC 4.53 3.61*   HGB 12.0 9.7*   HCT 39.2 31.2*    218   MCV 87 86   MCH 26.5* 26.9*   MCHC 30.6* 31.1*     CMP (Last 3 Results):   Recent Labs   Lab 11/02/20  1212 11/06/20  0330   GLU 86 108   CALCIUM 10.1 7.8*    138   K 4.3 4.1   CO2 24 19*    110   BUN 8 9   CREATININE 0.8 0.8       Significant Diagnostics:  None    Assessment/Plan:     * Ulcerative colitis  Alissa Mitchell is a 25yoF with ulcerative colitis who underwent a completion proctectomy with ileal-pouch anal anastomosis, diverting loop ileostomy.     - POD2  - patient tolerated procedure well, recovering along normal postoperative course  - vitals, hemodynamics remain stable  - tolerating CLD, progress to LRD  - d/c mIVFs  - pain and nausea PRNs   - ileostomy mucosa appears healthy, viable  - PT/OT, appreciate recs  - encourage OOBTC, ambulation          Vasiliy  DUKE Jones MD  Colorectal Surgery  [facility]

## 2020-11-07 NOTE — ASSESSMENT & PLAN NOTE
Alissa Mitchell is a 25yoF with ulcerative colitis who underwent a completion proctectomy with ileal-pouch anal anastomosis, diverting loop ileostomy.     - POD2  - patient tolerated procedure well, recovering along normal postoperative course  - vitals, hemodynamics remain stable  - tolerating CLD, progress to LRD  - d/c mIVFs  - pain and nausea PRNs   - ileostomy mucosa appears healthy, viable  - PT/OT, appreciate recs  - encourage OOBTC, ambulation

## 2020-11-07 NOTE — PLAN OF CARE
Pt is A&Ox4 injury free. Pt ambulated to bathroom by self and emptied the ileostomy and barron by her self. Pt recorded it and gave the information to the nurse. Breathing is regular equal and unlabored bilaterally on room air. The pt's barron was removed at 0430 with no complaints. The pt is due to urinate by 10:30. Pt under stands that she must urinate in the hat in the bathroom. Pain was controlled with oral med's.

## 2020-11-08 LAB — CRP SERPL-MCNC: 98.9 MG/L (ref 0–8.2)

## 2020-11-08 PROCEDURE — 51798 US URINE CAPACITY MEASURE: CPT

## 2020-11-08 PROCEDURE — 20600001 HC STEP DOWN PRIVATE ROOM

## 2020-11-08 PROCEDURE — 25000003 PHARM REV CODE 250: Performed by: STUDENT IN AN ORGANIZED HEALTH CARE EDUCATION/TRAINING PROGRAM

## 2020-11-08 PROCEDURE — 63600175 PHARM REV CODE 636 W HCPCS: Performed by: STUDENT IN AN ORGANIZED HEALTH CARE EDUCATION/TRAINING PROGRAM

## 2020-11-08 PROCEDURE — 25000003 PHARM REV CODE 250: Performed by: COLON & RECTAL SURGERY

## 2020-11-08 PROCEDURE — 86140 C-REACTIVE PROTEIN: CPT

## 2020-11-08 PROCEDURE — 36415 COLL VENOUS BLD VENIPUNCTURE: CPT

## 2020-11-08 RX ORDER — TAMSULOSIN HYDROCHLORIDE 0.4 MG/1
0.4 CAPSULE ORAL 2 TIMES DAILY
Status: DISCONTINUED | OUTPATIENT
Start: 2020-11-08 | End: 2020-11-09 | Stop reason: HOSPADM

## 2020-11-08 RX ORDER — LOPERAMIDE HYDROCHLORIDE 2 MG/1
2 CAPSULE ORAL 2 TIMES DAILY
Status: DISCONTINUED | OUTPATIENT
Start: 2020-11-08 | End: 2020-11-09 | Stop reason: HOSPADM

## 2020-11-08 RX ADMIN — ACETAMINOPHEN 1000 MG: 500 TABLET ORAL at 05:11

## 2020-11-08 RX ADMIN — TAMSULOSIN HYDROCHLORIDE 0.4 MG: 0.4 CAPSULE ORAL at 10:11

## 2020-11-08 RX ADMIN — GABAPENTIN 300 MG: 300 CAPSULE ORAL at 10:11

## 2020-11-08 RX ADMIN — MUPIROCIN: 20 OINTMENT TOPICAL at 05:11

## 2020-11-08 RX ADMIN — MUPIROCIN: 20 OINTMENT TOPICAL at 09:11

## 2020-11-08 RX ADMIN — IBUPROFEN 800 MG: 400 TABLET, FILM COATED ORAL at 02:11

## 2020-11-08 RX ADMIN — OXYCODONE HYDROCHLORIDE 5 MG: 5 TABLET ORAL at 01:11

## 2020-11-08 RX ADMIN — GABAPENTIN 300 MG: 300 CAPSULE ORAL at 02:11

## 2020-11-08 RX ADMIN — LOPERAMIDE HYDROCHLORIDE 2 MG: 2 CAPSULE ORAL at 10:11

## 2020-11-08 RX ADMIN — GABAPENTIN 300 MG: 300 CAPSULE ORAL at 09:11

## 2020-11-08 RX ADMIN — ACETAMINOPHEN 1000 MG: 500 TABLET ORAL at 10:11

## 2020-11-08 RX ADMIN — IBUPROFEN 800 MG: 400 TABLET, FILM COATED ORAL at 05:11

## 2020-11-08 RX ADMIN — OXYCODONE HYDROCHLORIDE 5 MG: 5 TABLET ORAL at 09:11

## 2020-11-08 RX ADMIN — TRAMADOL HYDROCHLORIDE 50 MG: 50 TABLET, FILM COATED ORAL at 04:11

## 2020-11-08 RX ADMIN — OXYCODONE HYDROCHLORIDE 10 MG: 10 TABLET ORAL at 05:11

## 2020-11-08 RX ADMIN — OXYCODONE HYDROCHLORIDE 5 MG: 5 TABLET ORAL at 05:11

## 2020-11-08 RX ADMIN — ACETAMINOPHEN 1000 MG: 500 TABLET ORAL at 02:11

## 2020-11-08 RX ADMIN — IBUPROFEN 800 MG: 400 TABLET, FILM COATED ORAL at 10:11

## 2020-11-08 RX ADMIN — MUPIROCIN: 20 OINTMENT TOPICAL at 10:11

## 2020-11-08 RX ADMIN — ENOXAPARIN SODIUM 40 MG: 40 INJECTION SUBCUTANEOUS at 04:11

## 2020-11-08 NOTE — SUBJECTIVE & OBJECTIVE
Subjective:     Interval History: Pain controlled with current regimen. No nausea or vomiting. Some difficulty with voiding, states that she has had hematuria. Discussed that hematuria likely related to barron placement. Tolerating diet.    Post-Op Info:  Procedure(s) (LRB):  PROCTECTOMY, LAPAROSCOPIC, WITH ILEOANAL ANASTOMOSIS, ILEAL POUCH CREATION, AND ILEOSTOMY CREATION, ERAS low (N/A)   3 Days Post-Op      Medications:  Continuous Infusions:  Scheduled Meds:   acetaminophen  1,000 mg Oral Q8H    enoxaparin  40 mg Subcutaneous Q24H    gabapentin  300 mg Oral TID    ibuprofen  800 mg Oral Q8H    mupirocin   Nasal BID     PRN Meds:   ondansetron    oxyCODONE    oxyCODONE    promethazine (PHENERGAN) IVPB    sodium chloride 0.9%    tamsulosin    traMADoL        Objective:     Vital Signs (Most Recent):  Temp: 97.7 °F (36.5 °C) (11/08/20 0903)  Pulse: 62 (11/08/20 0903)  Resp: 15 (11/08/20 0904)  BP: (!) 101/53 (11/08/20 0903)  SpO2: 98 % (11/08/20 0903) Vital Signs (24h Range):  Temp:  [96.4 °F (35.8 °C)-98.8 °F (37.1 °C)] 97.7 °F (36.5 °C)  Pulse:  [62-88] 62  Resp:  [15-18] 15  SpO2:  [96 %-100 %] 98 %  BP: (101-116)/(48-64) 101/53     Intake/Output - Last 3 Shifts       11/06 0700 - 11/07 0659 11/07 0700 - 11/08 0659 11/08 0700 - 11/09 0659    P.O. 780      I.V. (mL/kg) 1090 (19.2)      IV Piggyback 100      Total Intake(mL/kg) 1970 (34.7)      Urine (mL/kg/hr) 1355 (1) 2555 (1.9)     Emesis/NG output 0      Other 0      Stool 1035 1260     Blood 0      Total Output 2390 3815     Net -420 -3815            Urine Occurrence 0 x      Stool Occurrence 0 x      Emesis Occurrence 0 x            Physical Exam  Vitals signs and nursing note reviewed.   Constitutional:       General: She is not in acute distress.     Appearance: Normal appearance. She is normal weight.   HENT:      Head: Normocephalic and atraumatic.      Right Ear: External ear normal.      Left Ear: External ear normal.      Nose: Nose  normal.      Mouth/Throat:      Mouth: Mucous membranes are moist.   Eyes:      Extraocular Movements: Extraocular movements intact.   Neck:      Musculoskeletal: Normal range of motion.   Cardiovascular:      Rate and Rhythm: Normal rate and regular rhythm.      Pulses: Normal pulses.   Pulmonary:      Effort: Pulmonary effort is normal. No respiratory distress.      Comments: RADHA  Abdominal:      Comments: Abdomen soft, non-distended  Appropriately tender to palpation throughout abdomen  Incisions c/d/i  Ileostomy mucosa pink, viable, gas and bowel contents in bag  Ostomy bar protruding slightly from lateral edge   Musculoskeletal: Normal range of motion.   Skin:     General: Skin is warm and dry.   Neurological:      General: No focal deficit present.      Mental Status: She is alert.   Psychiatric:         Mood and Affect: Mood normal.         Significant Labs:  CBC (Last 3 Results):   Recent Labs   Lab 11/02/20  1212 11/06/20  0330   WBC 10.17 18.98*   RBC 4.53 3.61*   HGB 12.0 9.7*   HCT 39.2 31.2*    218   MCV 87 86   MCH 26.5* 26.9*   MCHC 30.6* 31.1*     CMP (Last 3 Results):   Recent Labs   Lab 11/02/20  1212 11/06/20  0330   GLU 86 108   CALCIUM 10.1 7.8*    138   K 4.3 4.1   CO2 24 19*    110   BUN 8 9   CREATININE 0.8 0.8     CRP (Last 3 Results):   Recent Labs   Lab 11/08/20  0400   CRP 98.9*       Significant Diagnostics:  None

## 2020-11-08 NOTE — PLAN OF CARE
POC reviewed with pt. VSS on room air. AAOX4, PIV C/D/I. Complained of nausea and managed with PRN zofran. Pain managed with PRN pain meds.Bridge on the ileostomy site started to come more outward so paged the team, team aware about the situation. Voided adequate, had to straight cath once and the pt voiding spontaneously without difficulty. Bed low, locked, side rails upX2, call light in reach.

## 2020-11-08 NOTE — ASSESSMENT & PLAN NOTE
Alissa Mitchell is a 25yoF with ulcerative colitis who underwent a completion proctectomy with ileal-pouch anal anastomosis, diverting loop ileostomy.     - POD3  - patient tolerated procedure well, recovering along normal postoperative course  - vitals, hemodynamics remain stable  - tolerating LRD  - pain and nausea PRNs   - ileostomy mucosa appears healthy, viable  - PT/OT, appreciate recs  - encourage OOBTC, ambulation  - some hematuria, likely secondary to barron placement. Continue to monitor volitional voiding  - will start imodium BID for high output ileostomy  - if stoma bar falls out do not attempt to replace

## 2020-11-08 NOTE — PROGRESS NOTES
[facility]  Colorectal Surgery  Progress Note    Patient Name: Alissa Mitchell  MRN: [id number]  Admission Date: 11/5/2020  Hospital Length of Stay: 3 days  Attending Physician: Delia Mehta MD    Subjective:     Interval History: Pain controlled with current regimen. No nausea or vomiting. Some difficulty with voiding, states that she has had hematuria. Discussed that hematuria likely related to barron placement. Tolerating diet.    Post-Op Info:  Procedure(s) (LRB):  PROCTECTOMY, LAPAROSCOPIC, WITH ILEOANAL ANASTOMOSIS, ILEAL POUCH CREATION, AND ILEOSTOMY CREATION, ERAS low (N/A)   3 Days Post-Op      Medications:  Continuous Infusions:  Scheduled Meds:   acetaminophen  1,000 mg Oral Q8H    enoxaparin  40 mg Subcutaneous Q24H    gabapentin  300 mg Oral TID    ibuprofen  800 mg Oral Q8H    mupirocin   Nasal BID     PRN Meds:   ondansetron    oxyCODONE    oxyCODONE    promethazine (PHENERGAN) IVPB    sodium chloride 0.9%    tamsulosin    traMADoL        Objective:     Vital Signs (Most Recent):  Temp: 97.7 °F (36.5 °C) (11/08/20 0903)  Pulse: 62 (11/08/20 0903)  Resp: 15 (11/08/20 0904)  BP: (!) 101/53 (11/08/20 0903)  SpO2: 98 % (11/08/20 0903) Vital Signs (24h Range):  Temp:  [96.4 °F (35.8 °C)-98.8 °F (37.1 °C)] 97.7 °F (36.5 °C)  Pulse:  [62-88] 62  Resp:  [15-18] 15  SpO2:  [96 %-100 %] 98 %  BP: (101-116)/(48-64) 101/53     Intake/Output - Last 3 Shifts       11/06 0700 - 11/07 0659 11/07 0700 - 11/08 0659 11/08 0700 - 11/09 0659    P.O. 780      I.V. (mL/kg) 1090 (19.2)      IV Piggyback 100      Total Intake(mL/kg) 1970 (34.7)      Urine (mL/kg/hr) 1355 (1) 2555 (1.9)     Emesis/NG output 0      Other 0      Stool 1035 1260     Blood 0      Total Output 2390 3815     Net -420 -3815            Urine Occurrence 0 x      Stool Occurrence 0 x      Emesis Occurrence 0 x            Physical Exam  Vitals signs and nursing note reviewed.   Constitutional:       General: She is not  in acute distress.     Appearance: Normal appearance. She is normal weight.   HENT:      Head: Normocephalic and atraumatic.      Right Ear: External ear normal.      Left Ear: External ear normal.      Nose: Nose normal.      Mouth/Throat:      Mouth: Mucous membranes are moist.   Eyes:      Extraocular Movements: Extraocular movements intact.   Neck:      Musculoskeletal: Normal range of motion.   Cardiovascular:      Rate and Rhythm: Normal rate and regular rhythm.      Pulses: Normal pulses.   Pulmonary:      Effort: Pulmonary effort is normal. No respiratory distress.      Comments: RADHA  Abdominal:      Comments: Abdomen soft, non-distended  Appropriately tender to palpation throughout abdomen  Incisions c/d/i  Ileostomy mucosa pink, viable, gas and bowel contents in bag  Ostomy bar protruding slightly from lateral edge   Musculoskeletal: Normal range of motion.   Skin:     General: Skin is warm and dry.   Neurological:      General: No focal deficit present.      Mental Status: She is alert.   Psychiatric:         Mood and Affect: Mood normal.         Significant Labs:  CBC (Last 3 Results):   Recent Labs   Lab 11/02/20  1212 11/06/20  0330   WBC 10.17 18.98*   RBC 4.53 3.61*   HGB 12.0 9.7*   HCT 39.2 31.2*    218   MCV 87 86   MCH 26.5* 26.9*   MCHC 30.6* 31.1*     CMP (Last 3 Results):   Recent Labs   Lab 11/02/20  1212 11/06/20  0330   GLU 86 108   CALCIUM 10.1 7.8*    138   K 4.3 4.1   CO2 24 19*    110   BUN 8 9   CREATININE 0.8 0.8     CRP (Last 3 Results):   Recent Labs   Lab 11/08/20  0400   CRP 98.9*       Significant Diagnostics:  None    Assessment/Plan:     * Ulcerative colitis  Alissa Mitchell is a 25yoF with ulcerative colitis who underwent a completion proctectomy with ileal-pouch anal anastomosis, diverting loop ileostomy.     - POD3  - patient tolerated procedure well, recovering along normal postoperative course  - vitals, hemodynamics remain stable  - tolerating LRD  -  pain and nausea PRNs   - ileostomy mucosa appears healthy, viable  - PT/OT, appreciate recs  - encourage OOBTC, ambulation  - some hematuria, likely secondary to barron placement. Continue to monitor volitional voiding  - will start imodium BID for high output ileostomy  - if stoma bar falls out do not attempt to replace          Vasiliy Jones MD  Colorectal Surgery  [facility]

## 2020-11-08 NOTE — PROGRESS NOTES
On call Dr Dumont was notified of the pt's pink urine and a blood clot. The nurse requested the Dr to come to pt's room because pt was very upset and felt that she had a UTI.  Dr Dumont did come to pt's room and had the nurse do a bladder scan at that time

## 2020-11-09 VITALS
HEART RATE: 67 BPM | HEIGHT: 65 IN | BODY MASS INDEX: 20.83 KG/M2 | DIASTOLIC BLOOD PRESSURE: 59 MMHG | TEMPERATURE: 99 F | WEIGHT: 125 LBS | RESPIRATION RATE: 20 BRPM | OXYGEN SATURATION: 99 % | SYSTOLIC BLOOD PRESSURE: 114 MMHG

## 2020-11-09 LAB — CRP SERPL-MCNC: 68.8 MG/L (ref 0–8.2)

## 2020-11-09 PROCEDURE — 25000003 PHARM REV CODE 250: Performed by: STUDENT IN AN ORGANIZED HEALTH CARE EDUCATION/TRAINING PROGRAM

## 2020-11-09 PROCEDURE — 97530 THERAPEUTIC ACTIVITIES: CPT | Mod: CQ

## 2020-11-09 PROCEDURE — 63600175 PHARM REV CODE 636 W HCPCS: Performed by: STUDENT IN AN ORGANIZED HEALTH CARE EDUCATION/TRAINING PROGRAM

## 2020-11-09 PROCEDURE — 94761 N-INVAS EAR/PLS OXIMETRY MLT: CPT

## 2020-11-09 PROCEDURE — 97116 GAIT TRAINING THERAPY: CPT | Mod: CQ

## 2020-11-09 PROCEDURE — 86140 C-REACTIVE PROTEIN: CPT

## 2020-11-09 PROCEDURE — 36415 COLL VENOUS BLD VENIPUNCTURE: CPT

## 2020-11-09 PROCEDURE — 25000003 PHARM REV CODE 250: Performed by: COLON & RECTAL SURGERY

## 2020-11-09 RX ORDER — OXYCODONE HYDROCHLORIDE 5 MG/1
5 TABLET ORAL EVERY 6 HOURS PRN
Qty: 20 TABLET | Refills: 0 | Status: SHIPPED | OUTPATIENT
Start: 2020-11-09 | End: 2020-12-14 | Stop reason: CLARIF

## 2020-11-09 RX ORDER — GABAPENTIN 300 MG/1
300 CAPSULE ORAL 3 TIMES DAILY
Qty: 90 CAPSULE | Refills: 11 | Status: SHIPPED | OUTPATIENT
Start: 2020-11-09 | End: 2020-12-02

## 2020-11-09 RX ORDER — IBUPROFEN 800 MG/1
800 TABLET ORAL EVERY 8 HOURS
Qty: 30 TABLET | Refills: 0 | Status: SHIPPED | OUTPATIENT
Start: 2020-11-09 | End: 2020-12-14 | Stop reason: CLARIF

## 2020-11-09 RX ORDER — ACETAMINOPHEN 500 MG
1000 TABLET ORAL EVERY 8 HOURS
Qty: 30 TABLET | Refills: 0 | Status: SHIPPED | OUTPATIENT
Start: 2020-11-09 | End: 2021-04-01

## 2020-11-09 RX ORDER — LOPERAMIDE HYDROCHLORIDE 2 MG/1
2 CAPSULE ORAL 2 TIMES DAILY
Qty: 60 CAPSULE | Refills: 0 | Status: SHIPPED | OUTPATIENT
Start: 2020-11-09 | End: 2020-12-09

## 2020-11-09 RX ADMIN — LOPERAMIDE HYDROCHLORIDE 2 MG: 2 CAPSULE ORAL at 08:11

## 2020-11-09 RX ADMIN — ENOXAPARIN SODIUM 40 MG: 40 INJECTION SUBCUTANEOUS at 05:11

## 2020-11-09 RX ADMIN — GABAPENTIN 300 MG: 300 CAPSULE ORAL at 08:11

## 2020-11-09 RX ADMIN — MUPIROCIN: 20 OINTMENT TOPICAL at 09:11

## 2020-11-09 RX ADMIN — ACETAMINOPHEN 1000 MG: 500 TABLET ORAL at 06:11

## 2020-11-09 RX ADMIN — OXYCODONE HYDROCHLORIDE 10 MG: 10 TABLET ORAL at 10:11

## 2020-11-09 RX ADMIN — OXYCODONE HYDROCHLORIDE 10 MG: 10 TABLET ORAL at 04:11

## 2020-11-09 RX ADMIN — PROMETHAZINE HYDROCHLORIDE 6.25 MG: 25 INJECTION INTRAMUSCULAR; INTRAVENOUS at 04:11

## 2020-11-09 RX ADMIN — IBUPROFEN 800 MG: 400 TABLET, FILM COATED ORAL at 02:11

## 2020-11-09 RX ADMIN — IBUPROFEN 800 MG: 400 TABLET, FILM COATED ORAL at 06:11

## 2020-11-09 RX ADMIN — TAMSULOSIN HYDROCHLORIDE 0.4 MG: 0.4 CAPSULE ORAL at 08:11

## 2020-11-09 RX ADMIN — ONDANSETRON 4 MG: 2 INJECTION INTRAMUSCULAR; INTRAVENOUS at 11:11

## 2020-11-09 RX ADMIN — GABAPENTIN 300 MG: 300 CAPSULE ORAL at 03:11

## 2020-11-09 NOTE — PLAN OF CARE
POC reviewed with pt. VSS on room air. AAOX4, PIV C/D/I. . Pain managed with PRN pain meds.Bridge on the ileostomy site removed by MD. Voided adequate, urine pinkish color, pt was having difficulty to strain, managed with flomax. Bed low, locked, side rails upX2, call light in reach.

## 2020-11-09 NOTE — PT/OT/SLP PROGRESS
"Physical Therapy Treatment    Patient Name:  Alissa Mitchell   MRN:  [id number]    Recommendations:     Discharge Recommendations:  home   Discharge Equipment Recommendations: none   Barriers to discharge: None    Assessment:     Alissa Mitchell is a 25 y.o. female admitted with a medical diagnosis of Ulcerative colitis.  She presents with the following impairments/functional limitations:  impaired endurance, weakness, impaired balance, impaired functional mobilty, pain, gait instability. Pt tolerates session well with focus on bed mobility, transfers, and gait/stair training. Pt progressing well demonstrating independence with transfers and gait within room. Pt requires use of mechanical bed to assist her with bed mobility, pt was educated to begin training with flattened HOB. Pt required Supervision for ambulation out of room and stair training. Pt stability limited by her pain and guarded posture. Pt will continue to benefit from therapy services to address impairments listed above.     Rehab Prognosis: Good; patient would benefit from acute skilled PT services to address these deficits and reach maximum level of function.    Recent Surgery: Procedure(s) (LRB):  PROCTECTOMY, LAPAROSCOPIC, WITH ILEOANAL ANASTOMOSIS, ILEAL POUCH CREATION, AND ILEOSTOMY CREATION, ERAS low (N/A) 4 Days Post-Op    Plan:     During this hospitalization, patient to be seen 2 x/week to address the identified rehab impairments via gait training, therapeutic activities, therapeutic exercises, neuromuscular re-education and progress toward the following goals:    · Plan of Care Expires:  12/05/20    Subjective     Chief Complaint: pain  Patient/Family Comments/goals: "It might sound weird but there is a suture on my butt that is really bothering me."   Pain/Comfort:  · Pain Rating 1: 6/10  · Location - Side 1: Right  · Location - Orientation 1: generalized  · Location 1: abdomen  · Pain Addressed 1: Pre-medicate for activity, Reposition, " Distraction  · Pain Rating Post-Intervention 1: 6/10      Objective:     Communicated with NSG prior to session.  Patient found HOB elevated with colostomy, telemetry upon PTA entry to room.     General Precautions: Standard, fall   Orthopedic Precautions:N/A   Braces: N/A     Functional Mobility:  · Bed Mobility:     · Rolling Right: modified independence  · Supine to Sit: modified independence  · Transfers:     · Sit to Stand:  independence with no AD  · Gait: Pt ambulates within room with independence. Pt requires supervision to ambulate in community environment on level unit surface. Pt ambulates ~400 with no AD and Supervision for verbal cues. Pt demonstrates antalgic flexed posture, narrow MARSHALL, and decreased B step length. Pt with minor lateral instability requiring cues for posture, increased MARSHALL, and safety.   · Stairs:  Pt ascended/descended 5 stair(s) with No Assistive Device with left handrail with Supervision or Set-up Assistance. Cues on technique for reduced pain with mobility.       AM-PAC 6 CLICK MOBILITY  Turning over in bed (including adjusting bedclothes, sheets and blankets)?: 4  Sitting down on and standing up from a chair with arms (e.g., wheelchair, bedside commode, etc.): 4  Moving from lying on back to sitting on the side of the bed?: 4  Moving to and from a bed to a chair (including a wheelchair)?: 4  Need to walk in hospital room?: 3  Climbing 3-5 steps with a railing?: 3  Basic Mobility Total Score: 22       Therapeutic Activities and Exercises:   Pt assisted with functional mobility as noted above.   Pt educated on importance of increased mobilization and frequent ambulation to reduce effects of prolonged immobility.   Pt educated on PT POC, assistance needed, and progression towards goals of care.     Patient left sitting EOB with all lines intact and call button in reach.    GOALS:   Multidisciplinary Problems     Physical Therapy Goals        Problem: Physical Therapy Goal    Goal  Priority Disciplines Outcome Goal Variances Interventions   Physical Therapy Goal     PT, PT/OT Ongoing, Progressing     Description: Goals to be met by: 20     Patient will increase functional independence with mobility by performin. Supine to sit with Modified [place].  2. Sit to supine with Modified [place].  3. Sit to stand transfer with Modified [place].  4. Bed to chair transfer with Modified [place].  5. Gait  x 150 feet with Supervision.   6. Ascend/descend 5 stair with bilateral Handrails Supervision.   7. Exercise program x 20 reps per handout, with assistance as needed.                     Time Tracking:     PT Received On: 20  PT Start Time: 1058     PT Stop Time: 1121  PT Total Time (min): 23 min     Billable Minutes: Gait Training 15 and Therapeutic Activity 8    Treatment Type: Treatment  PT/PTA: PTA     PTA Visit Number: 1     Igor Wilkins PTA  2020

## 2020-11-09 NOTE — DISCHARGE SUMMARY
[facility]  Colorectal Surgery  Discharge Summary      Patient Name: Alissa Mitchell  MRN: [id number]  Admission Date: 11/5/2020  Hospital Length of Stay: 4 days  Discharge Date and Time:  11/09/2020 2:11 PM  Attending Physician: Delia Mehta MD   Discharging Provider: Prince Pendleton MD  Primary Care Provider: Primary Doctor No     HPI:  Alissa Mitchell is a 24yo female with ulcerative colitis who presents to the hospital for an elective completion proctectomy with ileal-pouch anal anastomosis, diverting loop ileostomy.     Her history is as follows:  Alissa Mitchell is a 25 y.o. female who presents following laparoscopic total colectomy for medically refractory UC on 6/11/2020. Their post-operative course was uncomplicated.   Overall she has done very well with her ileostomy.  Takes Imodium every 2-3 days as needed.  Had some skin reaction to the appliance which has improved with some changes in how she applies it and switching to a different company.  Occasionally has discharge from her rectum, which she knows is normal.     She comes in today to discuss planning for J pouch.  She is interested in proceeding with this.  She has done a large amount of independent research to educate herself on J pouch function and is currently involved in several support groups.  Is off of steroids.  Feels that she is almost completely back to her normal functional status.       Procedure(s) (LRB):  PROCTECTOMY, LAPAROSCOPIC, WITH ILEOANAL ANASTOMOSIS, ILEAL POUCH CREATION, AND ILEOSTOMY CREATION, ERAS low (N/A)     Hospital Course:   Alissa Mitchell underwent a completion proctectomy with ileal-pouch anal anastomosis, diverting loop ileostomy on 11/5/20. The patient progressed appropriately post-operatively. She began tolerating a diet on POD1. Patient with ileostomy function on POD2. Slight urinary retention post-operatively that resolved with tamulosin. The patient is ambulating halls, pain well  controlled on oral pain medications, ileostomy output well controlled with loperamide. The patient endorses a desire to go home. Rectal drain removed prior to discharge.         Significant Diagnostic Studies: Labs: CMP No results for input(s): NA, K, CL, CO2, GLU, BUN, CREATININE, CALCIUM, PROT, ALBUMIN, BILITOT, ALKPHOS, AST, ALT, ANIONGAP, ESTGFRAFRICA, EGFRNONAA in the last 48 hours. and CBC No results for input(s): WBC, HGB, HCT, PLT in the last 48 hours.    Pending Diagnostic Studies:     Procedure Component Value Units Date/Time    Specimen to Pathology, Surgery Gastrointestinal tract [[id number]] Collected: 11/05/20 1836    Order Status: Sent Lab Status: In process Updated: 11/06/20 1001        Final Active Diagnoses:    Diagnosis Date Noted POA    PRINCIPAL PROBLEM:  Ulcerative colitis [K51.90] 06/11/2020 Yes      Problems Resolved During this Admission:      Discharged Condition: good    Disposition: Home or Self Care    Follow Up:  Follow-up Information     Delia Mehta MD In 2 weeks.    Specialty: Colon and Rectal Surgery  Contact information:  [address]  [facility] [address]  [phone]             MARSHALL Lao In 2 weeks.    Specialties: Colon and Rectal Surgery, Wound Care  Contact information:  [address] [facility] [address]  [phone]                 Patient Instructions:      Diet Adult Regular     Lifting restrictions   Order Comments: No lifting of objects heavier than 10lbs for 6 weeks.     No driving until:   Order Comments: Off of pain medications     Other restrictions (specify):   Order Comments: OK for baths, showers immediately upon discharge  Multi-modal pain control with gabapentin, advil, tylenol, and oxycodon  Try to wean narcotic pain medications over next few days  Monitor ostomy output on daily basis  Maintain hydration based off of ostomy output     Notify your health care provider if you experience any of the following:  temperature >100.4      Notify your health care provider if you experience any of the following:  severe uncontrolled pain     Notify your health care provider if you experience any of the following:  persistent nausea and vomiting or diarrhea     Notify your health care provider if you experience any of the following:  redness, tenderness, or signs of infection (pain, swelling, redness, odor or green/yellow discharge around incision site)     Notify your health care provider if you experience any of the following:  difficulty breathing or increased cough     Notify your health care provider if you experience any of the following:  severe persistent headache     Notify your health care provider if you experience any of the following:  persistent dizziness, light-headedness, or visual disturbances     No dressing needed     Activity as tolerated     Medications:  Reconciled Home Medications:      Medication List      START taking these medications    acetaminophen 500 MG tablet  Commonly known as: TYLENOL  Take 2 tablets (1,000 mg total) by mouth every 8 (eight) hours.     gabapentin 300 MG capsule  Commonly known as: NEURONTIN  Take 1 capsule (300 mg total) by mouth 3 (three) times daily.     ibuprofen 800 MG tablet  Commonly known as: ADVIL,MOTRIN  Take 1 tablet (800 mg total) by mouth every 8 (eight) hours.     loperamide 2 mg capsule  Commonly known as: IMODIUM  Take 1 capsule (2 mg total) by mouth 2 (two) times a day.        CHANGE how you take these medications    oxyCODONE 5 MG immediate release tablet  Commonly known as: ROXICODONE  Take 1 tablet (5 mg total) by mouth every 6 (six) hours as needed for Pain.  What changed:   · medication strength  · how much to take  · when to take this        CONTINUE taking these medications    balsalazide 750 mg capsule  Commonly known as: COLAZAL  Take 2,250 mg by mouth 3 (three) times daily.     dicyclomine 20 mg tablet  Commonly known as: BENTYL  Take 20 mg by mouth every 6 (six) hours.      hydrocortisone-pramoxine rectal foam  Commonly known as: PROCTOFOAM-HS  Place 1 applicator rectally 2 (two) times daily.     mesalamine 1000 MG Supp  Commonly known as: CANASA  Place 1 suppository (1,000 mg total) rectally nightly.     ondansetron 4 MG tablet  Commonly known as: ZOFRAN  Take 1 tablet (4 mg total) by mouth every 6 (six) hours as needed for Nausea.     predniSONE 5 MG tablet  Commonly known as: DELTASONE  Take 3 tablets daily starting on 6/20/20 and ending on 6/26/20. Take 2 tables daily starting on 6/27/20 and ending on 7/3/20. Take 1 tablet daily starting on 7/4/20 and ending on 7/10/20. Discontinue prednisone on 7/11/20.     promethazine 25 MG tablet  Commonly known as: PHENERGAN  Take 25 mg by mouth every 6 (six) hours as needed for Nausea.            Prince Pendleton MD  Colorectal Surgery  [facility]

## 2020-11-09 NOTE — HOSPITAL COURSE
Alissa Mitchell underwent a completion proctectomy with ileal-pouch anal anastomosis, diverting loop ileostomy on 11/5/20. The patient progressed appropriately post-operatively. She began tolerating a diet on POD1. Patient with ileostomy function on POD2. Slight urinary retention post-operatively that resolved with tamulosin. The patient is ambulating halls, pain well controlled on oral pain medications, ileostomy output well controlled with loperamide. The patient endorses a desire to go home. Rectal drain removed prior to discharge.

## 2020-11-09 NOTE — PLAN OF CARE
CM contacted by treatment team, pt medically ready to discharge.    Plan: D/C home with follow-up appointments.    CM assisting team with discharge needs.      Yamilka Abdi RN  PRN  - [facility]  [phone]

## 2020-11-09 NOTE — PLAN OF CARE
Follow up with Delia Mehta MD in 2 week(s)  Staff will contact patient with appt details. [address]   [facility] [address]  [phone]   Follow up with MARSHALL Lao in 2 week(s)  Staff will contact patient with appt details.    [address]   [facility] [address]  [phone]        11/09/20 1414   Final Note   Assessment Type Final Discharge Note   Anticipated Discharge Disposition Home   Hospital Follow Up  Appt(s) scheduled? Yes   Right Care Referral Info   Post Acute Recommendation No Care       Yamilka Abdi RN  PRN  - [facility]  [phone]

## 2020-11-09 NOTE — CONSULTS
Ostomy consult received on patient's loop ileostomy, POD 4. Han removed by primary team yesterday.  Patient sitting up in bed. Patient denied any needs from ostomy nurse. Patient has self-cared for her ostomy prior to recent loop ileo. Patient familiar with diet, hydration, and monitoring output. Patient currently wearing her own ostomy supplies, convatec pouches. Patient denied any ostomy needs or questions, reports having output and currently on imodium scheduled twice a day.  No needs from ostomy nurse at this time. Nursing to continue care. Ostomy dept to assist as needed.

## 2020-11-09 NOTE — HPI
Alissa Mitchell is a 26yo female with ulcerative colitis who presents to the hospital for an elective completion proctectomy with ileal-pouch anal anastomosis, diverting loop ileostomy.     Her history is as follows:  Alissa Mitchell is a 25 y.o. female who presents following laparoscopic total colectomy for medically refractory UC on 6/11/2020. Their post-operative course was uncomplicated.   Overall she has done very well with her ileostomy.  Takes Imodium every 2-3 days as needed.  Had some skin reaction to the appliance which has improved with some changes in how she applies it and switching to a different company.  Occasionally has discharge from her rectum, which she knows is normal.     She comes in today to discuss planning for J pouch.  She is interested in proceeding with this.  She has done a large amount of independent research to educate herself on J pouch function and is currently involved in several support groups.  Is off of steroids.  Feels that she is almost completely back to her normal functional status.

## 2020-11-09 NOTE — PLAN OF CARE
PCP: Tatiana Nolen MD ([facility])    Payor: Lucinda Fam ([id number])    Pharmacy: [facility] [address], [place] [address]  [phone]    SW visited pt bs to complete assessment. Alert and oriented. Lives with SO who provides care as needed. 5 steps to enter home. Stated no use of DME prior to admit. No dialysis or coumadin. Stated that she will need 3 hour notice prior to d/c to alert her ride.     No further needs at time of assessment.       11/08/20 1806   Discharge Assessment   Assessment Type Discharge Planning Assessment   Confirmed/corrected address and phone number on facesheet? Yes   Assessment information obtained from? Patient   Communicated expected length of stay with patient/caregiver no  (not yet determined)   Prior to hospitilization cognitive status: Alert/Oriented   Prior to hospitalization functional status: Independent   Current cognitive status: Alert/Oriented   Current Functional Status: Independent   Facility Arrived From: home   Lives With significant other   Able to Return to Prior Arrangements yes   Is patient able to care for self after discharge? Unable to determine at this time (comments)   Who are your caregiver(s) and their phone number(s)? SO: Sven Solomon; [phone]   Patient's perception of discharge disposition home or selfcare   Readmission Within the Last 30 Days unable to assess   Patient currently being followed by outpatient case management? Unable to determine (comments)   Patient currently receives any other outside agency services? No   Equipment Currently Used at Home none   Do you have any problems affording any of your prescribed medications? No   Is the patient taking medications as prescribed? yes   Does the patient have transportation home? Yes   Transportation Anticipated family or friend will provide   Dialysis Name and Scheduled days n/a   Does the patient receive services at the Coumadin Clinic? No   Discharge Plan A Home    Discharge Plan B Home Health   DME Needed Upon Discharge    (not yet determined)   Patient/Family in Agreement with Plan yes     Mikayla Jackson LMSW  Case Management Social Worker   [facility]

## 2020-11-09 NOTE — PLAN OF CARE
Pt is A&Ox4 injury free. Breathing is regular equal and unlabored bilaterally on room air. Pt did complain about discharge from the rectum and the on call Dr came to her room to see. The pt complained about a stitch that holds the tube from her rectum was causing her a lot of pain, the rounding Dr in the morning addressed the issue with her and told her they would talk  to Dr Mehta about it. Pain was controlled with oral pain medication. The Loperamide seems to have worked for the pt during the night because the volume slowed down from the night before.

## 2020-11-10 ENCOUNTER — TELEPHONE (OUTPATIENT)
Dept: SURGERY | Facility: CLINIC | Age: 25
End: 2020-11-10

## 2020-11-10 ENCOUNTER — NURSE TRIAGE (OUTPATIENT)
Dept: ADMINISTRATIVE | Facility: CLINIC | Age: 25
End: 2020-11-10

## 2020-11-10 ENCOUNTER — PATIENT MESSAGE (OUTPATIENT)
Dept: SURGERY | Facility: CLINIC | Age: 25
End: 2020-11-10

## 2020-11-10 RX ORDER — ONDANSETRON 4 MG/1
4 TABLET, ORALLY DISINTEGRATING ORAL EVERY 6 HOURS PRN
Qty: 20 TABLET | Refills: 0 | Status: SHIPPED | OUTPATIENT
Start: 2020-11-10 | End: 2021-02-22 | Stop reason: SDUPTHER

## 2020-11-10 NOTE — TELEPHONE ENCOUNTER
Post op Thurs. Last night pt was D/C'd. Throughout the entire night she suffered.  Every time she eats she gets super nauseated and throws up. Cannot get nausea under control. Advised go to ER per triage protocol, but pt wishes to speak with MD. Message sent to Dr Mehta and her staff. Advised pt messages sent but continued to recommend ER.    Reason for Disposition   Vomiting and persists > 4 hours    Additional Information   Negative: Sounds like a life-threatening emergency to the triager   Negative: Bright red, wide-spread, sunburn-like rash   Negative: SEVERE headache and after spinal (epidural) anesthesia    Protocols used: POST-OP SYMPTOMS AND [id number]-SO

## 2020-11-10 NOTE — TELEPHONE ENCOUNTER
Left message to return call regarding stopping the Imodium, Zofran has been called out, instructed to measure output and if she is still throwing up and not having output to go to the ED. Will message patient through the portal.

## 2020-11-10 NOTE — NURSING
Discharge instructions including follow up appointments reviewed with patient. Verbalized understanding. PIVs x2 d/c'd per order, catheter intact, patient tolerated well. Discharge medications delivered to bedside by pharmacy. AAO upon discharge.

## 2020-11-11 ENCOUNTER — HOSPITAL ENCOUNTER (INPATIENT)
Facility: HOSPITAL | Age: 25
LOS: 6 days | Discharge: HOME | DRG: 393 | End: 2020-11-17
Attending: STUDENT IN AN ORGANIZED HEALTH CARE EDUCATION/TRAINING PROGRAM | Admitting: STUDENT IN AN ORGANIZED HEALTH CARE EDUCATION/TRAINING PROGRAM
Payer: MEDICAID

## 2020-11-11 DIAGNOSIS — I81 PORTAL VEIN THROMBOSIS: Primary | ICD-10-CM

## 2020-11-11 DIAGNOSIS — K56.7 ILEUS: ICD-10-CM

## 2020-11-11 LAB
ABO + RH BLD: NORMAL
ANION GAP SERPL CALC-SCNC: 13 MMOL/L (ref 8–16)
APTT BLDCRRT: 27.3 SEC (ref 21–32)
APTT BLDCRRT: 35.1 SEC (ref 21–32)
APTT BLDCRRT: 40.7 SEC (ref 21–32)
BASOPHILS # BLD AUTO: 0.03 K/UL (ref 0–0.2)
BASOPHILS NFR BLD: 0.3 % (ref 0–1.9)
BLD GP AB SCN CELLS X3 SERPL QL: NORMAL
BUN SERPL-MCNC: 8 MG/DL (ref 6–20)
CALCIUM SERPL-MCNC: 9 MG/DL (ref 8.7–10.5)
CHLORIDE SERPL-SCNC: 103 MMOL/L (ref 95–110)
CO2 SERPL-SCNC: 22 MMOL/L (ref 23–29)
CREAT SERPL-MCNC: 0.7 MG/DL (ref 0.5–1.4)
CTP QC/QA: YES
DIFFERENTIAL METHOD: ABNORMAL
EOSINOPHIL # BLD AUTO: 0 K/UL (ref 0–0.5)
EOSINOPHIL NFR BLD: 0.2 % (ref 0–8)
ERYTHROCYTE [DISTWIDTH] IN BLOOD BY AUTOMATED COUNT: 13.9 % (ref 11.5–14.5)
EST. GFR  (AFRICAN AMERICAN): >60 ML/MIN/1.73 M^2
EST. GFR  (NON AFRICAN AMERICAN): >60 ML/MIN/1.73 M^2
FINAL PATHOLOGIC DIAGNOSIS: NORMAL
GLUCOSE SERPL-MCNC: 93 MG/DL (ref 70–110)
HCT VFR BLD AUTO: 33.1 % (ref 37–48.5)
HGB BLD-MCNC: 10.2 G/DL (ref 12–16)
IMM GRANULOCYTES # BLD AUTO: 0.03 K/UL (ref 0–0.04)
IMM GRANULOCYTES NFR BLD AUTO: 0.3 % (ref 0–0.5)
INR PPP: 1 (ref 0.8–1.2)
LACTATE SERPL-SCNC: 1.1 MMOL/L (ref 0.5–2.2)
LYMPHOCYTES # BLD AUTO: 0.9 K/UL (ref 1–4.8)
LYMPHOCYTES NFR BLD: 8.8 % (ref 18–48)
Lab: NORMAL
MCH RBC QN AUTO: 26.7 PG (ref 27–31)
MCHC RBC AUTO-ENTMCNC: 30.8 G/DL (ref 32–36)
MCV RBC AUTO: 87 FL (ref 82–98)
MONOCYTES # BLD AUTO: 0.5 K/UL (ref 0.3–1)
MONOCYTES NFR BLD: 4.5 % (ref 4–15)
NEUTROPHILS # BLD AUTO: 8.7 K/UL (ref 1.8–7.7)
NEUTROPHILS NFR BLD: 85.9 % (ref 38–73)
NRBC BLD-RTO: 0 /100 WBC
PLATELET # BLD AUTO: 306 K/UL (ref 150–350)
PMV BLD AUTO: 11.6 FL (ref 9.2–12.9)
POTASSIUM SERPL-SCNC: 4.8 MMOL/L (ref 3.5–5.1)
PROTHROMBIN TIME: 10.6 SEC (ref 9–12.5)
RBC # BLD AUTO: 3.82 M/UL (ref 4–5.4)
SARS-COV-2 RDRP RESP QL NAA+PROBE: NEGATIVE
SODIUM SERPL-SCNC: 138 MMOL/L (ref 136–145)
WBC # BLD AUTO: 10.12 K/UL (ref 3.9–12.7)

## 2020-11-11 PROCEDURE — 36415 COLL VENOUS BLD VENIPUNCTURE: CPT

## 2020-11-11 PROCEDURE — 85730 THROMBOPLASTIN TIME PARTIAL: CPT | Mod: 91

## 2020-11-11 PROCEDURE — 11000001 HC ACUTE MED/SURG PRIVATE ROOM

## 2020-11-11 PROCEDURE — U0002 COVID-19 LAB TEST NON-CDC: HCPCS | Performed by: STUDENT IN AN ORGANIZED HEALTH CARE EDUCATION/TRAINING PROGRAM

## 2020-11-11 PROCEDURE — 85025 COMPLETE CBC W/AUTO DIFF WBC: CPT

## 2020-11-11 PROCEDURE — 81403 MOPATH PROCEDURE LEVEL 4: CPT

## 2020-11-11 PROCEDURE — 81219 CALR GENE COM VARIANTS: CPT

## 2020-11-11 PROCEDURE — 99284 EMERGENCY DEPT VISIT MOD MDM: CPT | Mod: ,,, | Performed by: PHYSICIAN ASSISTANT

## 2020-11-11 PROCEDURE — 80048 BASIC METABOLIC PNL TOTAL CA: CPT

## 2020-11-11 PROCEDURE — 96376 TX/PRO/DX INJ SAME DRUG ADON: CPT

## 2020-11-11 PROCEDURE — 99285 EMERGENCY DEPT VISIT HI MDM: CPT | Mod: 25

## 2020-11-11 PROCEDURE — 96366 THER/PROPH/DIAG IV INF ADDON: CPT

## 2020-11-11 PROCEDURE — 25000003 PHARM REV CODE 250: Performed by: STUDENT IN AN ORGANIZED HEALTH CARE EDUCATION/TRAINING PROGRAM

## 2020-11-11 PROCEDURE — 99284 PR EMERGENCY DEPT VISIT,LEVEL IV: ICD-10-PCS | Mod: ,,, | Performed by: PHYSICIAN ASSISTANT

## 2020-11-11 PROCEDURE — 96365 THER/PROPH/DIAG IV INF INIT: CPT

## 2020-11-11 PROCEDURE — 86356 MONONUCLEAR CELL ANTIGEN: CPT

## 2020-11-11 PROCEDURE — 81270 JAK2 GENE: CPT

## 2020-11-11 PROCEDURE — 86920 COMPATIBILITY TEST SPIN: CPT

## 2020-11-11 PROCEDURE — 83605 ASSAY OF LACTIC ACID: CPT

## 2020-11-11 PROCEDURE — 63600175 PHARM REV CODE 636 W HCPCS: Performed by: STUDENT IN AN ORGANIZED HEALTH CARE EDUCATION/TRAINING PROGRAM

## 2020-11-11 PROCEDURE — 85610 PROTHROMBIN TIME: CPT

## 2020-11-11 PROCEDURE — 85730 THROMBOPLASTIN TIME PARTIAL: CPT

## 2020-11-11 PROCEDURE — 99232 PR SUBSEQUENT HOSPITAL CARE,LEVL II: ICD-10-PCS | Mod: ,,, | Performed by: INTERNAL MEDICINE

## 2020-11-11 PROCEDURE — 96375 TX/PRO/DX INJ NEW DRUG ADDON: CPT

## 2020-11-11 PROCEDURE — 86901 BLOOD TYPING SEROLOGIC RH(D): CPT

## 2020-11-11 PROCEDURE — 99232 SBSQ HOSP IP/OBS MODERATE 35: CPT | Mod: ,,, | Performed by: INTERNAL MEDICINE

## 2020-11-11 RX ORDER — ONDANSETRON 2 MG/ML
4 INJECTION INTRAMUSCULAR; INTRAVENOUS EVERY 8 HOURS PRN
Status: DISCONTINUED | OUTPATIENT
Start: 2020-11-11 | End: 2020-11-17 | Stop reason: HOSPADM

## 2020-11-11 RX ORDER — MORPHINE SULFATE 2 MG/ML
1 INJECTION, SOLUTION INTRAMUSCULAR; INTRAVENOUS EVERY 4 HOURS PRN
Status: DISCONTINUED | OUTPATIENT
Start: 2020-11-11 | End: 2020-11-14

## 2020-11-11 RX ORDER — ACETAMINOPHEN 325 MG/1
650 TABLET ORAL EVERY 4 HOURS PRN
Status: DISCONTINUED | OUTPATIENT
Start: 2020-11-11 | End: 2020-11-17 | Stop reason: HOSPADM

## 2020-11-11 RX ORDER — PROCHLORPERAZINE EDISYLATE 5 MG/ML
5 INJECTION INTRAMUSCULAR; INTRAVENOUS EVERY 6 HOURS PRN
Status: DISCONTINUED | OUTPATIENT
Start: 2020-11-11 | End: 2020-11-17 | Stop reason: HOSPADM

## 2020-11-11 RX ORDER — MORPHINE SULFATE 2 MG/ML
2 INJECTION, SOLUTION INTRAMUSCULAR; INTRAVENOUS EVERY 4 HOURS PRN
Status: DISCONTINUED | OUTPATIENT
Start: 2020-11-11 | End: 2020-11-14

## 2020-11-11 RX ORDER — HEPARIN SODIUM,PORCINE/D5W 25000/250
18 INTRAVENOUS SOLUTION INTRAVENOUS CONTINUOUS
Status: DISCONTINUED | OUTPATIENT
Start: 2020-11-11 | End: 2020-11-13

## 2020-11-11 RX ORDER — SODIUM CHLORIDE 0.9 % (FLUSH) 0.9 %
10 SYRINGE (ML) INJECTION
Status: DISCONTINUED | OUTPATIENT
Start: 2020-11-11 | End: 2020-11-17 | Stop reason: HOSPADM

## 2020-11-11 RX ORDER — NALOXONE HCL 0.4 MG/ML
0.02 VIAL (ML) INJECTION
Status: DISCONTINUED | OUTPATIENT
Start: 2020-11-11 | End: 2020-11-17 | Stop reason: HOSPADM

## 2020-11-11 RX ORDER — GABAPENTIN 300 MG/1
300 CAPSULE ORAL 3 TIMES DAILY
Status: DISCONTINUED | OUTPATIENT
Start: 2020-11-11 | End: 2020-11-17 | Stop reason: HOSPADM

## 2020-11-11 RX ADMIN — HEPARIN SODIUM AND DEXTROSE 20 UNITS/KG/HR: 10000; 5 INJECTION INTRAVENOUS at 03:11

## 2020-11-11 RX ADMIN — GABAPENTIN 300 MG: 300 CAPSULE ORAL at 09:11

## 2020-11-11 RX ADMIN — MORPHINE SULFATE 2 MG: 2 INJECTION, SOLUTION INTRAMUSCULAR; INTRAVENOUS at 07:11

## 2020-11-11 RX ADMIN — MORPHINE SULFATE 2 MG: 2 INJECTION, SOLUTION INTRAMUSCULAR; INTRAVENOUS at 04:11

## 2020-11-11 RX ADMIN — MORPHINE SULFATE 2 MG: 2 INJECTION, SOLUTION INTRAMUSCULAR; INTRAVENOUS at 10:11

## 2020-11-11 RX ADMIN — GABAPENTIN 300 MG: 300 CAPSULE ORAL at 02:11

## 2020-11-11 RX ADMIN — PROCHLORPERAZINE EDISYLATE 5 MG: 5 INJECTION INTRAMUSCULAR; INTRAVENOUS at 07:11

## 2020-11-11 RX ADMIN — PROCHLORPERAZINE EDISYLATE 5 MG: 5 INJECTION INTRAMUSCULAR; INTRAVENOUS at 12:11

## 2020-11-11 RX ADMIN — HEPARIN SODIUM AND DEXTROSE 18 UNITS/KG/HR: 10000; 5 INJECTION INTRAVENOUS at 06:11

## 2020-11-11 RX ADMIN — GABAPENTIN 300 MG: 300 CAPSULE ORAL at 07:11

## 2020-11-11 RX ADMIN — ACETAMINOPHEN 650 MG: 325 TABLET ORAL at 12:11

## 2020-11-11 NOTE — CONSULTS
Consult Note                                         Inpatient consult to Hematology/Oncology  Consult performed by: Carlos Mcghee MD  Consult ordered by: Prince Pendleton MD        SUBJECTIVE:     History of Present Illness:    Ms. Mitchell is a 25-year-old female with a past medical history of refractory ulcerative colitis status post colectomy with end ileostomy on June 11, 2020, proctectomy on November 5, 2020 presented to the hospital with abdominal pain and nausea    Patient had proctectomy on November 5, 2020 and discharged from the hospital with Imodium b.i.d. for high ostomy output.  Over the next day she complained of nausea, vomiting and abdominal pain and was seen at an outside hospital and a CT imaging revealed ileus and right portal vein thrombosis and was started on heparin drip.  She was transferred to [facility] ER for higher level of care.    Hematology consulted for further evaluation and treatment of portal vein thrombosis    Review of patient's allergies indicates:  No Known Allergies  Past Medical History:   Diagnosis Date    ADD (attention deficit disorder)     Anxiety     Chronic anemia     Murmur     Ulcerative colitis      Past Surgical History:   Procedure Laterality Date    LAPAROSCOPIC PROCTOCOLECTOMY WITH ILEOANAL ANASTOMOSIS, CREATION OF ILEAL POUCH, AND ILEOSTOMY N/A 11/5/2020    Procedure: PROCTECTOMY, LAPAROSCOPIC, WITH ILEOANAL ANASTOMOSIS, ILEAL POUCH CREATION, AND ILEOSTOMY CREATION, ERAS low;  Surgeon: Delia Mehta MD;  Location: [facility] OR [address] [place] [address];  Service: Colon and Rectal;  Laterality: N/A;    LAPAROSCOPIC TOTAL COLECTOMY N/A 6/11/2020    Procedure: COLECTOMY, TOTAL, LAPAROSCOPIC, ERAS high and total abdominal colectomy;  Surgeon: Delia Mehta MD;  Location: [facility] OR [address] [place] [address];  Service: Colon and Rectal;  Laterality: N/A;     History reviewed. No pertinent family  history.  Social History     Tobacco Use    Smoking status: Never Smoker    Smokeless tobacco: Never Used   Substance Use Topics    Alcohol use: Not on file    Drug use: Not on file     Review of Systems   Constitutional: Positive for malaise/fatigue. Negative for weight loss.   HENT: Negative for nosebleeds.    Respiratory: Negative for hemoptysis and sputum production.    Cardiovascular: Negative for chest pain and leg swelling.   Gastrointestinal: Positive for abdominal pain, nausea and vomiting.   Genitourinary: Negative for dysuria.   Musculoskeletal: Negative for back pain.   Neurological: Negative for sensory change, focal weakness and headaches.   Psychiatric/Behavioral: Negative for suicidal ideas.     OBJECTIVE:     Vital Signs:  Temp:  [99 °F (37.2 °C)]   Pulse:  []   Resp:  [14-18]   BP: (105-131)/(67-86)   SpO2:  [97 %-99 %]     Physical Exam  Constitutional:       Appearance: Normal appearance.   HENT:      Head: Normocephalic and atraumatic.      Nose: Nose normal.      Mouth/Throat:      Mouth: Mucous membranes are moist.   Eyes:      Extraocular Movements: Extraocular movements intact.      Pupils: Pupils are equal, round, and reactive to light.   Cardiovascular:      Rate and Rhythm: Normal rate and regular rhythm.   Pulmonary:      Effort: Pulmonary effort is normal.      Breath sounds: Normal breath sounds.   Abdominal:      General: Abdomen is flat.      Tenderness: There is abdominal tenderness. There is no guarding.   Musculoskeletal: Normal range of motion.         General: No tenderness.   Skin:     General: Skin is warm and dry.      Capillary Refill: Capillary refill takes less than 2 seconds.   Neurological:      General: No focal deficit present.      Mental Status: She is alert and oriented to person, place, and time.   Psychiatric:         Mood and Affect: Mood normal.       Laboratory:  CBC:   Recent Labs   Lab 11/11/20  0534   WBC 10.12   RBC 3.82*   HGB 10.2*   HCT 33.1*       MCV 87   MCH 26.7*   MCHC 30.8*     CMP:   Recent Labs   Lab 11/11/20  0534   GLU 93   CALCIUM 9.0      K 4.8   CO2 22*      BUN 8   CREATININE 0.7     Coagulation:   Recent Labs   Lab 11/11/20  0534 11/11/20  1251   LABPROT 10.6  --    INR 1.0  --    APTT 27.3 35.1*     The pathology    Diagnostic Results:      ASSESSMENT/PLAN:     1. Portal vein thrombosis:  A portal vein thrombosis is likely provoked in view of recent history of surgery a week ago.  However would like to rule out myeloproliferative neoplasms, proximal nocturnal hemoglobinuria which is a low probability at this time    2. Ulcerative colitis status post total colectomy on June 11, 2020 and recent completion proctectomy on November 5, 2020    Plan:   1. Recommend to get MPN panel, PNH workup- labs ordered  2. Patient currently on heparin can be transitioned to DOAC- Eliquis or Xarelto for a total of 6 months.   3. Recommend to follow up with Hematology as outpatient    Plan of care discussed with Dr. Daniel Mcghee MD PGY 6  Hematology/Oncology load of was positive

## 2020-11-11 NOTE — ED TRIAGE NOTES
Alissa Mitchell, a 25 y.o. female presents to the ED w/ complaint of tx from [facility] for colorectal for portal vein thrombosis and arrived on heparin drip. On 11/5 colostomy was placed.     Triage note:  Chief Complaint   Patient presents with    [facility] tx     Pt transfered for portal vein thrombosis and on heparin drip. She had a colostomy placed 11/5.     Review of patient's allergies indicates:  No Known Allergies  Past Medical History:   Diagnosis Date    ADD (attention deficit disorder)     Anxiety     Chronic anemia     Murmur     Ulcerative colitis      LOC: The patient is awake, alert, and oriented to place, time, situation. Affect is appropriate.  Speech is appropriate and clear.   APPEARANCE: Patient resting comfortably in no acute distress.  Patient is clean and well groomed.  SKIN: The skin is warm and dry; color consistent with ethnicity.  Patient has normal skin turgor and moist mucus membranes.  Skin intact; no breakdown or bruising noted.   MUSCULOSKELETAL: Patient moving upper and lower extremities without difficulty.  Denies weakness.   RESPIRATORY: Airway is open and patent. Respirations spontaneous, even, easy, and non-labored.  Patient has a normal effort and rate.  No accessory muscle use noted. Denies cough.   CARDIAC:  Normal rhythm and rate noted.  No peripheral edema noted. No complaints of chest pain.    ABDOMEN: Pt reports nausea.  No distention noted. Colostomy bag noted.   NEUROLOGIC: Eyes open spontaneously.  Behavior appropriate to situation.  Follows commands; facial expression symmetrical.  Purposeful motor response noted; normal sensation in all extremities.

## 2020-11-11 NOTE — ED PROVIDER NOTES
Encounter Date: 11/11/2020       History     Chief Complaint   Patient presents with    [facility]     Pt transfered for portal vein thrombosis and on heparin drip. She had a colostomy placed 11/5.     25-year-old female presents to the ER as a transfer for higher level of care and consultation with Colon and Rectal surgery.  Patient has a history of ulcerative colitis.  Last week she underwent  a completion proctectomy with ileal-pouch anal anastomosis, diverting loop ileostomy on 11/5/20.     She presented to the outside facility last night with nausea vomiting and abdominal pain.  Workup at the outside facility workup revealed portal vein thrombus and she was started on heparin.    Upon arrival to our facility she is on heparin at 8 units an hour.  She is still complaining of abdominal pain with nausea and vomiting.        Review of patient's allergies indicates:  No Known Allergies  Past Medical History:   Diagnosis Date    ADD (attention deficit disorder)     Anxiety     Chronic anemia     Murmur     Ulcerative colitis      Past Surgical History:   Procedure Laterality Date    LAPAROSCOPIC PROCTOCOLECTOMY WITH ILEOANAL ANASTOMOSIS, CREATION OF ILEAL POUCH, AND ILEOSTOMY N/A 11/5/2020    Procedure: PROCTECTOMY, LAPAROSCOPIC, WITH ILEOANAL ANASTOMOSIS, ILEAL POUCH CREATION, AND ILEOSTOMY CREATION, ERAS low;  Surgeon: Delia Mehta MD;  Location: [facility] OR [address] [place] [address];  Service: Colon and Rectal;  Laterality: N/A;    LAPAROSCOPIC TOTAL COLECTOMY N/A 6/11/2020    Procedure: COLECTOMY, TOTAL, LAPAROSCOPIC, ERAS high and total abdominal colectomy;  Surgeon: Delia Mehta MD;  Location: [facility] OR [address] [place] [address];  Service: Colon and Rectal;  Laterality: N/A;     No family history on file.  Social History     Tobacco Use    Smoking status: Never Smoker    Smokeless tobacco: Never Used   Substance Use Topics    Alcohol use: Not on file    Drug use: Not on file     Review of Systems   Constitutional:  Negative for fever.   HENT: Negative for sore throat.    Respiratory: Negative for shortness of breath.    Cardiovascular: Negative for chest pain.   Gastrointestinal: Positive for nausea and vomiting.   Genitourinary: Negative for dysuria.   Musculoskeletal: Negative for back pain.   Skin: Negative for rash.   Neurological: Negative for weakness.   Hematological: Does not bruise/bleed easily.       Physical Exam     Initial Vitals   BP Pulse Resp Temp SpO2   11/11/20 0507 11/11/20 0507 11/11/20 0507 11/11/20 0516 11/11/20 0507   105/67 95 14 99 °F (37.2 °C) 97 %      MAP       --                Physical Exam    Constitutional: Vital signs are normal. She appears well-developed and well-nourished. She is not diaphoretic. No distress.   Uncomfortable appearing   HENT:   Head: Normocephalic and atraumatic.   Right Ear: Hearing normal.   Left Ear: Hearing normal.   Eyes: Conjunctivae are normal.   Cardiovascular: Normal rate and regular rhythm.   Abdominal: Soft. Normal appearance and bowel sounds are normal.   Diffusely tender abdomen   Musculoskeletal: Normal range of motion.   Neurological: She is alert and oriented to person, place, and time.   Skin: Skin is warm and intact.   Psychiatric: She has a normal mood and affect. Her speech is normal and behavior is normal. Cognition and memory are normal.         ED Course   Procedures  Labs Reviewed   CBC W/ AUTO DIFFERENTIAL   BASIC METABOLIC PANEL   LACTIC ACID, PLASMA   APTT   PROTIME-INR   TYPE & SCREEN          Imaging Results    None          Medical Decision Making:   History:   Old Medical Records: I decided to obtain old medical records.  Old Records Summarized: records from another hospital.       <> Summary of Records: Portal vein thrombus  Initial Assessment:   25-year-old female presenting for CRS consultation  Differential Diagnosis:   Mesenteric ischemia, lactic acidosis, thrombus, bowel obstruction, ileus  Clinical Tests:   Lab Tests: Ordered and  Reviewed  ED Management:  Plan:  Case discussed with Colorectal surgery.   I will get coagulation studies, routine labs and lactic acid.  Supportive care, fluids and anticipate admission  Other:   I discussed test(s) with the performing physician.  I have discussed this case with another health care provider.                             Clinical Impression:       ICD-10-CM ICD-9-CM   1. Portal vein thrombosis  I81 452   2. Ileus  K56.7 560.1                      Disposition:   Disposition: Admitted  Condition: Stable     ED Disposition Condition    Admit                             Heriberto Lee PA-C  11/11/20 0521

## 2020-11-12 LAB
ALBUMIN SERPL BCP-MCNC: 3.2 G/DL (ref 3.5–5.2)
ALP SERPL-CCNC: 38 U/L (ref 55–135)
ALT SERPL W/O P-5'-P-CCNC: 73 U/L (ref 10–44)
ANION GAP SERPL CALC-SCNC: 11 MMOL/L (ref 8–16)
APTT BLDCRRT: 69.1 SEC (ref 21–32)
AST SERPL-CCNC: 40 U/L (ref 10–40)
BASOPHILS # BLD AUTO: 0.03 K/UL (ref 0–0.2)
BASOPHILS # BLD AUTO: 0.04 K/UL (ref 0–0.2)
BASOPHILS # BLD AUTO: 0.06 K/UL (ref 0–0.2)
BASOPHILS NFR BLD: 0.2 % (ref 0–1.9)
BASOPHILS NFR BLD: 0.3 % (ref 0–1.9)
BASOPHILS NFR BLD: 0.5 % (ref 0–1.9)
BILIRUB SERPL-MCNC: 0.4 MG/DL (ref 0.1–1)
BLD PROD TYP BPU: NORMAL
BLOOD UNIT EXPIRATION DATE: NORMAL
BLOOD UNIT TYPE CODE: 5100
BLOOD UNIT TYPE: NORMAL
BUN SERPL-MCNC: 14 MG/DL (ref 6–20)
CALCIUM SERPL-MCNC: 8.4 MG/DL (ref 8.7–10.5)
CHLORIDE SERPL-SCNC: 99 MMOL/L (ref 95–110)
CO2 SERPL-SCNC: 22 MMOL/L (ref 23–29)
CODING SYSTEM: NORMAL
CREAT SERPL-MCNC: 0.7 MG/DL (ref 0.5–1.4)
CRP SERPL-MCNC: 90.8 MG/L (ref 0–8.2)
DIFFERENTIAL METHOD: ABNORMAL
DISPENSE STATUS: NORMAL
EOSINOPHIL # BLD AUTO: 0 K/UL (ref 0–0.5)
EOSINOPHIL # BLD AUTO: 0 K/UL (ref 0–0.5)
EOSINOPHIL # BLD AUTO: 0.2 K/UL (ref 0–0.5)
EOSINOPHIL NFR BLD: 0 % (ref 0–8)
EOSINOPHIL NFR BLD: 0.1 % (ref 0–8)
EOSINOPHIL NFR BLD: 1.1 % (ref 0–8)
ERYTHROCYTE [DISTWIDTH] IN BLOOD BY AUTOMATED COUNT: 13.9 % (ref 11.5–14.5)
ERYTHROCYTE [DISTWIDTH] IN BLOOD BY AUTOMATED COUNT: 13.9 % (ref 11.5–14.5)
ERYTHROCYTE [DISTWIDTH] IN BLOOD BY AUTOMATED COUNT: 14 % (ref 11.5–14.5)
EST. GFR  (AFRICAN AMERICAN): >60 ML/MIN/1.73 M^2
EST. GFR  (NON AFRICAN AMERICAN): >60 ML/MIN/1.73 M^2
GLUCOSE SERPL-MCNC: 120 MG/DL (ref 70–110)
HCT VFR BLD AUTO: 21.2 % (ref 37–48.5)
HCT VFR BLD AUTO: 22.8 % (ref 37–48.5)
HCT VFR BLD AUTO: 23.3 % (ref 37–48.5)
HGB BLD-MCNC: 6.8 G/DL (ref 12–16)
HGB BLD-MCNC: 7.3 G/DL (ref 12–16)
HGB BLD-MCNC: 7.4 G/DL (ref 12–16)
IMM GRANULOCYTES # BLD AUTO: 0.11 K/UL (ref 0–0.04)
IMM GRANULOCYTES # BLD AUTO: 0.11 K/UL (ref 0–0.04)
IMM GRANULOCYTES # BLD AUTO: 0.12 K/UL (ref 0–0.04)
IMM GRANULOCYTES NFR BLD AUTO: 0.8 % (ref 0–0.5)
LYMPHOCYTES # BLD AUTO: 1.4 K/UL (ref 1–4.8)
LYMPHOCYTES # BLD AUTO: 1.6 K/UL (ref 1–4.8)
LYMPHOCYTES # BLD AUTO: 1.8 K/UL (ref 1–4.8)
LYMPHOCYTES NFR BLD: 10.7 % (ref 18–48)
LYMPHOCYTES NFR BLD: 12.2 % (ref 18–48)
LYMPHOCYTES NFR BLD: 12.5 % (ref 18–48)
MCH RBC QN AUTO: 26.5 PG (ref 27–31)
MCH RBC QN AUTO: 26.9 PG (ref 27–31)
MCH RBC QN AUTO: 27.8 PG (ref 27–31)
MCHC RBC AUTO-ENTMCNC: 31.3 G/DL (ref 32–36)
MCHC RBC AUTO-ENTMCNC: 32.1 G/DL (ref 32–36)
MCHC RBC AUTO-ENTMCNC: 32.5 G/DL (ref 32–36)
MCV RBC AUTO: 84 FL (ref 82–98)
MCV RBC AUTO: 85 FL (ref 82–98)
MCV RBC AUTO: 86 FL (ref 82–98)
MONOCYTES # BLD AUTO: 1.4 K/UL (ref 0.3–1)
MONOCYTES # BLD AUTO: 1.7 K/UL (ref 0.3–1)
MONOCYTES # BLD AUTO: 1.7 K/UL (ref 0.3–1)
MONOCYTES NFR BLD: 10.3 % (ref 4–15)
MONOCYTES NFR BLD: 11.7 % (ref 4–15)
MONOCYTES NFR BLD: 12.8 % (ref 4–15)
NEUTROPHILS # BLD AUTO: 11 K/UL (ref 1.8–7.7)
NEUTROPHILS # BLD AUTO: 9.9 K/UL (ref 1.8–7.7)
NEUTROPHILS # BLD AUTO: 9.9 K/UL (ref 1.8–7.7)
NEUTROPHILS NFR BLD: 74.8 % (ref 38–73)
NEUTROPHILS NFR BLD: 75.1 % (ref 38–73)
NEUTROPHILS NFR BLD: 75.3 % (ref 38–73)
NRBC BLD-RTO: 0 /100 WBC
PLATELET # BLD AUTO: 273 K/UL (ref 150–350)
PLATELET # BLD AUTO: 300 K/UL (ref 150–350)
PLATELET # BLD AUTO: 332 K/UL (ref 150–350)
PMV BLD AUTO: 10.5 FL (ref 9.2–12.9)
PMV BLD AUTO: 10.9 FL (ref 9.2–12.9)
PMV BLD AUTO: 11 FL (ref 9.2–12.9)
POTASSIUM SERPL-SCNC: 4.2 MMOL/L (ref 3.5–5.1)
PROT SERPL-MCNC: 6 G/DL (ref 6–8.4)
RBC # BLD AUTO: 2.53 M/UL (ref 4–5.4)
RBC # BLD AUTO: 2.66 M/UL (ref 4–5.4)
RBC # BLD AUTO: 2.75 M/UL (ref 4–5.4)
SODIUM SERPL-SCNC: 132 MMOL/L (ref 136–145)
TRANS ERYTHROCYTES VOL PATIENT: NORMAL ML
WBC # BLD AUTO: 13.09 K/UL (ref 3.9–12.7)
WBC # BLD AUTO: 13.21 K/UL (ref 3.9–12.7)
WBC # BLD AUTO: 14.65 K/UL (ref 3.9–12.7)

## 2020-11-12 PROCEDURE — 85730 THROMBOPLASTIN TIME PARTIAL: CPT

## 2020-11-12 PROCEDURE — 86140 C-REACTIVE PROTEIN: CPT

## 2020-11-12 PROCEDURE — P9021 RED BLOOD CELLS UNIT: HCPCS

## 2020-11-12 PROCEDURE — 25000003 PHARM REV CODE 250: Performed by: STUDENT IN AN ORGANIZED HEALTH CARE EDUCATION/TRAINING PROGRAM

## 2020-11-12 PROCEDURE — 63600175 PHARM REV CODE 636 W HCPCS: Performed by: STUDENT IN AN ORGANIZED HEALTH CARE EDUCATION/TRAINING PROGRAM

## 2020-11-12 PROCEDURE — 11000001 HC ACUTE MED/SURG PRIVATE ROOM

## 2020-11-12 PROCEDURE — 80053 COMPREHEN METABOLIC PANEL: CPT

## 2020-11-12 PROCEDURE — 36415 COLL VENOUS BLD VENIPUNCTURE: CPT

## 2020-11-12 PROCEDURE — 85025 COMPLETE CBC W/AUTO DIFF WBC: CPT

## 2020-11-12 PROCEDURE — 36430 TRANSFUSION BLD/BLD COMPNT: CPT

## 2020-11-12 RX ORDER — HYDROCODONE BITARTRATE AND ACETAMINOPHEN 500; 5 MG/1; MG/1
TABLET ORAL
Status: DISCONTINUED | OUTPATIENT
Start: 2020-11-12 | End: 2020-11-17 | Stop reason: HOSPADM

## 2020-11-12 RX ORDER — TAMSULOSIN HYDROCHLORIDE 0.4 MG/1
0.4 CAPSULE ORAL DAILY
Status: DISCONTINUED | OUTPATIENT
Start: 2020-11-12 | End: 2020-11-17 | Stop reason: HOSPADM

## 2020-11-12 RX ADMIN — TAMSULOSIN HYDROCHLORIDE 0.4 MG: 0.4 CAPSULE ORAL at 05:11

## 2020-11-12 RX ADMIN — GABAPENTIN 300 MG: 300 CAPSULE ORAL at 04:11

## 2020-11-12 RX ADMIN — MORPHINE SULFATE 2 MG: 2 INJECTION, SOLUTION INTRAMUSCULAR; INTRAVENOUS at 02:11

## 2020-11-12 RX ADMIN — GABAPENTIN 300 MG: 300 CAPSULE ORAL at 08:11

## 2020-11-12 RX ADMIN — MORPHINE SULFATE 2 MG: 2 INJECTION, SOLUTION INTRAMUSCULAR; INTRAVENOUS at 07:11

## 2020-11-12 RX ADMIN — MORPHINE SULFATE 2 MG: 2 INJECTION, SOLUTION INTRAMUSCULAR; INTRAVENOUS at 06:11

## 2020-11-12 RX ADMIN — HEPARIN SODIUM AND DEXTROSE 17.93 UNITS/KG/HR: 10000; 5 INJECTION INTRAVENOUS at 05:11

## 2020-11-12 RX ADMIN — MORPHINE SULFATE 2 MG: 2 INJECTION, SOLUTION INTRAMUSCULAR; INTRAVENOUS at 10:11

## 2020-11-12 RX ADMIN — ACETAMINOPHEN 650 MG: 325 TABLET ORAL at 04:11

## 2020-11-12 RX ADMIN — ACETAMINOPHEN 650 MG: 325 TABLET ORAL at 10:11

## 2020-11-12 RX ADMIN — MORPHINE SULFATE 1 MG: 2 INJECTION, SOLUTION INTRAMUSCULAR; INTRAVENOUS at 02:11

## 2020-11-12 RX ADMIN — GABAPENTIN 300 MG: 300 CAPSULE ORAL at 09:11

## 2020-11-12 NOTE — PLAN OF CARE
Plan of care reviewed with pt. Pt AAOx4, VS as charted. Patient on RA. Purposeful rounding for pt care and safety. No reports of NV, pain managed with PRN meds. No falls or injuries reported during this shift. Skin integrity as charted. Safety precautions maintained during shift- bed in low position, call light in reach, SR up x2, personal belongings in reach.          Problem: Fall Injury Risk  Goal: Absence of Fall and Fall-Related Injury  Outcome: Ongoing, Progressing  Intervention: Identify and Manage Contributors to Fall Injury Risk  Flowsheets (Taken 11/12/2020 0345)  Self-Care Promotion: BADL personal objects within reach  Medication Review/Management: medications reviewed  Intervention: Promote Injury-Free Environment  Flowsheets (Taken 11/12/2020 0345)  Safety Promotion/Fall Prevention:   instructed to call staff for mobility   side rails raised x 2   nonskid shoes/socks when out of bed   assistive device/personal item within reach  Environmental Safety Modification: assistive device/personal items within reach     Problem: Adult Inpatient Plan of Care  Goal: Plan of Care Review  Outcome: Ongoing, Progressing  Flowsheets (Taken 11/12/2020 0345)  Plan of Care Reviewed With: patient  Goal: Patient-Specific Goal (Individualization)  Outcome: Ongoing, Progressing  Goal: Absence of Hospital-Acquired Illness or Injury  Outcome: Ongoing, Progressing  Intervention: Identify and Manage Fall Risk  Flowsheets (Taken 11/12/2020 0345)  Safety Promotion/Fall Prevention:   instructed to call staff for mobility   side rails raised x 2   nonskid shoes/socks when out of bed   assistive device/personal item within reach  Intervention: Prevent VTE (venous thromboembolism)  Flowsheets (Taken 11/12/2020 0345)  VTE Prevention/Management: remove, assess skin and reapply sequential compression device  Goal: Optimal Comfort and Wellbeing  Outcome: Ongoing, Progressing  Intervention: Provide Person-Centered Care  Flowsheets (Taken  11/12/2020 0345)  Trust Relationship/Rapport: care explained  Goal: Readiness for Transition of Care  Outcome: Ongoing, Progressing  Goal: Rounds/Family Conference  Outcome: Ongoing, Progressing

## 2020-11-12 NOTE — PLAN OF CARE
Patient lives with Sven gomezRichard and 8 y/o daughter in a trailer with 5 entry steps. Patient does not feel she will need post acute services upon hospital discharge. Sven will provide transportation home.       11/12/20 1011   Discharge Assessment   Assessment Type Discharge Planning Assessment   Confirmed/corrected address and phone number on facesheet? Yes   Assessment information obtained from? Patient   Expected Length of Stay (days) 5   Communicated expected length of stay with patient/caregiver yes   Prior to hospitilization cognitive status: Alert/Oriented   Prior to hospitalization functional status: Independent   Current cognitive status: Alert/Oriented   Current Functional Status: Independent   Lives With significant other;child(darius), dependent   Able to Return to Prior Arrangements yes   Is patient able to care for self after discharge? Yes   Who are your caregiver(s) and their phone number(s)? Sven gomezRichard [phone]   Patient's perception of discharge disposition home or selfcare   Patient currently being followed by outpatient case management? No   Patient currently receives any other outside agency services? No   Equipment Currently Used at Home none   Do you have any problems affording any of your prescribed medications? No   Is the patient taking medications as prescribed? yes   Does the patient have transportation home? Yes   Transportation Anticipated family or friend will provide   Dialysis Name and Scheduled days n/a   Does the patient receive services at the [facility]? No   Discharge Plan A Home with family   Discharge Plan B Home with family   DME Needed Upon Discharge  none   Patient/Family in Agreement with Plan yes

## 2020-11-12 NOTE — SUBJECTIVE & OBJECTIVE
Subjective:     Interval History: Patient seen and examined this morning. States nausea, vomiting improved. Ileostomy functioning. Denies blood vomitus or bloody bowel movements. Heme/Onc consulted for anticoagulation management. Hgb drop from 10 to 7 this morning. No other complaints at this time.     Post-Op Info:  * No surgery found *          Medications:  Continuous Infusions:   heparin (porcine) in D5W 17.929 Units/kg/hr (11/12/20 0522)     Scheduled Meds:   gabapentin  300 mg Oral TID     PRN Meds:   acetaminophen    heparin (PORCINE)    heparin (PORCINE)    morphine    morphine    naloxone    ondansetron    prochlorperazine    sodium chloride 0.9%        Objective:     Vital Signs (Most Recent):  Temp: 99.4 °F (37.4 °C) (11/12/20 0459)  Pulse: (!) 120 (11/12/20 0459)  Resp: 16 (11/12/20 0626)  BP: 119/76 (11/12/20 0459)  SpO2: 99 % (11/12/20 0459) Vital Signs (24h Range):  Temp:  [97.6 °F (36.4 °C)-99.4 °F (37.4 °C)] 99.4 °F (37.4 °C)  Pulse:  [] 120  Resp:  [16-18] 16  SpO2:  [96 %-99 %] 99 %  BP: ()/(63-79) 119/76     Intake/Output - Last 3 Shifts     None          Physical Exam  Vitals signs and nursing note reviewed.   Constitutional:       General: She is not in acute distress.     Appearance: Normal appearance. She is normal weight. She is not ill-appearing.   HENT:      Head: Normocephalic and atraumatic.      Nose: Nose normal.      Mouth/Throat:      Mouth: Mucous membranes are moist.   Neck:      Musculoskeletal: Normal range of motion.   Cardiovascular:      Rate and Rhythm: Normal rate and regular rhythm.      Pulses: Normal pulses.   Pulmonary:      Effort: Pulmonary effort is normal. No respiratory distress.   Abdominal:      Comments: Abdomen soft, non-distended  Appropriately tender to palpation; no signs of peritonitis  RLQ ileostomy with liquid stool in appliance   Musculoskeletal: Normal range of motion.   Skin:     General: Skin is warm and dry.   Neurological:       General: No focal deficit present.      Mental Status: She is alert.   Psychiatric:         Mood and Affect: Mood normal.             Significant Labs:  CBC (Last 3 Results):   Recent Labs   Lab 11/11/20  0534 11/12/20  0405 11/12/20  0620   WBC 10.12 14.65* 13.09*   RBC 3.82* 2.75* 2.53*   HGB 10.2* 7.3* 6.8*   HCT 33.1* 23.3* 21.2*    332 300   MCV 87 85 84   MCH 26.7* 26.5* 26.9*   MCHC 30.8* 31.3* 32.1     CMP (Last 3 Results):   Recent Labs   Lab 11/06/20  0330 11/11/20  0534 11/12/20  0405    93 120*   CALCIUM 7.8* 9.0 8.4*   ALBUMIN  --   --  3.2*   PROT  --   --  6.0    138 132*   K 4.1 4.8 4.2   CO2 19* 22* 22*    103 99   BUN 9 8 14   CREATININE 0.8 0.7 0.7   ALKPHOS  --   --  38*   ALT  --   --  73*   AST  --   --  40   BILITOT  --   --  0.4       Significant Diagnostics:  I have reviewed all pertinent imaging results/findings within the past 24 hours.

## 2020-11-12 NOTE — ASSESSMENT & PLAN NOTE
Lottie Mitchell is a 25yoF with a past medical history significant for refractory ulcerative colitis s/p lap total colectomy with end ileostomy (6/2020) and completion proctectomy with IPAA and diverting loop ileostomy on 11/5/2020. The patient presented to an OSH with abdominal pain, nausea, vomiting. CT scan suggestive of an ileus and an incidental finding of a portal vein thrombus. Patient transferred for higher level of care.     - subjectively, patient endorses feeling better today  - return of ileostomy function  - Hgb downtrending; 7.2 this morning; 6.8 on repeat  - no obvious source of bleeding; likely within J pouch  - plan for pouch evacuation trial today  - anticoagulation held at this time  - Heme/onc consulted for anticoagulation management  - pain, nausea well controlled  - maintain NPO

## 2020-11-12 NOTE — PROGRESS NOTES
[facility]  Colorectal Surgery  Progress Note    Patient Name: Alissa Mitchell  MRN: [id number]  Admission Date: 11/11/2020  Hospital Length of Stay: 1 days  Attending Physician: Delia Mehta MD    Subjective:     Interval History: Patient seen and examined this morning. States nausea, vomiting improved. Ileostomy functioning. Denies blood vomitus or bloody bowel movements. Heme/Onc consulted for anticoagulation management. Hgb drop from 10 to 7 this morning. No other complaints at this time.     Post-Op Info:  * No surgery found *          Medications:  Continuous Infusions:   heparin (porcine) in D5W 17.929 Units/kg/hr (11/12/20 0522)     Scheduled Meds:   gabapentin  300 mg Oral TID     PRN Meds:   acetaminophen    heparin (PORCINE)    heparin (PORCINE)    morphine    morphine    naloxone    ondansetron    prochlorperazine    sodium chloride 0.9%        Objective:     Vital Signs (Most Recent):  Temp: 99.4 °F (37.4 °C) (11/12/20 0459)  Pulse: (!) 120 (11/12/20 0459)  Resp: 16 (11/12/20 0626)  BP: 119/76 (11/12/20 0459)  SpO2: 99 % (11/12/20 0459) Vital Signs (24h Range):  Temp:  [97.6 °F (36.4 °C)-99.4 °F (37.4 °C)] 99.4 °F (37.4 °C)  Pulse:  [] 120  Resp:  [16-18] 16  SpO2:  [96 %-99 %] 99 %  BP: ()/(63-79) 119/76     Intake/Output - Last 3 Shifts     None          Physical Exam  Vitals signs and nursing note reviewed.   Constitutional:       General: She is not in acute distress.     Appearance: Normal appearance. She is normal weight. She is not ill-appearing.   HENT:      Head: Normocephalic and atraumatic.      Nose: Nose normal.      Mouth/Throat:      Mouth: Mucous membranes are moist.   Neck:      Musculoskeletal: Normal range of motion.   Cardiovascular:      Rate and Rhythm: Normal rate and regular rhythm.      Pulses: Normal pulses.   Pulmonary:      Effort: Pulmonary effort is normal. No respiratory distress.   Abdominal:      Comments: Abdomen soft,  non-distended  Appropriately tender to palpation; no signs of peritonitis  RLQ ileostomy with liquid stool in appliance   Musculoskeletal: Normal range of motion.   Skin:     General: Skin is warm and dry.   Neurological:      General: No focal deficit present.      Mental Status: She is alert.   Psychiatric:         Mood and Affect: Mood normal.             Significant Labs:  CBC (Last 3 Results):   Recent Labs   Lab 11/11/20  0534 11/12/20  0405 11/12/20  0620   WBC 10.12 14.65* 13.09*   RBC 3.82* 2.75* 2.53*   HGB 10.2* 7.3* 6.8*   HCT 33.1* 23.3* 21.2*    332 300   MCV 87 85 84   MCH 26.7* 26.5* 26.9*   MCHC 30.8* 31.3* 32.1     CMP (Last 3 Results):   Recent Labs   Lab 11/06/20  0330 11/11/20  0534 11/12/20  0405    93 120*   CALCIUM 7.8* 9.0 8.4*   ALBUMIN  --   --  3.2*   PROT  --   --  6.0    138 132*   K 4.1 4.8 4.2   CO2 19* 22* 22*    103 99   BUN 9 8 14   CREATININE 0.8 0.7 0.7   ALKPHOS  --   --  38*   ALT  --   --  73*   AST  --   --  40   BILITOT  --   --  0.4       Significant Diagnostics:  I have reviewed all pertinent imaging results/findings within the past 24 hours.    Assessment/Plan:     Kennedy Mitchell is a 25yoF with a past medical history significant for refractory ulcerative colitis s/p lap total colectomy with end ileostomy (6/2020) and completion proctectomy with IPAA and diverting loop ileostomy on 11/5/2020. The patient presented to an OSH with abdominal pain, nausea, vomiting. CT scan suggestive of an ileus and an incidental finding of a portal vein thrombus. Patient transferred for higher level of care.     - subjectively, patient endorses feeling better today  - return of ileostomy function  - Hgb downtrending; 7.2 this morning; 6.8 on repeat  - no obvious source of bleeding; likely within J pouch  - plan for pouch evacuation trial today  - anticoagulation held at this time  - Heme/onc consulted for anticoagulation management  - pain, nausea well  controlled  - maintain NPO            Prince Pendleton MD  Colorectal Surgery  [facility]

## 2020-11-13 LAB
ALBUMIN SERPL BCP-MCNC: 3.3 G/DL (ref 3.5–5.2)
ALP SERPL-CCNC: 36 U/L (ref 55–135)
ALT SERPL W/O P-5'-P-CCNC: 53 U/L (ref 10–44)
ANION GAP SERPL CALC-SCNC: 13 MMOL/L (ref 8–16)
AST SERPL-CCNC: 28 U/L (ref 10–40)
BASOPHILS # BLD AUTO: 0.04 K/UL (ref 0–0.2)
BASOPHILS NFR BLD: 0.5 % (ref 0–1.9)
BILIRUB SERPL-MCNC: 1 MG/DL (ref 0.1–1)
BUN SERPL-MCNC: 12 MG/DL (ref 6–20)
CALCIUM SERPL-MCNC: 8.7 MG/DL (ref 8.7–10.5)
CHLORIDE SERPL-SCNC: 100 MMOL/L (ref 95–110)
CO2 SERPL-SCNC: 23 MMOL/L (ref 23–29)
CREAT SERPL-MCNC: 0.6 MG/DL (ref 0.5–1.4)
CRP SERPL-MCNC: 108.3 MG/L (ref 0–8.2)
DIFFERENTIAL METHOD: ABNORMAL
EOSINOPHIL # BLD AUTO: 0.2 K/UL (ref 0–0.5)
EOSINOPHIL NFR BLD: 2.4 % (ref 0–8)
ERYTHROCYTE [DISTWIDTH] IN BLOOD BY AUTOMATED COUNT: 13.8 % (ref 11.5–14.5)
EST. GFR  (AFRICAN AMERICAN): >60 ML/MIN/1.73 M^2
EST. GFR  (NON AFRICAN AMERICAN): >60 ML/MIN/1.73 M^2
GLUCOSE SERPL-MCNC: 73 MG/DL (ref 70–110)
HCT VFR BLD AUTO: 24.2 % (ref 37–48.5)
HGB BLD-MCNC: 7.6 G/DL (ref 12–16)
IMM GRANULOCYTES # BLD AUTO: 0.16 K/UL (ref 0–0.04)
IMM GRANULOCYTES NFR BLD AUTO: 1.8 % (ref 0–0.5)
LYMPHOCYTES # BLD AUTO: 1.3 K/UL (ref 1–4.8)
LYMPHOCYTES NFR BLD: 14.8 % (ref 18–48)
MCH RBC QN AUTO: 27 PG (ref 27–31)
MCHC RBC AUTO-ENTMCNC: 31.4 G/DL (ref 32–36)
MCV RBC AUTO: 86 FL (ref 82–98)
MONOCYTES # BLD AUTO: 1 K/UL (ref 0.3–1)
MONOCYTES NFR BLD: 11.9 % (ref 4–15)
NEUTROPHILS # BLD AUTO: 6 K/UL (ref 1.8–7.7)
NEUTROPHILS NFR BLD: 68.6 % (ref 38–73)
NRBC BLD-RTO: 0 /100 WBC
PLATELET # BLD AUTO: 294 K/UL (ref 150–350)
PMV BLD AUTO: 10.6 FL (ref 9.2–12.9)
POTASSIUM SERPL-SCNC: 3.6 MMOL/L (ref 3.5–5.1)
PROT SERPL-MCNC: 6.2 G/DL (ref 6–8.4)
RBC # BLD AUTO: 2.82 M/UL (ref 4–5.4)
SODIUM SERPL-SCNC: 136 MMOL/L (ref 136–145)
WBC # BLD AUTO: 8.77 K/UL (ref 3.9–12.7)

## 2020-11-13 PROCEDURE — 63600175 PHARM REV CODE 636 W HCPCS: Performed by: STUDENT IN AN ORGANIZED HEALTH CARE EDUCATION/TRAINING PROGRAM

## 2020-11-13 PROCEDURE — 36415 COLL VENOUS BLD VENIPUNCTURE: CPT

## 2020-11-13 PROCEDURE — 86140 C-REACTIVE PROTEIN: CPT

## 2020-11-13 PROCEDURE — 25000003 PHARM REV CODE 250: Performed by: STUDENT IN AN ORGANIZED HEALTH CARE EDUCATION/TRAINING PROGRAM

## 2020-11-13 PROCEDURE — 85025 COMPLETE CBC W/AUTO DIFF WBC: CPT

## 2020-11-13 PROCEDURE — 11000001 HC ACUTE MED/SURG PRIVATE ROOM

## 2020-11-13 PROCEDURE — 80053 COMPREHEN METABOLIC PANEL: CPT

## 2020-11-13 RX ORDER — ENOXAPARIN SODIUM 100 MG/ML
40 INJECTION SUBCUTANEOUS EVERY 24 HOURS
Status: DISCONTINUED | OUTPATIENT
Start: 2020-11-13 | End: 2020-11-15

## 2020-11-13 RX ADMIN — MORPHINE SULFATE 1 MG: 2 INJECTION, SOLUTION INTRAMUSCULAR; INTRAVENOUS at 02:11

## 2020-11-13 RX ADMIN — ACETAMINOPHEN 650 MG: 325 TABLET ORAL at 02:11

## 2020-11-13 RX ADMIN — GABAPENTIN 300 MG: 300 CAPSULE ORAL at 08:11

## 2020-11-13 RX ADMIN — ACETAMINOPHEN 650 MG: 325 TABLET ORAL at 04:11

## 2020-11-13 RX ADMIN — GABAPENTIN 300 MG: 300 CAPSULE ORAL at 04:11

## 2020-11-13 RX ADMIN — ACETAMINOPHEN 650 MG: 325 TABLET ORAL at 10:11

## 2020-11-13 RX ADMIN — MORPHINE SULFATE 2 MG: 2 INJECTION, SOLUTION INTRAMUSCULAR; INTRAVENOUS at 04:11

## 2020-11-13 RX ADMIN — GABAPENTIN 300 MG: 300 CAPSULE ORAL at 09:11

## 2020-11-13 RX ADMIN — TAMSULOSIN HYDROCHLORIDE 0.4 MG: 0.4 CAPSULE ORAL at 09:11

## 2020-11-13 RX ADMIN — ENOXAPARIN SODIUM 40 MG: 30 INJECTION SUBCUTANEOUS at 05:11

## 2020-11-13 RX ADMIN — MORPHINE SULFATE 2 MG: 2 INJECTION, SOLUTION INTRAMUSCULAR; INTRAVENOUS at 07:11

## 2020-11-13 RX ADMIN — MORPHINE SULFATE 2 MG: 2 INJECTION, SOLUTION INTRAMUSCULAR; INTRAVENOUS at 06:11

## 2020-11-13 NOTE — SUBJECTIVE & OBJECTIVE
Subjective:     Interval History: Patient seen and examined this morning. Appropriately responded to 1u pRBCs transfused yesterday. Hgb stable this morning. Denies nausea, vomiting. Ostomy functioning. No other complaints at this time.     Post-Op Info:  * No surgery found *          Medications:  Continuous Infusions:   heparin (porcine) in D5W Stopped (11/12/20 0700)     Scheduled Meds:   gabapentin  300 mg Oral TID    tamsulosin  0.4 mg Oral Daily     PRN Meds:   sodium chloride    sodium chloride    acetaminophen    heparin (PORCINE)    heparin (PORCINE)    morphine    morphine    naloxone    ondansetron    prochlorperazine    sodium chloride 0.9%        Objective:     Vital Signs (Most Recent):  Temp: 98.7 °F (37.1 °C) (11/13/20 0438)  Pulse: 89 (11/13/20 0438)  Resp: 16 (11/13/20 0438)  BP: 100/66 (11/13/20 0438)  SpO2: 98 % (11/13/20 0438) Vital Signs (24h Range):  Temp:  [97.7 °F (36.5 °C)-99.3 °F (37.4 °C)] 98.7 °F (37.1 °C)  Pulse:  [] 89  Resp:  [13-20] 16  SpO2:  [96 %-100 %] 98 %  BP: ()/(51-85) 100/66     Intake/Output - Last 3 Shifts       11/11 0700 - 11/12 0659 11/12 0700 - 11/13 0659 11/13 0700 - 11/14 0659    Urine (mL/kg/hr)  2000 (1.5)     Stool  220     Total Output  2220     Net  -2220                  Physical Exam  Vitals signs and nursing note reviewed.   Constitutional:       General: She is not in acute distress.     Appearance: Normal appearance. She is normal weight. She is not ill-appearing.   HENT:      Head: Normocephalic and atraumatic.      Nose: Nose normal.      Mouth/Throat:      Mouth: Mucous membranes are moist.   Neck:      Musculoskeletal: Normal range of motion.   Cardiovascular:      Rate and Rhythm: Normal rate and regular rhythm.      Pulses: Normal pulses.   Pulmonary:      Effort: Pulmonary effort is normal. No respiratory distress.   Abdominal:      Comments: Abdomen soft, non-distended  Appropriately tender to palpation; no signs of  peritonitis  RLQ ileostomy with soft stool in appliance   Musculoskeletal: Normal range of motion.   Skin:     General: Skin is warm and dry.   Neurological:      General: No focal deficit present.      Mental Status: She is alert.   Psychiatric:         Mood and Affect: Mood normal.             Significant Labs:  CBC (Last 3 Results):   Recent Labs   Lab 11/12/20  0620 11/12/20  1950 11/13/20  0315   WBC 13.09* 13.21* 8.77   RBC 2.53* 2.66* 2.82*   HGB 6.8* 7.4* 7.6*   HCT 21.2* 22.8* 24.2*    273 294   MCV 84 86 86   MCH 26.9* 27.8 27.0   MCHC 32.1 32.5 31.4*       Significant Diagnostics:  I have reviewed all pertinent imaging results/findings within the past 24 hours.

## 2020-11-13 NOTE — ASSESSMENT & PLAN NOTE
Lottie Mitchell is a 25yoF with a past medical history significant for refractory ulcerative colitis s/p lap total colectomy with end ileostomy (6/2020) and completion proctectomy with IPAA and diverting loop ileostomy on 11/5/2020. The patient presented to an OSH with abdominal pain, nausea, vomiting. CT scan suggestive of an ileus and an incidental finding of a portal vein thrombus. Patient transferred for higher level of care.     - patient continues to improve  - Hgb stable this morning; 7.6 today  - responded appropriately to 1u pRBC transfusion  - to discuss anticoagulation with staff  - encouraged pouch evacuation trial today  - Heme/onc on board; appreciating recs  - diet advanced to clear liquids today  - pain, nausea well controlled

## 2020-11-13 NOTE — PLAN OF CARE
Quiet hours. Pain controlled with current meds. Npo maintained x ice chips and sips with meds. Ileostomy intact and patent, small amt liquid stool noted.barron cath intact with clear yellow urine. Vss. Safety maintained.

## 2020-11-13 NOTE — PROGRESS NOTES
[facility]  Colorectal Surgery  Progress Note    Patient Name: Alissa Mitchell  MRN: [id number]  Admission Date: 11/11/2020  Hospital Length of Stay: 2 days  Attending Physician: Delia Mehta MD    Subjective:     Interval History: Patient seen and examined this morning. Appropriately responded to 1u pRBCs transfused yesterday. Hgb stable this morning. Denies nausea, vomiting. Ostomy functioning. No other complaints at this time.     Post-Op Info:  * No surgery found *          Medications:  Continuous Infusions:   heparin (porcine) in D5W Stopped (11/12/20 0700)     Scheduled Meds:   gabapentin  300 mg Oral TID    tamsulosin  0.4 mg Oral Daily     PRN Meds:   sodium chloride    sodium chloride    acetaminophen    heparin (PORCINE)    heparin (PORCINE)    morphine    morphine    naloxone    ondansetron    prochlorperazine    sodium chloride 0.9%        Objective:     Vital Signs (Most Recent):  Temp: 98.7 °F (37.1 °C) (11/13/20 0438)  Pulse: 89 (11/13/20 0438)  Resp: 16 (11/13/20 0438)  BP: 100/66 (11/13/20 0438)  SpO2: 98 % (11/13/20 0438) Vital Signs (24h Range):  Temp:  [97.7 °F (36.5 °C)-99.3 °F (37.4 °C)] 98.7 °F (37.1 °C)  Pulse:  [] 89  Resp:  [13-20] 16  SpO2:  [96 %-100 %] 98 %  BP: ()/(51-85) 100/66     Intake/Output - Last 3 Shifts       11/11 0700 - 11/12 0659 11/12 0700 - 11/13 0659 11/13 0700 - 11/14 0659    Urine (mL/kg/hr)  2000 (1.5)     Stool  220     Total Output  2220     Net  -2220                  Physical Exam  Vitals signs and nursing note reviewed.   Constitutional:       General: She is not in acute distress.     Appearance: Normal appearance. She is normal weight. She is not ill-appearing.   HENT:      Head: Normocephalic and atraumatic.      Nose: Nose normal.      Mouth/Throat:      Mouth: Mucous membranes are moist.   Neck:      Musculoskeletal: Normal range of motion.   Cardiovascular:      Rate and Rhythm: Normal rate and regular  rhythm.      Pulses: Normal pulses.   Pulmonary:      Effort: Pulmonary effort is normal. No respiratory distress.   Abdominal:      Comments: Abdomen soft, non-distended  Appropriately tender to palpation; no signs of peritonitis  RLQ ileostomy with soft stool in appliance   Musculoskeletal: Normal range of motion.   Skin:     General: Skin is warm and dry.   Neurological:      General: No focal deficit present.      Mental Status: She is alert.   Psychiatric:         Mood and Affect: Mood normal.             Significant Labs:  CBC (Last 3 Results):   Recent Labs   Lab 11/12/20  0620 11/12/20  1950 11/13/20  0315   WBC 13.09* 13.21* 8.77   RBC 2.53* 2.66* 2.82*   HGB 6.8* 7.4* 7.6*   HCT 21.2* 22.8* 24.2*    273 294   MCV 84 86 86   MCH 26.9* 27.8 27.0   MCHC 32.1 32.5 31.4*       Significant Diagnostics:  I have reviewed all pertinent imaging results/findings within the past 24 hours.    Assessment/Plan:     Kennedy Mitchell is a 25yoF with a past medical history significant for refractory ulcerative colitis s/p lap total colectomy with end ileostomy (6/2020) and completion proctectomy with IPAA and diverting loop ileostomy on 11/5/2020. The patient presented to an OSH with abdominal pain, nausea, vomiting. CT scan suggestive of an ileus and an incidental finding of a portal vein thrombus. Patient transferred for higher level of care.     - patient continues to improve  - Hgb stable this morning; 7.6 today  - responded appropriately to 1u pRBC transfusion  - to discuss anticoagulation with staff  - encouraged pouch evacuation trial today  - Heme/onc on board; appreciating recs  - diet advanced to clear liquids today  - pain, nausea well controlled              Prince Pendleton MD  Colorectal Surgery  [facility]

## 2020-11-14 LAB
ALBUMIN SERPL BCP-MCNC: 3.5 G/DL (ref 3.5–5.2)
ALP SERPL-CCNC: 42 U/L (ref 55–135)
ALT SERPL W/O P-5'-P-CCNC: 42 U/L (ref 10–44)
ANION GAP SERPL CALC-SCNC: 12 MMOL/L (ref 8–16)
AST SERPL-CCNC: 21 U/L (ref 10–40)
BASOPHILS # BLD AUTO: 0.04 K/UL (ref 0–0.2)
BASOPHILS NFR BLD: 0.5 % (ref 0–1.9)
BILIRUB SERPL-MCNC: 0.5 MG/DL (ref 0.1–1)
BUN SERPL-MCNC: 9 MG/DL (ref 6–20)
CALCIUM SERPL-MCNC: 8.7 MG/DL (ref 8.7–10.5)
CHLORIDE SERPL-SCNC: 99 MMOL/L (ref 95–110)
CO2 SERPL-SCNC: 25 MMOL/L (ref 23–29)
CREAT SERPL-MCNC: 0.6 MG/DL (ref 0.5–1.4)
CRP SERPL-MCNC: 52.4 MG/L (ref 0–8.2)
DIFFERENTIAL METHOD: ABNORMAL
EOSINOPHIL # BLD AUTO: 0.3 K/UL (ref 0–0.5)
EOSINOPHIL NFR BLD: 3.6 % (ref 0–8)
ERYTHROCYTE [DISTWIDTH] IN BLOOD BY AUTOMATED COUNT: 13.8 % (ref 11.5–14.5)
EST. GFR  (AFRICAN AMERICAN): >60 ML/MIN/1.73 M^2
EST. GFR  (NON AFRICAN AMERICAN): >60 ML/MIN/1.73 M^2
GLUCOSE SERPL-MCNC: 97 MG/DL (ref 70–110)
HCT VFR BLD AUTO: 23.6 % (ref 37–48.5)
HGB BLD-MCNC: 7.4 G/DL (ref 12–16)
IMM GRANULOCYTES # BLD AUTO: 0.15 K/UL (ref 0–0.04)
IMM GRANULOCYTES NFR BLD AUTO: 1.9 % (ref 0–0.5)
LYMPHOCYTES # BLD AUTO: 1.5 K/UL (ref 1–4.8)
LYMPHOCYTES NFR BLD: 19 % (ref 18–48)
MCH RBC QN AUTO: 26.8 PG (ref 27–31)
MCHC RBC AUTO-ENTMCNC: 31.4 G/DL (ref 32–36)
MCV RBC AUTO: 86 FL (ref 82–98)
MONOCYTES # BLD AUTO: 1 K/UL (ref 0.3–1)
MONOCYTES NFR BLD: 12.7 % (ref 4–15)
NEUTROPHILS # BLD AUTO: 4.9 K/UL (ref 1.8–7.7)
NEUTROPHILS NFR BLD: 62.3 % (ref 38–73)
NRBC BLD-RTO: 0 /100 WBC
PLATELET # BLD AUTO: 360 K/UL (ref 150–350)
PMV BLD AUTO: 10.6 FL (ref 9.2–12.9)
POTASSIUM SERPL-SCNC: 3.4 MMOL/L (ref 3.5–5.1)
PROT SERPL-MCNC: 6.7 G/DL (ref 6–8.4)
RBC # BLD AUTO: 2.76 M/UL (ref 4–5.4)
RBC CD59 DEFICIENT NFR: 0 % (ref 0–0)
SODIUM SERPL-SCNC: 136 MMOL/L (ref 136–145)
WBC # BLD AUTO: 7.79 K/UL (ref 3.9–12.7)

## 2020-11-14 PROCEDURE — 63600175 PHARM REV CODE 636 W HCPCS: Performed by: STUDENT IN AN ORGANIZED HEALTH CARE EDUCATION/TRAINING PROGRAM

## 2020-11-14 PROCEDURE — 85025 COMPLETE CBC W/AUTO DIFF WBC: CPT

## 2020-11-14 PROCEDURE — 36415 COLL VENOUS BLD VENIPUNCTURE: CPT

## 2020-11-14 PROCEDURE — 25000003 PHARM REV CODE 250: Performed by: STUDENT IN AN ORGANIZED HEALTH CARE EDUCATION/TRAINING PROGRAM

## 2020-11-14 PROCEDURE — 11000001 HC ACUTE MED/SURG PRIVATE ROOM

## 2020-11-14 PROCEDURE — 80053 COMPREHEN METABOLIC PANEL: CPT

## 2020-11-14 PROCEDURE — 86140 C-REACTIVE PROTEIN: CPT

## 2020-11-14 RX ORDER — HYDROCODONE BITARTRATE AND ACETAMINOPHEN 5; 325 MG/1; MG/1
1 TABLET ORAL EVERY 6 HOURS PRN
Status: DISCONTINUED | OUTPATIENT
Start: 2020-11-14 | End: 2020-11-17 | Stop reason: HOSPADM

## 2020-11-14 RX ORDER — HYDROCODONE BITARTRATE AND ACETAMINOPHEN 10; 325 MG/1; MG/1
1 TABLET ORAL EVERY 6 HOURS PRN
Status: DISCONTINUED | OUTPATIENT
Start: 2020-11-14 | End: 2020-11-17 | Stop reason: HOSPADM

## 2020-11-14 RX ADMIN — HYDROCODONE BITARTRATE AND ACETAMINOPHEN 1 TABLET: 10; 325 TABLET ORAL at 10:11

## 2020-11-14 RX ADMIN — GABAPENTIN 300 MG: 300 CAPSULE ORAL at 08:11

## 2020-11-14 RX ADMIN — GABAPENTIN 300 MG: 300 CAPSULE ORAL at 02:11

## 2020-11-14 RX ADMIN — MORPHINE SULFATE 1 MG: 2 INJECTION, SOLUTION INTRAMUSCULAR; INTRAVENOUS at 05:11

## 2020-11-14 RX ADMIN — MORPHINE SULFATE 1 MG: 2 INJECTION, SOLUTION INTRAMUSCULAR; INTRAVENOUS at 12:11

## 2020-11-14 RX ADMIN — HYDROCODONE BITARTRATE AND ACETAMINOPHEN 1 TABLET: 10; 325 TABLET ORAL at 02:11

## 2020-11-14 RX ADMIN — ACETAMINOPHEN 650 MG: 325 TABLET ORAL at 04:11

## 2020-11-14 RX ADMIN — TAMSULOSIN HYDROCHLORIDE 0.4 MG: 0.4 CAPSULE ORAL at 08:11

## 2020-11-14 RX ADMIN — ENOXAPARIN SODIUM 40 MG: 30 INJECTION SUBCUTANEOUS at 04:11

## 2020-11-14 NOTE — PLAN OF CARE
Problem: Fall Injury Risk  Goal: Absence of Fall and Fall-Related Injury  Outcome: Ongoing, Progressing     Problem: Adult Inpatient Plan of Care  Goal: Plan of Care Review  Outcome: Ongoing, Progressing     Problem: Adult Inpatient Plan of Care  Goal: Optimal Comfort and Wellbeing  Outcome: Ongoing, Progressing      POC reviewed with pt, pt verbalized understanding. Pt is AAOx4. VSS. No falls, injury as pt calls for assistance when needed. No s/s of breakdown as pt is independent with positioning self. Pain being monitored and controlled with PRN and scheduled meds. Pt denies any complaints of N/V. Pt performed ostomy care successfully. Frequent rounds made for pt safety and care. Safety precautions remain, bed in lowest position, side rails up x2, call light and personal belongings within reach. Will continue to monitor and follow plan of care.

## 2020-11-14 NOTE — PROGRESS NOTES
[facility]  General Surgery  Progress Note    Subjective:     History of Present Illness:  No notes on file    Post-Op Info:  * No surgery found *           Subjective:     Interval History: Patient seen and examined this morning. Some mild abdominal soreness. Patient reports feeling better. Denies nausea, vomiting. Ostomy functioning. No other complaints at this time.     Post-Op Info:  * No surgery found *          Medications:  Continuous Infusions:    Scheduled Meds:   enoxaparin  40 mg Subcutaneous Q24H    gabapentin  300 mg Oral TID    tamsulosin  0.4 mg Oral Daily     PRN Meds:   sodium chloride    sodium chloride    acetaminophen    morphine    morphine    naloxone    ondansetron    prochlorperazine    sodium chloride 0.9%        Objective:     Vital Signs (Most Recent):  Temp: 98 °F (36.7 °C) (11/14/20 0835)  Pulse: 75 (11/14/20 0835)  Resp: 18 (11/14/20 0835)  BP: 101/64 (11/14/20 0835)  SpO2: 97 % (11/14/20 0835) Vital Signs (24h Range):  Temp:  [98 °F (36.7 °C)-100 °F (37.8 °C)] 98 °F (36.7 °C)  Pulse:  [] 75  Resp:  [11-18] 18  SpO2:  [97 %-100 %] 97 %  BP: ()/(64-73) 101/64     Intake/Output - Last 3 Shifts       11/12 0700 - 11/13 0659 11/13 0700 - 11/14 0659 11/14 0700 - 11/15 0659    Urine (mL/kg/hr) 2000 (1.5)      Stool 220      Total Output 2220      Net -2220                   Physical Exam  Vitals signs and nursing note reviewed.   Constitutional:       General: She is not in acute distress.     Appearance: Normal appearance. She is normal weight. She is not ill-appearing.   HENT:      Head: Normocephalic and atraumatic.      Nose: Nose normal.      Mouth/Throat:      Mouth: Mucous membranes are moist.   Neck:      Musculoskeletal: Normal range of motion.   Cardiovascular:      Rate and Rhythm: Normal rate and regular rhythm.      Pulses: Normal pulses.   Pulmonary:      Effort: Pulmonary effort is normal. No respiratory distress.   Abdominal:       Comments: Abdomen soft, non-distended  Appropriately tender to palpation; no signs of peritonitis  RLQ ileostomy with soft stool in appliance   Musculoskeletal: Normal range of motion.   Skin:     General: Skin is warm and dry.   Neurological:      General: No focal deficit present.      Mental Status: She is alert.   Psychiatric:         Mood and Affect: Mood normal.             Significant Labs:  CBC (Last 3 Results):   Recent Labs   Lab 11/12/20  1950 11/13/20  0315 11/14/20  0246   WBC 13.21* 8.77 7.79   RBC 2.66* 2.82* 2.76*   HGB 7.4* 7.6* 7.4*   HCT 22.8* 24.2* 23.6*    294 360*   MCV 86 86 86   MCH 27.8 27.0 26.8*   MCHC 32.5 31.4* 31.4*       Significant Diagnostics:  I have reviewed all pertinent imaging results/findings within the past 24 hours.    Assessment/Plan:     Kennedy Mitchell is a 25yoF with a past medical history significant for refractory ulcerative colitis s/p lap total colectomy with end ileostomy (6/2020) and completion proctectomy with IPAA and diverting loop ileostomy on 11/5/2020. The patient presented to an OSH with abdominal pain, nausea, vomiting. CT scan suggestive of an ileus and an incidental finding of a portal vein thrombus. Patient transferred for higher level of care.      - patient continues to improve  - Hgb stable this morning  - responded appropriately to 1u pRBC transfusion  - Lovenox  - Heme/onc on board; appreciating recs  - Advance to regular diet  - pain, nausea well controlled        Gregoria Montiel MD  General Surgery  [facility]

## 2020-11-14 NOTE — ASSESSMENT & PLAN NOTE
Lottie Mitchell is a 25yoF with a past medical history significant for refractory ulcerative colitis s/p lap total colectomy with end ileostomy (6/2020) and completion proctectomy with IPAA and diverting loop ileostomy on 11/5/2020. The patient presented to an OSH with abdominal pain, nausea, vomiting. CT scan suggestive of an ileus and an incidental finding of a portal vein thrombus. Patient transferred for higher level of care.      - patient continues to improve  - Hgb stable this morning  - responded appropriately to 1u pRBC transfusion  - Lovenox  - Heme/onc on board; appreciating recs  - Advance to regular diet  - pain, nausea well controlled

## 2020-11-14 NOTE — SUBJECTIVE & OBJECTIVE
Subjective:     Interval History: Patient seen and examined this morning. Some mild abdominal soreness. Patient reports feeling better. Denies nausea, vomiting. Ostomy functioning. No other complaints at this time.     Post-Op Info:  * No surgery found *          Medications:  Continuous Infusions:    Scheduled Meds:   enoxaparin  40 mg Subcutaneous Q24H    gabapentin  300 mg Oral TID    tamsulosin  0.4 mg Oral Daily     PRN Meds:   sodium chloride    sodium chloride    acetaminophen    morphine    morphine    naloxone    ondansetron    prochlorperazine    sodium chloride 0.9%        Objective:     Vital Signs (Most Recent):  Temp: 98 °F (36.7 °C) (11/14/20 0835)  Pulse: 75 (11/14/20 0835)  Resp: 18 (11/14/20 0835)  BP: 101/64 (11/14/20 0835)  SpO2: 97 % (11/14/20 0835) Vital Signs (24h Range):  Temp:  [98 °F (36.7 °C)-100 °F (37.8 °C)] 98 °F (36.7 °C)  Pulse:  [] 75  Resp:  [11-18] 18  SpO2:  [97 %-100 %] 97 %  BP: ()/(64-73) 101/64     Intake/Output - Last 3 Shifts       11/12 0700 - 11/13 0659 11/13 0700 - 11/14 0659 11/14 0700 - 11/15 0659    Urine (mL/kg/hr) 2000 (1.5)      Stool 220      Total Output 2220      Net -2220                   Physical Exam  Vitals signs and nursing note reviewed.   Constitutional:       General: She is not in acute distress.     Appearance: Normal appearance. She is normal weight. She is not ill-appearing.   HENT:      Head: Normocephalic and atraumatic.      Nose: Nose normal.      Mouth/Throat:      Mouth: Mucous membranes are moist.   Neck:      Musculoskeletal: Normal range of motion.   Cardiovascular:      Rate and Rhythm: Normal rate and regular rhythm.      Pulses: Normal pulses.   Pulmonary:      Effort: Pulmonary effort is normal. No respiratory distress.   Abdominal:      Comments: Abdomen soft, non-distended  Appropriately tender to palpation; no signs of peritonitis  RLQ ileostomy with soft stool in appliance   Musculoskeletal: Normal range of  motion.   Skin:     General: Skin is warm and dry.   Neurological:      General: No focal deficit present.      Mental Status: She is alert.   Psychiatric:         Mood and Affect: Mood normal.             Significant Labs:  CBC (Last 3 Results):   Recent Labs   Lab 11/12/20  1950 11/13/20  0315 11/14/20  0246   WBC 13.21* 8.77 7.79   RBC 2.66* 2.82* 2.76*   HGB 7.4* 7.6* 7.4*   HCT 22.8* 24.2* 23.6*    294 360*   MCV 86 86 86   MCH 27.8 27.0 26.8*   MCHC 32.5 31.4* 31.4*       Significant Diagnostics:  I have reviewed all pertinent imaging results/findings within the past 24 hours.

## 2020-11-14 NOTE — PLAN OF CARE
Pt resting in bed.. pt verbalized and shows proper care to LLQ ostomy. fall precautions maintained no falls noted, call light in reach, bed locked, non skid socks on while out of bed pt instructed to call for assistance, skin integrity maintained pt independent with positioning, c/o pain managed with prn meds. Pt is tolerating clear liquid diet. no other complaints or concerns, will cont to follow careplan

## 2020-11-14 NOTE — PLAN OF CARE
Plan of care reviewed with pt. Pt aaox4, VS as charted. Purposeful rounding for pt care and safety. Pain controlled with PRN medication. Pt showed proper care to LLQ ostomy. No reports of NV. No falls/injury reported this shift. Skin integrity intact. IS at bedside, education and instruction provided. SCD in place. Pt is independent, informed to call for assistance. Safety precautions maintained - bed in low position, call light in reach, side rails up x2.

## 2020-11-15 LAB
ALBUMIN SERPL BCP-MCNC: 3.9 G/DL (ref 3.5–5.2)
ALP SERPL-CCNC: 48 U/L (ref 55–135)
ALT SERPL W/O P-5'-P-CCNC: 44 U/L (ref 10–44)
ANION GAP SERPL CALC-SCNC: 14 MMOL/L (ref 8–16)
AST SERPL-CCNC: 28 U/L (ref 10–40)
BASOPHILS # BLD AUTO: 0.03 K/UL (ref 0–0.2)
BASOPHILS NFR BLD: 0.3 % (ref 0–1.9)
BILIRUB SERPL-MCNC: 0.6 MG/DL (ref 0.1–1)
BUN SERPL-MCNC: 11 MG/DL (ref 6–20)
CALCIUM SERPL-MCNC: 9.4 MG/DL (ref 8.7–10.5)
CHLORIDE SERPL-SCNC: 93 MMOL/L (ref 95–110)
CO2 SERPL-SCNC: 25 MMOL/L (ref 23–29)
CREAT SERPL-MCNC: 0.7 MG/DL (ref 0.5–1.4)
CRP SERPL-MCNC: 43.5 MG/L (ref 0–8.2)
DIFFERENTIAL METHOD: ABNORMAL
EOSINOPHIL # BLD AUTO: 0.4 K/UL (ref 0–0.5)
EOSINOPHIL NFR BLD: 4.3 % (ref 0–8)
ERYTHROCYTE [DISTWIDTH] IN BLOOD BY AUTOMATED COUNT: 14 % (ref 11.5–14.5)
EST. GFR  (AFRICAN AMERICAN): >60 ML/MIN/1.73 M^2
EST. GFR  (NON AFRICAN AMERICAN): >60 ML/MIN/1.73 M^2
GLUCOSE SERPL-MCNC: 92 MG/DL (ref 70–110)
HCT VFR BLD AUTO: 26.3 % (ref 37–48.5)
HGB BLD-MCNC: 8.3 G/DL (ref 12–16)
IMM GRANULOCYTES # BLD AUTO: 0.12 K/UL (ref 0–0.04)
IMM GRANULOCYTES NFR BLD AUTO: 1.3 % (ref 0–0.5)
LYMPHOCYTES # BLD AUTO: 1.2 K/UL (ref 1–4.8)
LYMPHOCYTES NFR BLD: 12.5 % (ref 18–48)
MCH RBC QN AUTO: 26.9 PG (ref 27–31)
MCHC RBC AUTO-ENTMCNC: 31.6 G/DL (ref 32–36)
MCV RBC AUTO: 85 FL (ref 82–98)
MONOCYTES # BLD AUTO: 1 K/UL (ref 0.3–1)
MONOCYTES NFR BLD: 10.6 % (ref 4–15)
NEUTROPHILS # BLD AUTO: 6.8 K/UL (ref 1.8–7.7)
NEUTROPHILS NFR BLD: 71 % (ref 38–73)
NRBC BLD-RTO: 0 /100 WBC
PLATELET # BLD AUTO: 474 K/UL (ref 150–350)
PMV BLD AUTO: 10.5 FL (ref 9.2–12.9)
POTASSIUM SERPL-SCNC: 3.5 MMOL/L (ref 3.5–5.1)
PROT SERPL-MCNC: 7.5 G/DL (ref 6–8.4)
RBC # BLD AUTO: 3.08 M/UL (ref 4–5.4)
SODIUM SERPL-SCNC: 132 MMOL/L (ref 136–145)
WBC # BLD AUTO: 9.6 K/UL (ref 3.9–12.7)

## 2020-11-15 PROCEDURE — 36415 COLL VENOUS BLD VENIPUNCTURE: CPT

## 2020-11-15 PROCEDURE — 63600175 PHARM REV CODE 636 W HCPCS: Performed by: STUDENT IN AN ORGANIZED HEALTH CARE EDUCATION/TRAINING PROGRAM

## 2020-11-15 PROCEDURE — 25000003 PHARM REV CODE 250: Performed by: STUDENT IN AN ORGANIZED HEALTH CARE EDUCATION/TRAINING PROGRAM

## 2020-11-15 PROCEDURE — 80053 COMPREHEN METABOLIC PANEL: CPT

## 2020-11-15 PROCEDURE — 94761 N-INVAS EAR/PLS OXIMETRY MLT: CPT

## 2020-11-15 PROCEDURE — 85025 COMPLETE CBC W/AUTO DIFF WBC: CPT

## 2020-11-15 PROCEDURE — 86140 C-REACTIVE PROTEIN: CPT

## 2020-11-15 PROCEDURE — 11000001 HC ACUTE MED/SURG PRIVATE ROOM

## 2020-11-15 RX ORDER — ENOXAPARIN SODIUM 100 MG/ML
1 INJECTION SUBCUTANEOUS EVERY 12 HOURS
Status: DISCONTINUED | OUTPATIENT
Start: 2020-11-15 | End: 2020-11-16

## 2020-11-15 RX ADMIN — TAMSULOSIN HYDROCHLORIDE 0.4 MG: 0.4 CAPSULE ORAL at 08:11

## 2020-11-15 RX ADMIN — ENOXAPARIN SODIUM 60 MG: 30 INJECTION SUBCUTANEOUS at 08:11

## 2020-11-15 RX ADMIN — HYDROCODONE BITARTRATE AND ACETAMINOPHEN 1 TABLET: 10; 325 TABLET ORAL at 05:11

## 2020-11-15 RX ADMIN — ENOXAPARIN SODIUM 60 MG: 30 INJECTION SUBCUTANEOUS at 09:11

## 2020-11-15 RX ADMIN — HYDROCODONE BITARTRATE AND ACETAMINOPHEN 1 TABLET: 10; 325 TABLET ORAL at 07:11

## 2020-11-15 RX ADMIN — GABAPENTIN 300 MG: 300 CAPSULE ORAL at 08:11

## 2020-11-15 NOTE — PROGRESS NOTES
[facility]  General Surgery  Progress Note    Subjective:     History of Present Illness:  No notes on file    Post-Op Info:  * No surgery found *           Subjective:     Interval History: Patient seen and examined this morning. Some mild abdominal pain. Patient reports feeling better. Tolerating diet. Active, ambulating. Denies nausea, vomiting. Ostomy functioning. No other complaints at this time.     Post-Op Info:  * No surgery found *          Medications:  Continuous Infusions:    Scheduled Meds:   enoxaparin  40 mg Subcutaneous Q24H    gabapentin  300 mg Oral TID    tamsulosin  0.4 mg Oral Daily     PRN Meds:   sodium chloride    sodium chloride    acetaminophen    HYDROcodone-acetaminophen    HYDROcodone-acetaminophen    naloxone    ondansetron    prochlorperazine    sodium chloride 0.9%        Objective:     Vital Signs (Most Recent):  Temp: 98.1 °F (36.7 °C) (11/15/20 0801)  Pulse: 85 (11/15/20 0801)  Resp: 18 (11/15/20 0801)  BP: 106/65 (11/15/20 0801)  SpO2: 97 % (11/15/20 0801) Vital Signs (24h Range):  Temp:  [97.8 °F (36.6 °C)-99.5 °F (37.5 °C)] 98.1 °F (36.7 °C)  Pulse:  [75-91] 85  Resp:  [14-18] 18  SpO2:  [97 %-98 %] 97 %  BP: ()/(52-78) 106/65     Intake/Output - Last 3 Shifts       11/13 0700 - 11/14 0659 11/14 0700 - 11/15 0659 11/15 0700 - 11/16 0659    Urine (mL/kg/hr)       Stool       Total Output       Net              Urine Occurrence  5 x     Stool Occurrence  5 x           Physical Exam  Vitals signs and nursing note reviewed.   Constitutional:       General: She is not in acute distress.     Appearance: Normal appearance. She is normal weight. She is not ill-appearing.   HENT:      Head: Normocephalic and atraumatic.      Nose: Nose normal.      Mouth/Throat:      Mouth: Mucous membranes are moist.   Neck:      Musculoskeletal: Normal range of motion.   Cardiovascular:      Rate and Rhythm: Normal rate and regular rhythm.      Pulses: Normal  pulses.   Pulmonary:      Effort: Pulmonary effort is normal. No respiratory distress.   Abdominal:      Comments: Abdomen soft, non-distended  Appropriately tender to palpation; no signs of peritonitis  RLQ ileostomy with soft stool in appliance   Musculoskeletal: Normal range of motion.   Skin:     General: Skin is warm and dry.   Neurological:      General: No focal deficit present.      Mental Status: She is alert.   Psychiatric:         Mood and Affect: Mood normal.             Significant Labs:  CBC (Last 3 Results):   Recent Labs   Lab 11/13/20  0315 11/14/20  0246 11/15/20  0622   WBC 8.77 7.79 9.60   RBC 2.82* 2.76* 3.08*   HGB 7.6* 7.4* 8.3*   HCT 24.2* 23.6* 26.3*    360* 474*   MCV 86 86 85   MCH 27.0 26.8* 26.9*   MCHC 31.4* 31.4* 31.6*       Significant Diagnostics:  I have reviewed all pertinent imaging results/findings within the past 24 hours.    Assessment/Plan:     Kennedy Mitchell is a 25yoF with a past medical history significant for refractory ulcerative colitis s/p lap total colectomy with end ileostomy (6/2020) and completion proctectomy with IPAA and diverting loop ileostomy on 11/5/2020. The patient presented to an OSH with abdominal pain, nausea, vomiting. CT scan suggestive of an ileus and an incidental finding of a portal vein thrombus. Patient transferred for higher level of care.      - patient continues to improve  - Hgb stable this morning  - Increase to therapeutic Lovenox  - Heme/onc on board; appreciating recs  - Regular diet  - pain, nausea well controlled        Gregoria Montiel MD  General Surgery  [facility]

## 2020-11-15 NOTE — SUBJECTIVE & OBJECTIVE
Subjective:     Interval History: Patient seen and examined this morning. Some mild abdominal pain. Patient reports feeling better. Tolerating diet. Active, ambulating. Denies nausea, vomiting. Ostomy functioning. No other complaints at this time.     Post-Op Info:  * No surgery found *          Medications:  Continuous Infusions:    Scheduled Meds:   enoxaparin  40 mg Subcutaneous Q24H    gabapentin  300 mg Oral TID    tamsulosin  0.4 mg Oral Daily     PRN Meds:   sodium chloride    sodium chloride    acetaminophen    HYDROcodone-acetaminophen    HYDROcodone-acetaminophen    naloxone    ondansetron    prochlorperazine    sodium chloride 0.9%        Objective:     Vital Signs (Most Recent):  Temp: 98.1 °F (36.7 °C) (11/15/20 0801)  Pulse: 85 (11/15/20 0801)  Resp: 18 (11/15/20 0801)  BP: 106/65 (11/15/20 0801)  SpO2: 97 % (11/15/20 0801) Vital Signs (24h Range):  Temp:  [97.8 °F (36.6 °C)-99.5 °F (37.5 °C)] 98.1 °F (36.7 °C)  Pulse:  [75-91] 85  Resp:  [14-18] 18  SpO2:  [97 %-98 %] 97 %  BP: ()/(52-78) 106/65     Intake/Output - Last 3 Shifts       11/13 0700 - 11/14 0659 11/14 0700 - 11/15 0659 11/15 0700 - 11/16 0659    Urine (mL/kg/hr)       Stool       Total Output       Net              Urine Occurrence  5 x     Stool Occurrence  5 x           Physical Exam  Vitals signs and nursing note reviewed.   Constitutional:       General: She is not in acute distress.     Appearance: Normal appearance. She is normal weight. She is not ill-appearing.   HENT:      Head: Normocephalic and atraumatic.      Nose: Nose normal.      Mouth/Throat:      Mouth: Mucous membranes are moist.   Neck:      Musculoskeletal: Normal range of motion.   Cardiovascular:      Rate and Rhythm: Normal rate and regular rhythm.      Pulses: Normal pulses.   Pulmonary:      Effort: Pulmonary effort is normal. No respiratory distress.   Abdominal:      Comments: Abdomen soft, non-distended  Appropriately tender to palpation;  no signs of peritonitis  RLQ ileostomy with soft stool in appliance   Musculoskeletal: Normal range of motion.   Skin:     General: Skin is warm and dry.   Neurological:      General: No focal deficit present.      Mental Status: She is alert.   Psychiatric:         Mood and Affect: Mood normal.             Significant Labs:  CBC (Last 3 Results):   Recent Labs   Lab 11/13/20  0315 11/14/20  0246 11/15/20  0622   WBC 8.77 7.79 9.60   RBC 2.82* 2.76* 3.08*   HGB 7.6* 7.4* 8.3*   HCT 24.2* 23.6* 26.3*    360* 474*   MCV 86 86 85   MCH 27.0 26.8* 26.9*   MCHC 31.4* 31.4* 31.6*       Significant Diagnostics:  I have reviewed all pertinent imaging results/findings within the past 24 hours.

## 2020-11-15 NOTE — PLAN OF CARE
Problem: Adult Inpatient Plan of Care  Goal: Plan of Care Review  Outcome: Ongoing, Progressing     Problem: Adult Inpatient Plan of Care  Goal: Optimal Comfort and Wellbeing  Outcome: Ongoing, Progressing     Problem: Pain (Colostomy)  Goal: Acceptable Pain Control  Outcome: Ongoing, Progressing    POC reviewed with pt, pt verbalized understanding. Pt is AAOx4. VSS. No falls, injury as pt calls for assistance when needed. No s/s of breakdown as pt is independent with positioning self. Pain being monitored and controlled with PRN and scheduled meds. Pt denies any complaints of N/V. Pt performed ostomy care successfully. Frequent rounds made for pt safety and care. Safety precautions remain, bed in lowest position, side rails up x2, call light and personal belongings within reach. Will continue to monitor and follow plan of care.

## 2020-11-15 NOTE — ASSESSMENT & PLAN NOTE
Lottie Mitchell is a 25yoF with a past medical history significant for refractory ulcerative colitis s/p lap total colectomy with end ileostomy (6/2020) and completion proctectomy with IPAA and diverting loop ileostomy on 11/5/2020. The patient presented to an OSH with abdominal pain, nausea, vomiting. CT scan suggestive of an ileus and an incidental finding of a portal vein thrombus. Patient transferred for higher level of care.      - patient continues to improve  - Hgb stable this morning  - Increase to therapeutic Lovenox  - Heme/onc on board; appreciating recs  - Regular diet  - pain, nausea well controlled

## 2020-11-16 LAB
ALBUMIN SERPL BCP-MCNC: 3.9 G/DL (ref 3.5–5.2)
ALP SERPL-CCNC: 58 U/L (ref 55–135)
ALT SERPL W/O P-5'-P-CCNC: 50 U/L (ref 10–44)
ANION GAP SERPL CALC-SCNC: 12 MMOL/L (ref 8–16)
AST SERPL-CCNC: 39 U/L (ref 10–40)
BASOPHILS # BLD AUTO: 0.03 K/UL (ref 0–0.2)
BASOPHILS NFR BLD: 0.4 % (ref 0–1.9)
BILIRUB SERPL-MCNC: 0.6 MG/DL (ref 0.1–1)
BUN SERPL-MCNC: 11 MG/DL (ref 6–20)
CALCIUM SERPL-MCNC: 9.4 MG/DL (ref 8.7–10.5)
CHLORIDE SERPL-SCNC: 93 MMOL/L (ref 95–110)
CO2 SERPL-SCNC: 29 MMOL/L (ref 23–29)
CREAT SERPL-MCNC: 0.7 MG/DL (ref 0.5–1.4)
CRP SERPL-MCNC: 54.7 MG/L (ref 0–8.2)
DIFFERENTIAL METHOD: ABNORMAL
EOSINOPHIL # BLD AUTO: 0.3 K/UL (ref 0–0.5)
EOSINOPHIL NFR BLD: 4.5 % (ref 0–8)
ERYTHROCYTE [DISTWIDTH] IN BLOOD BY AUTOMATED COUNT: 14.3 % (ref 11.5–14.5)
EST. GFR  (AFRICAN AMERICAN): >60 ML/MIN/1.73 M^2
EST. GFR  (NON AFRICAN AMERICAN): >60 ML/MIN/1.73 M^2
GLUCOSE SERPL-MCNC: 90 MG/DL (ref 70–110)
HCT VFR BLD AUTO: 29.2 % (ref 37–48.5)
HGB BLD-MCNC: 9.1 G/DL (ref 12–16)
IMM GRANULOCYTES # BLD AUTO: 0.08 K/UL (ref 0–0.04)
IMM GRANULOCYTES NFR BLD AUTO: 1.1 % (ref 0–0.5)
LYMPHOCYTES # BLD AUTO: 1.2 K/UL (ref 1–4.8)
LYMPHOCYTES NFR BLD: 17 % (ref 18–48)
MCH RBC QN AUTO: 26.8 PG (ref 27–31)
MCHC RBC AUTO-ENTMCNC: 31.2 G/DL (ref 32–36)
MCV RBC AUTO: 86 FL (ref 82–98)
MONOCYTES # BLD AUTO: 0.9 K/UL (ref 0.3–1)
MONOCYTES NFR BLD: 12.3 % (ref 4–15)
NEUTROPHILS # BLD AUTO: 4.7 K/UL (ref 1.8–7.7)
NEUTROPHILS NFR BLD: 64.7 % (ref 38–73)
NRBC BLD-RTO: 0 /100 WBC
PLATELET # BLD AUTO: 463 K/UL (ref 150–350)
PMV BLD AUTO: 10.1 FL (ref 9.2–12.9)
POTASSIUM SERPL-SCNC: 3.5 MMOL/L (ref 3.5–5.1)
PROT SERPL-MCNC: 8 G/DL (ref 6–8.4)
RBC # BLD AUTO: 3.4 M/UL (ref 4–5.4)
SODIUM SERPL-SCNC: 134 MMOL/L (ref 136–145)
WBC # BLD AUTO: 7.29 K/UL (ref 3.9–12.7)

## 2020-11-16 PROCEDURE — 11000001 HC ACUTE MED/SURG PRIVATE ROOM

## 2020-11-16 PROCEDURE — 25000003 PHARM REV CODE 250: Performed by: STUDENT IN AN ORGANIZED HEALTH CARE EDUCATION/TRAINING PROGRAM

## 2020-11-16 PROCEDURE — 80053 COMPREHEN METABOLIC PANEL: CPT

## 2020-11-16 PROCEDURE — 86140 C-REACTIVE PROTEIN: CPT

## 2020-11-16 PROCEDURE — 25000003 PHARM REV CODE 250: Performed by: SURGERY

## 2020-11-16 PROCEDURE — 85025 COMPLETE CBC W/AUTO DIFF WBC: CPT

## 2020-11-16 PROCEDURE — 36415 COLL VENOUS BLD VENIPUNCTURE: CPT

## 2020-11-16 RX ORDER — SODIUM,POTASSIUM PHOSPHATES 280-250MG
2 POWDER IN PACKET (EA) ORAL ONCE
Status: COMPLETED | OUTPATIENT
Start: 2020-11-16 | End: 2020-11-16

## 2020-11-16 RX ADMIN — APIXABAN 5 MG: 5 TABLET, FILM COATED ORAL at 09:11

## 2020-11-16 RX ADMIN — HYDROCODONE BITARTRATE AND ACETAMINOPHEN 1 TABLET: 10; 325 TABLET ORAL at 09:11

## 2020-11-16 RX ADMIN — POTASSIUM & SODIUM PHOSPHATES POWDER PACK 280-160-250 MG 2 PACKET: 280-160-250 PACK at 03:11

## 2020-11-16 RX ADMIN — HYDROCODONE BITARTRATE AND ACETAMINOPHEN 1 TABLET: 10; 325 TABLET ORAL at 10:11

## 2020-11-16 RX ADMIN — HYDROCODONE BITARTRATE AND ACETAMINOPHEN 1 TABLET: 10; 325 TABLET ORAL at 04:11

## 2020-11-16 RX ADMIN — HYDROCODONE BITARTRATE AND ACETAMINOPHEN 1 TABLET: 10; 325 TABLET ORAL at 12:11

## 2020-11-16 RX ADMIN — APIXABAN 5 MG: 5 TABLET, FILM COATED ORAL at 10:11

## 2020-11-16 NOTE — PLAN OF CARE
No acute events overnight. Plan of care discussed with pt. VSS. Pain controlled with PO pain medications - see emar for further details. No acute distress noted, respirations even and unlabored. Fall and safety precautions in place. Will continue to monitor.       Problem: Adult Inpatient Plan of Care  Goal: Plan of Care Review  Outcome: Ongoing, Progressing     Problem: Pain (Colostomy)  Goal: Acceptable Pain Control  Outcome: Ongoing, Progressing

## 2020-11-16 NOTE — HPI
"25F with PMH medically refractory UC s/p laparoscopic total colectomy with end ileostomy (6/11/20), now s/p completion proctectomy with IPAA and diverting loop ileostomy (11/5/20). She did well in the hospital post-op and was discharged to home on 11/9/20 with Imodium BID for high ostomy output. She reports that even on the day she got home she had some abdominal pain and nausea. This worsened over the next day. She felt like she was vomiting all throughout the day and could not take it anymore. At times she reports her symptoms were so bad that she felt like she would "black out". She did no syncopize or fall. She has had some ostomy output but it is minimal since she has not kept anything down. She was seen at an OSH and had CT imaging which showed ileus to the level of the ostomy and right portal vein thrombus. On presentation she denies fever, chills, CP, SOB, dysuria, or hematuria.She was admitted for monitoring.   "

## 2020-11-16 NOTE — HPI
"25F with PMH medically refractory UC s/p laparoscopic total colectomy with end ileostomy (6/11/20), now s/p completion proctectomy with IPAA and diverting loop ileostomy (11/5/20). She did well in the hospital post-op and was discharged to home on 11/9/20 with Imodium BID for high ostomy output. She reports that even on the day she got home she had some abdominal pain and nausea. This worsened over the next day. She felt like she was vomiting all throughout the day and could not take it anymore. At times she reports her symptoms were so bad that she felt like she would "black out". She did no syncopize or fall. She has had some ostomy output but it is minimal since she has not kept anything down. She was seen at an OSH and had CT imaging which showed ileus to the level of the ostomy and right portal vein thrombus. On presentation she denies fever, chills, CP, SOB, dysuria, or hematuria.  "

## 2020-11-16 NOTE — PROGRESS NOTES
"[facility]  General Surgery  Progress Note    Subjective:     History of Present Illness:  25F with PMH medically refractory UC s/p laparoscopic total colectomy with end ileostomy (6/11/20), now s/p completion proctectomy with IPAA and diverting loop ileostomy (11/5/20). She did well in the hospital post-op and was discharged to home on 11/9/20 with Imodium BID for high ostomy output. She reports that even on the day she got home she had some abdominal pain and nausea. This worsened over the next day. She felt like she was vomiting all throughout the day and could not take it anymore. At times she reports her symptoms were so bad that she felt like she would "black out". She did no syncopize or fall. She has had some ostomy output but it is minimal since she has not kept anything down. She was seen at an OSH and had CT imaging which showed ileus to the level of the ostomy and right portal vein thrombus. On presentation she denies fever, chills, CP, SOB, dysuria, or hematuria.    Post-Op Info:  * No surgery found *         Interval History: NAEON. Tolerating regular diet with adequate ostomy output. Transitioned to therapeutic lovenox without drop in H&H, will transition to oral anticoagulation today.     Medications:  Continuous Infusions:  Scheduled Meds:   apixaban  5 mg Oral BID    gabapentin  300 mg Oral TID    tamsulosin  0.4 mg Oral Daily     PRN Meds:sodium chloride, sodium chloride, acetaminophen, HYDROcodone-acetaminophen, HYDROcodone-acetaminophen, naloxone, ondansetron, prochlorperazine, sodium chloride 0.9%     Review of patient's allergies indicates:  No Known Allergies  Objective:     Vital Signs (Most Recent):  Temp: 98.2 °F (36.8 °C) (11/16/20 0445)  Pulse: 84 (11/16/20 0445)  Resp: 17 (11/16/20 0445)  BP: 121/87 (11/16/20 0445)  SpO2: 98 % (11/16/20 0445) Vital Signs (24h Range):  Temp:  [97.9 °F (36.6 °C)-98.2 °F (36.8 °C)] 98.2 °F (36.8 °C)  Pulse:  [84-94] 84  Resp:  " [15-18] 17  SpO2:  [97 %-98 %] 98 %  BP: (101-125)/(65-89) 121/87     Weight: 56.7 kg (125 lb)  Body mass index is 20.8 kg/m².    Intake/Output - Last 3 Shifts       11/14 0700 - 11/15 0659 11/15 0700 - 11/16 0659 11/16 0700 - 11/17 0659           Urine Occurrence 5 x      Stool Occurrence 5 x            Physical Exam  Vitals signs and nursing note reviewed.   Constitutional:       Appearance: Normal appearance.   HENT:      Head: Normocephalic and atraumatic.   Cardiovascular:      Rate and Rhythm: Normal rate and regular rhythm.   Pulmonary:      Effort: Pulmonary effort is normal. No respiratory distress.   Abdominal:      General: Abdomen is flat. There is no distension.      Palpations: Abdomen is soft.      Tenderness: There is no abdominal tenderness. There is no guarding or rebound.      Comments: RLQ ostomy with soft stool output    Skin:     General: Skin is warm and dry.   Neurological:      General: No focal deficit present.      Mental Status: She is alert and oriented to person, place, and time.   Psychiatric:         Mood and Affect: Mood normal.         Behavior: Behavior normal.         Significant Labs:  CBC:   Recent Labs   Lab 11/16/20  0527   WBC 7.29   RBC 3.40*   HGB 9.1*   HCT 29.2*   *   MCV 86   MCH 26.8*   MCHC 31.2*     CMP:   Recent Labs   Lab 11/16/20  0527   GLU 90   CALCIUM 9.4   ALBUMIN 3.9   PROT 8.0   *   K 3.5   CO2 29   CL 93*   BUN 11   CREATININE 0.7   ALKPHOS 58   ALT 50*   AST 39   BILITOT 0.6       Significant Diagnostics:  I have reviewed all pertinent imaging results/findings within the past 24 hours.    Assessment/Plan:     Kennedy Mitchell is a 25yoF with a past medical history significant for refractory ulcerative colitis s/p lap total colectomy with end ileostomy (6/2020) and completion proctectomy with IPAA and diverting loop ileostomy on 11/5/2020. The patient presented to an OSH with abdominal pain, nausea, vomiting. CT scan suggestive of an ileus  and an incidental finding of a portal vein thrombus. Patient transferred for higher level of care.      - patient continues to improve  - Hgb stable this morning  - Transition to eliquis 5 mg BID this am, will monitor for change in H&H   - Heme/onc on board; appreciating recs - will need anticoagulation for at least 6 months with outpatient follow up   - Tolerating Regular diet   - pain, nausea well controlled    Dispo: Tentative discharge 11/17 if patient remains stable         Yajaira Alfredo MD  General Surgery  [facility]

## 2020-11-16 NOTE — SUBJECTIVE & OBJECTIVE
Interval History: NAEON. Tolerating regular diet with adequate ostomy output. Transitioned to therapeutic lovenox without drop in H&H, will transition to oral anticoagulation today.     Medications:  Continuous Infusions:  Scheduled Meds:   apixaban  5 mg Oral BID    gabapentin  300 mg Oral TID    tamsulosin  0.4 mg Oral Daily     PRN Meds:sodium chloride, sodium chloride, acetaminophen, HYDROcodone-acetaminophen, HYDROcodone-acetaminophen, naloxone, ondansetron, prochlorperazine, sodium chloride 0.9%     Review of patient's allergies indicates:  No Known Allergies  Objective:     Vital Signs (Most Recent):  Temp: 98.2 °F (36.8 °C) (11/16/20 0445)  Pulse: 84 (11/16/20 0445)  Resp: 17 (11/16/20 0445)  BP: 121/87 (11/16/20 0445)  SpO2: 98 % (11/16/20 0445) Vital Signs (24h Range):  Temp:  [97.9 °F (36.6 °C)-98.2 °F (36.8 °C)] 98.2 °F (36.8 °C)  Pulse:  [84-94] 84  Resp:  [15-18] 17  SpO2:  [97 %-98 %] 98 %  BP: (101-125)/(65-89) 121/87     Weight: 56.7 kg (125 lb)  Body mass index is 20.8 kg/m².    Intake/Output - Last 3 Shifts       11/14 0700 - 11/15 0659 11/15 0700 - 11/16 0659 11/16 0700 - 11/17 0659           Urine Occurrence 5 x      Stool Occurrence 5 x            Physical Exam  Vitals signs and nursing note reviewed.   Constitutional:       Appearance: Normal appearance.   HENT:      Head: Normocephalic and atraumatic.   Cardiovascular:      Rate and Rhythm: Normal rate and regular rhythm.   Pulmonary:      Effort: Pulmonary effort is normal. No respiratory distress.   Abdominal:      General: Abdomen is flat. There is no distension.      Palpations: Abdomen is soft.      Tenderness: There is no abdominal tenderness. There is no guarding or rebound.      Comments: RLQ ostomy with soft stool output    Skin:     General: Skin is warm and dry.   Neurological:      General: No focal deficit present.      Mental Status: She is alert and oriented to person, place, and time.   Psychiatric:         Mood and Affect:  Mood normal.         Behavior: Behavior normal.         Significant Labs:  CBC:   Recent Labs   Lab 11/16/20 0527   WBC 7.29   RBC 3.40*   HGB 9.1*   HCT 29.2*   *   MCV 86   MCH 26.8*   MCHC 31.2*     CMP:   Recent Labs   Lab 11/16/20 0527   GLU 90   CALCIUM 9.4   ALBUMIN 3.9   PROT 8.0   *   K 3.5   CO2 29   CL 93*   BUN 11   CREATININE 0.7   ALKPHOS 58   ALT 50*   AST 39   BILITOT 0.6       Significant Diagnostics:  I have reviewed all pertinent imaging results/findings within the past 24 hours.

## 2020-11-16 NOTE — PHYSICIAN QUERY
"PT Name: Alissa Mitchell  MR #: [id number]     Documentation Clarification      CDS/: Marlee Chopra RN, CDS               Contact information: [email or web address]      This form is a permanent document in the medical record.     Query Date: November 16, 2020    By submitting this query, we are merely seeking further clarification of documentation. Please utilize your independent clinical judgment when addressing the question(s) below.    The Medical Record reflects the following:    Supporting Clinical Findings Location in Medical Record   25F with PMH medically refractory UC s/p laparoscopic total colectomy with end ileostomy (6/11/20), now s/p completion proctectomy with IPAA and diverting loop ileostomy (11/5/20). She did well in the hospital post-op and was discharged to home on 11/9/20 with Imodium BID for high ostomy output. She reports that even on the day she got home she had some abdominal pain and nausea. This worsened over the next day. Yesterday she felt like she was vomiting all throughout the day and could not take it anymore. At times she reports her symptoms were so bad that she felt like she would "black out". She did no syncopize or fall. She has had some ostomy output but it is minimal since she has not kept anything down. She was seen at an OSH and had CT imaging which showed ileus to the level of the ostomy and right portal vein thrombus    NPO/IVF  Nausea control  If patient continues to have emesis, will require NGT placement for decompression    subjectively, patient endorses feeling better today  return of ileostomy function H&P 11/11                          H&P 11/11          CRS PN 11/12                                                                               Provider, please further specify the diagnosis of ileus     [   ] Ileus is not a complication of surgery (proctectomy w/ DLI 11/5), please further specify        [   ] Adynamic ileus        [   ] Paralytic ileus        " [   ] Other, please specify ______________       [   ] Clinically undetermined type of ileus      [ x  ]  Ileus is a complication of surgery (proctectomy w/ DLI 11/5), please further specify        [   ] Adynamic ileus        [   ] Paralytic ileus        [   ] Other, please specify ______________       [ x  ] Clinically undetermined type of ileus      [   ] Other clarification (please specify): ________________     [  ] Clinically undetermined                                                                                                           Present on admission (POA) status:   [ x  ] Yes (Y)                          [  ] Clinically Undetermined (W)  [   ] No (N)                            [   ] Documentation insufficient to determine if condition is POA (U)

## 2020-11-16 NOTE — ASSESSMENT & PLAN NOTE
Lottie Mitchell is a 25yoF with a past medical history significant for refractory ulcerative colitis s/p lap total colectomy with end ileostomy (6/2020) and completion proctectomy with IPAA and diverting loop ileostomy on 11/5/2020. The patient presented to an OSH with abdominal pain, nausea, vomiting. CT scan suggestive of an ileus and an incidental finding of a portal vein thrombus. Patient transferred for higher level of care.      - patient continues to improve  - Hgb stable this morning  - Transition to eliquis 5 mg BID this am, will monitor for change in H&H   - Heme/onc on board; appreciating recs - will need anticoagulation for at least 6 months with outpatient follow up   - Tolerating Regular diet   - pain, nausea well controlled    Dispo: Tentative discharge 11/17 if patient remains stable

## 2020-11-17 VITALS
OXYGEN SATURATION: 97 % | HEIGHT: 65 IN | WEIGHT: 125 LBS | DIASTOLIC BLOOD PRESSURE: 56 MMHG | HEART RATE: 77 BPM | SYSTOLIC BLOOD PRESSURE: 99 MMHG | TEMPERATURE: 98 F | RESPIRATION RATE: 16 BRPM | BODY MASS INDEX: 20.83 KG/M2

## 2020-11-17 PROBLEM — I81 PORTAL VEIN THROMBOSIS: Status: ACTIVE | Noted: 2020-11-17

## 2020-11-17 PROBLEM — K56.7 ILEUS: Status: RESOLVED | Noted: 2020-11-11 | Resolved: 2020-11-17

## 2020-11-17 LAB
ALBUMIN SERPL BCP-MCNC: 4 G/DL (ref 3.5–5.2)
ALP SERPL-CCNC: 57 U/L (ref 55–135)
ALT SERPL W/O P-5'-P-CCNC: 48 U/L (ref 10–44)
ANION GAP SERPL CALC-SCNC: 12 MMOL/L (ref 8–16)
AST SERPL-CCNC: 26 U/L (ref 10–40)
BASOPHILS # BLD AUTO: 0.05 K/UL (ref 0–0.2)
BASOPHILS NFR BLD: 0.5 % (ref 0–1.9)
BILIRUB SERPL-MCNC: 0.6 MG/DL (ref 0.1–1)
BUN SERPL-MCNC: 11 MG/DL (ref 6–20)
CALCIUM SERPL-MCNC: 9.2 MG/DL (ref 8.7–10.5)
CHLORIDE SERPL-SCNC: 93 MMOL/L (ref 95–110)
CO2 SERPL-SCNC: 27 MMOL/L (ref 23–29)
CREAT SERPL-MCNC: 0.8 MG/DL (ref 0.5–1.4)
CRP SERPL-MCNC: 46.3 MG/L (ref 0–8.2)
DIFFERENTIAL METHOD: ABNORMAL
EOSINOPHIL # BLD AUTO: 0.4 K/UL (ref 0–0.5)
EOSINOPHIL NFR BLD: 4.8 % (ref 0–8)
ERYTHROCYTE [DISTWIDTH] IN BLOOD BY AUTOMATED COUNT: 14.3 % (ref 11.5–14.5)
EST. GFR  (AFRICAN AMERICAN): >60 ML/MIN/1.73 M^2
EST. GFR  (NON AFRICAN AMERICAN): >60 ML/MIN/1.73 M^2
GLUCOSE SERPL-MCNC: 101 MG/DL (ref 70–110)
HCT VFR BLD AUTO: 29.5 % (ref 37–48.5)
HGB BLD-MCNC: 9.3 G/DL (ref 12–16)
IMM GRANULOCYTES # BLD AUTO: 0.11 K/UL (ref 0–0.04)
IMM GRANULOCYTES NFR BLD AUTO: 1.2 % (ref 0–0.5)
LYMPHOCYTES # BLD AUTO: 1.7 K/UL (ref 1–4.8)
LYMPHOCYTES NFR BLD: 18.4 % (ref 18–48)
MCH RBC QN AUTO: 27.1 PG (ref 27–31)
MCHC RBC AUTO-ENTMCNC: 31.5 G/DL (ref 32–36)
MCV RBC AUTO: 86 FL (ref 82–98)
MONOCYTES # BLD AUTO: 0.9 K/UL (ref 0.3–1)
MONOCYTES NFR BLD: 10 % (ref 4–15)
NEUTROPHILS # BLD AUTO: 6 K/UL (ref 1.8–7.7)
NEUTROPHILS NFR BLD: 65.1 % (ref 38–73)
NRBC BLD-RTO: 0 /100 WBC
PLATELET # BLD AUTO: 523 K/UL (ref 150–350)
PMV BLD AUTO: 10.4 FL (ref 9.2–12.9)
POTASSIUM SERPL-SCNC: 3.1 MMOL/L (ref 3.5–5.1)
PROT SERPL-MCNC: 7.8 G/DL (ref 6–8.4)
RBC # BLD AUTO: 3.43 M/UL (ref 4–5.4)
SODIUM SERPL-SCNC: 132 MMOL/L (ref 136–145)
WBC # BLD AUTO: 9.26 K/UL (ref 3.9–12.7)

## 2020-11-17 PROCEDURE — 25000003 PHARM REV CODE 250: Performed by: STUDENT IN AN ORGANIZED HEALTH CARE EDUCATION/TRAINING PROGRAM

## 2020-11-17 PROCEDURE — 36415 COLL VENOUS BLD VENIPUNCTURE: CPT

## 2020-11-17 PROCEDURE — 85025 COMPLETE CBC W/AUTO DIFF WBC: CPT

## 2020-11-17 PROCEDURE — 86140 C-REACTIVE PROTEIN: CPT

## 2020-11-17 PROCEDURE — 80053 COMPREHEN METABOLIC PANEL: CPT

## 2020-11-17 PROCEDURE — 25000003 PHARM REV CODE 250: Performed by: SURGERY

## 2020-11-17 RX ORDER — HYDROCODONE BITARTRATE AND ACETAMINOPHEN 5; 325 MG/1; MG/1
1 TABLET ORAL EVERY 6 HOURS PRN
Qty: 18 TABLET | Refills: 0 | Status: SHIPPED | OUTPATIENT
Start: 2020-11-17 | End: 2020-12-14 | Stop reason: CLARIF

## 2020-11-17 RX ORDER — POTASSIUM CHLORIDE 1.5 G/1.58G
40 POWDER, FOR SOLUTION ORAL EVERY 8 HOURS
Status: DISCONTINUED | OUTPATIENT
Start: 2020-11-17 | End: 2020-11-17 | Stop reason: HOSPADM

## 2020-11-17 RX ORDER — HYDROCODONE BITARTRATE AND ACETAMINOPHEN 5; 325 MG/1; MG/1
1 TABLET ORAL EVERY 6 HOURS PRN
Qty: 16 TABLET | Refills: 0 | Status: SHIPPED | OUTPATIENT
Start: 2020-11-17 | End: 2020-11-17

## 2020-11-17 RX ADMIN — POTASSIUM CHLORIDE 40 MEQ: 1.5 FOR SOLUTION ORAL at 09:11

## 2020-11-17 RX ADMIN — HYDROCODONE BITARTRATE AND ACETAMINOPHEN 1 TABLET: 10; 325 TABLET ORAL at 09:11

## 2020-11-17 RX ADMIN — APIXABAN 5 MG: 5 TABLET, FILM COATED ORAL at 09:11

## 2020-11-17 NOTE — NURSING
Pt received discharge instructions. Verbalized understanding of all instructions. Prescriptions delivered to bedside via pharmacy. PIV removed, no redness/swelling. Awaiting wheelchair for transport.

## 2020-11-17 NOTE — HOSPITAL COURSE
Patient was admitted 11/11 from ER for management of post-operative ileus as well as portal vein thrombosis. She was seen and evaluated by hematologya and oncology 11/11 as well, and placed on therapeutic anticoagulation. She had an acute drop in Hgb which responded adequately to 1 uPRBC. She continued to progress with diet and ostomy output and was transitioned to oral anticoagulation with good tolerance. She will be discharged home with outpatient follow up in CRS clinic and with hematology and oncology.

## 2020-11-17 NOTE — DISCHARGE SUMMARY
"[facility]  General Surgery  Discharge Summary      Patient Name: Alissa Mitchell  MRN: [id number]  Admission Date: 11/11/2020  Hospital Length of Stay: 6 days  Discharge Date and Time:  11/17/2020 11:04 AM  Attending Physician: Delia Mehta MD   Discharging Provider: Yajaira Alfredo MD  Primary Care Provider: Primary Doctor No    HPI:   25F with PMH medically refractory UC s/p laparoscopic total colectomy with end ileostomy (6/11/20), now s/p completion proctectomy with IPAA and diverting loop ileostomy (11/5/20). She did well in the hospital post-op and was discharged to home on 11/9/20 with Imodium BID for high ostomy output. She reports that even on the day she got home she had some abdominal pain and nausea. This worsened over the next day. She felt like she was vomiting all throughout the day and could not take it anymore. At times she reports her symptoms were so bad that she felt like she would "black out". She did no syncopize or fall. She has had some ostomy output but it is minimal since she has not kept anything down. She was seen at an OSH and had CT imaging which showed ileus to the level of the ostomy and right portal vein thrombus. On presentation she denies fever, chills, CP, SOB, dysuria, or hematuria.    * No surgery found *      Indwelling Lines/Drains at time of discharge:   Lines/Drains/Airways     Drain                 Ileostomy 06/11/20 Standard (Baylee, shane)  days         Rectal Tube 11/05/20 1756 other (see comments) 11 days              Hospital Course: Patient was admitted 11/11 from ER for management of post-operative ileus as well as portal vein thrombosis. She was seen and evaluated by hematologya and oncology 11/11 as well, and placed on therapeutic anticoagulation. She had an acute drop in Hgb which responded adequately to 1 uPRBC. She continued to progress with diet and ostomy output and was transitioned to oral anticoagulation with good tolerance. She " will be discharged home with outpatient follow up in CRS clinic and with hematology and oncology.     Consults:   Consults (From admission, onward)        Status Ordering Provider     Inpatient consult to Colorectal Surgery  Once     Provider:  (Not yet assigned)    Completed KENNEDY LIMON     Inpatient consult to Hematology/Oncology  Once     Provider:  (Not yet assigned)    Completed CECELIA SETH     Inpatient consult to Midline team  Once     Provider:  (Not yet assigned)    Completed DELIA MEHTA          Significant Diagnostic Studies: Labs:   CMP   Recent Labs   Lab 11/16/20 0527 11/17/20  0234   * 132*   K 3.5 3.1*   CL 93* 93*   CO2 29 27   GLU 90 101   BUN 11 11   CREATININE 0.7 0.8   CALCIUM 9.4 9.2   PROT 8.0 7.8   ALBUMIN 3.9 4.0   BILITOT 0.6 0.6   ALKPHOS 58 57   AST 39 26   ALT 50* 48*   ANIONGAP 12 12   ESTGFRAFRICA >60.0 >60.0   EGFRNONAA >60.0 >60.0    and CBC   Recent Labs   Lab 11/16/20 0527 11/17/20  0234   WBC 7.29 9.26   HGB 9.1* 9.3*   HCT 29.2* 29.5*   * 523*       Pending Diagnostic Studies:     None        Final Active Diagnoses:    Diagnosis Date Noted POA    PRINCIPAL PROBLEM:  Ileus [K56.7] 11/11/2020 Yes    Portal vein thrombosis [I81] 11/17/2020 Yes      Problems Resolved During this Admission:      Discharged Condition: good    Disposition: Home or Self Care    Follow Up:  Follow-up Information     Delia Mehta MD In 2 weeks.    Specialty: Colon and Rectal Surgery  Contact information:  [address] [place] [address] [phone]                 Patient Instructions:      No dressing needed     Activity as tolerated     Shower on day dressing removed (No bath)     Medications:  Reconciled Home Medications:      Medication List      START taking these medications    ELIQUIS 5 mg Tab  Generic drug: apixaban  Take 1 tablet (5 mg total) by mouth 2 (two) times daily.     HYDROcodone-acetaminophen 5-325 mg per tablet  Commonly known as:  NORCO  Take 1 tablet by mouth every 6 (six) hours as needed.        CONTINUE taking these medications    acetaminophen 500 MG tablet  Commonly known as: TYLENOL  Take 2 tablets (1,000 mg total) by mouth every 8 (eight) hours.     gabapentin 300 MG capsule  Commonly known as: NEURONTIN  Take 1 capsule (300 mg total) by mouth 3 (three) times daily.     ibuprofen 800 MG tablet  Commonly known as: ADVIL,MOTRIN  Take 1 tablet (800 mg total) by mouth every 8 (eight) hours.     loperamide 2 mg capsule  Commonly known as: IMODIUM  Take 1 capsule (2 mg total) by mouth 2 (two) times a day.     ondansetron 4 MG Tbdl  Commonly known as: ZOFRAN-ODT  Take 1 tablet (4 mg total) by mouth every 6 (six) hours as needed.     oxyCODONE 5 MG immediate release tablet  Commonly known as: ROXICODONE  Take 1 tablet (5 mg total) by mouth every 6 (six) hours as needed for Pain.          Time spent on the discharge of patient: 30 minutes    Yajaira Alfredo MD  General Surgery  [facility]

## 2020-11-17 NOTE — PLAN OF CARE
11/17/20 1137   Final Note   Assessment Type Final Discharge Note   Anticipated Discharge Disposition Home   What phone number can be called within the next 1-3 days to see how you are doing after discharge? [phone]   Hospital Follow Up  Appt(s) scheduled? Yes   Discharge plans and expectations educations in teach back method with documentation complete? Yes     Future Appointments   Date Time Provider Department Center   11/25/2020 11:00 AM MARSHALL Lao [facility] [address]   11/25/2020 11:00 AM Delia Mehta MD [facility] M[facility] [address]

## 2020-11-18 ENCOUNTER — PATIENT MESSAGE (OUTPATIENT)
Dept: SURGERY | Facility: CLINIC | Age: 25
End: 2020-11-18

## 2020-11-18 ENCOUNTER — NURSE TRIAGE (OUTPATIENT)
Dept: ADMINISTRATIVE | Facility: CLINIC | Age: 25
End: 2020-11-18

## 2020-11-18 NOTE — TELEPHONE ENCOUNTER
Patient on [facility] post procedure call back symptom tracker.  Patient had hospital encounter on 11-  No contact required

## 2020-11-19 ENCOUNTER — PATIENT OUTREACH (OUTPATIENT)
Dept: ADMINISTRATIVE | Facility: CLINIC | Age: 25
End: 2020-11-19

## 2020-11-19 LAB
MPNR  FINAL DIAGNOSIS: NORMAL
MPNR  SPECIMEN TYPE: NORMAL
MPNR RESULT: NORMAL

## 2020-11-19 NOTE — PROGRESS NOTES
C3 nurse attempted to contact patient. The following occurred:   C3 nurse attempted to contact Alissa Mitchell for a TCC post hospital discharge follow up call. The patient is unable to conduct the call @ this time. The patient requested a callback.    The patient has a scheduled HOSFU appointment with Carlos Mcghee MD on 12/1 @ 2:30 and Delia Mehta MD on 12/2 @ 9:20; the patient does not have PCP.

## 2020-11-19 NOTE — PATIENT INSTRUCTIONS
Small Bowel Obstruction     Small bowel obstruction can lead to tissue damage and even tissue death.   A small bowel obstruction occurs when part or all of the small intestine (bowel) is blocked. As a result, digestive contents cant move through the bowel properly and out of the body. Treatment is needed right away to remove the blockage. This can ease painful symptoms. It can also prevent serious problems, such as tissue death or bursting (rupture) of the small bowel. Without treatment, a small bowel obstruction can be fatal.  Causes of small bowel obstruction  A small bowel obstruction can be caused by:  · Scar tissue (adhesions). These may form after belly (abdominal) surgery or an infection.  · Hernia. A hernia is when an organ pushes through a weak spot or tear in the abdomen wall. Part of the small bowel can push out and be seen as a bulge under the belly. Hernias can also occur internally.  · Certain health problems. These include when part of the bowel slides inside another part (intussusception). Other causes include irritable bowel disease such as Crohns disease, and inflammation and sores in the intestine (ulcerative colitis).  · Abnormal tissue growths (tumors). These can form on the inside or outside of the small bowel. They are usually due to cancer.  Symptoms of small bowel obstruction  Common symptoms include:  · Belly cramping and pain  · Belly swelling and bloating  · Upset stomach (nausea) and vomiting  · Can't  pass gas  · Can't pass stool (constipation)  · Diarrhea  Diagnosing small bowel obstruction  Your provider will ask about your symptoms and health history. Youll also have a physical exam. Tests may also be done to confirm the problem. These can include:  · Imaging tests. These provide pictures of the small bowel. Common tests include X-rays and a CT scan.  · Blood tests. These check for infection and other problems, such as excess fluid loss (dehydration).  · Upper GI  (gastrointestinal) series with a small bowel follow-through. This test takes X-rays of the upper digestive tract from the mouth through the small bowel. An X-ray dye (contrast fluid) is used. The dye coats the inside of your upper digestive tract so it will show up clearly on X-rays.  Treating small bowel obstruction  Treatment takes place in a hospital. As part of your care, the following may be done:  · No food or drink is given by mouth. This allows your bowels to rest.  · An IV (intravenous) line is placed in a vein in your arm or hand. The IV line is used to give fluids. It may also be used to give medicines. These may be needed to ease pain, nausea, and other symptoms. They may also be needed to treat or prevent infections.  · A soft, thin, flexible tube (nasogastric tube) is inserted through your nose and into your stomach. The tube is used to remove extra gas and fluid in your stomach and bowels. This helps to ease symptoms such as pain and swelling.  · In severe cases, surgery is done. This may be needed if the small bowel is almost or totally blocked, or there is a hole in the bowel (bowel perforation). During surgery, the blockage is removed. Parts of the bowel may also be removed if there is tissue death. Other repair may be done as well, depending on what caused the blockage. Your healthcare provider will give you more information about surgery, if needed.  · Youll be watched closely in the hospital until your symptoms improve. Your provider will tell you when you can go home.  Long-term concerns   After treatment, most people recover with no lasting effects. If a long part of the bowel is removed, there is a greater chance for lifelong digestive problems. Bowel movements may become irregular. Work with your provider to learn the best ways to manage any symptoms you may have, and to protect your health.  When to call your healthcare provider  Call your provider right away if you have any of the  following:  · Severe pain (Call 911)  · Belly swelling or cramping that wont go away  · Cant pass stool or gas  · Nausea or vomiting (especially if the vomit looks or smells like stool)   Date Last Reviewed: 7/1/2016  © 3062-7419 [employer]. [address] [place] [address], [place] [address]. All rights reserved. This information is not intended as a substitute for professional medical care. Always follow your healthcare professional's instructions.

## 2020-11-19 NOTE — PROGRESS NOTES
C3 nurse spoke with Alissa Mitchell for a TCC post hospital discharge follow up call. The patient does not have a scheduled HOSFU appointment with PCP within 7-14 days post hospital discharge date 11/17. C3 nurse was unable to schedule HOSFU appointment in [facility] d/t no PCP.  The patient has a scheduled HOSFU appointment with Carlos Mcghee MD on 12/1 @ 2:30 and Delia Mehta MD on 12/2 @ 9:20.

## 2020-11-23 ENCOUNTER — PATIENT MESSAGE (OUTPATIENT)
Dept: SURGERY | Facility: CLINIC | Age: 25
End: 2020-11-23

## 2020-11-24 DIAGNOSIS — D64.9 ANEMIA, UNSPECIFIED TYPE: Primary | ICD-10-CM

## 2020-11-30 NOTE — PROGRESS NOTES
"CRS Office Visit Follow-up    SUBJECTIVE:     History of Present Illness:  Patient is a 25 y.o. female who presents following laparoscopic completion proctectomy w/ IPAA for ulcerative colitis on 11/5/2020. Their post-operative course was complicated by readmission for ileus with incidental finding of portal vein thrombus.    She is doing fairly well today.  Had a full set of labs yesterday, hemoglobin up to 10.5, white blood cell count and CMP normal.  Still feels a pressure/throbbing feeling near her anus, notices that this is sometimes more prominent right before she empties some fluid from her pouch on the toilet.  She is able to sit in more positions and laid flat to sleep, but the sensation still bothers her.  Doing well with her ileostomy, pouch days on for 3 days despite liquidy output.    Final pathology:  A SPECIMEN DESIGNATED "ILEOSTOMY":   - Portions of benign small bowel with serosal adhesions and acute serositis.   - Negative for malignancy.   B. RECTUM, LAPAROSCOPIC PROCTECTOMY WITH ILEOANAL ANASTOMOSIS, ILEAL POUCH   CREATION AND ILEOSTOMY CREATION:   - Diffuse chronic active proctitis with reactive atypia, consistent with clinical history of ulcerative colitis; negative for dysplasla or malignancy.   - Thirty-six(36) benign reactive lymph nodes, negative for tumor.   - Surgical margins showing benign colon with focal mucosal ulceration, surface acute inflammation, transmural lymphoid aggregates and focal acute serositis.    Review of Systems:  ROS    OBJECTIVE:     Vital Signs (Most Recent)  /62 (BP Location: Left arm, Patient Position: Sitting, BP Method: Large (Automatic))   Pulse 82   Ht 5' 5" (1.651 m)   Wt 52.3 kg (115 lb 4.8 oz)   LMP 11/11/2020 (Exact Date)   BMI 19.19 kg/m²     Physical Exam:  General: White female in no distress   Neuro: Alert and oriented x 4.  Moves all extremities.     HEENT: No icterus.  Trachea midline  Respiratory: Respirations are even and " unlabored  Cardiac: Regular rate  Abdomen:  Incisions clean dry and intact, right lower quadrant ileostomy pink and healthy with intact skin circumferentially  Extremities: Warm dry and intact  Skin: No rashes      ASSESSMENT/PLAN:     24yo F s/p laparoscopic completion proctectomy w/ IPAA for ulcerative colitis on 11/5/2020, overall doing well but having some anal discomfort.  Discussed that this may be from the proximity of her staple line to the dentate line.  Would like to do an exam under anesthesia and flexible sigmoidoscopy for full evaluation of this before considering ileostomy reversal next month.  Consent was signed for this procedure today in the office.  Asked if she had any additional questions, none at this time.    Delia Mehta MD  Staff Surgeon, Colon and Rectal Surgery  [facility]

## 2020-11-30 NOTE — H&P (VIEW-ONLY)
"CRS Office Visit Follow-up    SUBJECTIVE:     History of Present Illness:  Patient is a 25 y.o. female who presents following laparoscopic completion proctectomy w/ IPAA for ulcerative colitis on 11/5/2020. Their post-operative course was complicated by readmission for ileus with incidental finding of portal vein thrombus.    She is doing fairly well today.  Had a full set of labs yesterday, hemoglobin up to 10.5, white blood cell count and CMP normal.  Still feels a pressure/throbbing feeling near her anus, notices that this is sometimes more prominent right before she empties some fluid from her pouch on the toilet.  She is able to sit in more positions and laid flat to sleep, but the sensation still bothers her.  Doing well with her ileostomy, pouch days on for 3 days despite liquidy output.    Final pathology:  A SPECIMEN DESIGNATED "ILEOSTOMY":   - Portions of benign small bowel with serosal adhesions and acute serositis.   - Negative for malignancy.   B. RECTUM, LAPAROSCOPIC PROCTECTOMY WITH ILEOANAL ANASTOMOSIS, ILEAL POUCH   CREATION AND ILEOSTOMY CREATION:   - Diffuse chronic active proctitis with reactive atypia, consistent with clinical history of ulcerative colitis; negative for dysplasla or malignancy.   - Thirty-six(36) benign reactive lymph nodes, negative for tumor.   - Surgical margins showing benign colon with focal mucosal ulceration, surface acute inflammation, transmural lymphoid aggregates and focal acute serositis.    Review of Systems:  ROS    OBJECTIVE:     Vital Signs (Most Recent)  /62 (BP Location: Left arm, Patient Position: Sitting, BP Method: Large (Automatic))   Pulse 82   Ht 5' 5" (1.651 m)   Wt 52.3 kg (115 lb 4.8 oz)   LMP 11/11/2020 (Exact Date)   BMI 19.19 kg/m²     Physical Exam:  General: White female in no distress   Neuro: Alert and oriented x 4.  Moves all extremities.     HEENT: No icterus.  Trachea midline  Respiratory: Respirations are even and " unlabored  Cardiac: Regular rate  Abdomen:  Incisions clean dry and intact, right lower quadrant ileostomy pink and healthy with intact skin circumferentially  Extremities: Warm dry and intact  Skin: No rashes      ASSESSMENT/PLAN:     26yo F s/p laparoscopic completion proctectomy w/ IPAA for ulcerative colitis on 11/5/2020, overall doing well but having some anal discomfort.  Discussed that this may be from the proximity of her staple line to the dentate line.  Would like to do an exam under anesthesia and flexible sigmoidoscopy for full evaluation of this before considering ileostomy reversal next month.  Consent was signed for this procedure today in the office.  Asked if she had any additional questions, none at this time.    Delia Mehta MD  Staff Surgeon, Colon and Rectal Surgery  [facility]

## 2020-12-01 ENCOUNTER — PATIENT MESSAGE (OUTPATIENT)
Dept: SURGERY | Facility: CLINIC | Age: 25
End: 2020-12-01

## 2020-12-01 ENCOUNTER — OFFICE VISIT (OUTPATIENT)
Dept: HEMATOLOGY/ONCOLOGY | Facility: CLINIC | Age: 25
End: 2020-12-01
Payer: MEDICAID

## 2020-12-01 ENCOUNTER — LAB VISIT (OUTPATIENT)
Dept: LAB | Facility: HOSPITAL | Age: 25
End: 2020-12-01
Payer: MEDICAID

## 2020-12-01 VITALS
OXYGEN SATURATION: 100 % | SYSTOLIC BLOOD PRESSURE: 101 MMHG | DIASTOLIC BLOOD PRESSURE: 65 MMHG | RESPIRATION RATE: 17 BRPM | HEIGHT: 65 IN | HEART RATE: 92 BPM | TEMPERATURE: 99 F | BODY MASS INDEX: 19.5 KG/M2 | WEIGHT: 117.06 LBS

## 2020-12-01 DIAGNOSIS — I81 PORTAL VEIN THROMBOSIS: Primary | ICD-10-CM

## 2020-12-01 DIAGNOSIS — K51.018 ULCERATIVE PANCOLITIS WITH OTHER COMPLICATION: ICD-10-CM

## 2020-12-01 DIAGNOSIS — I81 PORTAL VEIN THROMBOSIS: ICD-10-CM

## 2020-12-01 LAB
ALBUMIN SERPL BCP-MCNC: 4.3 G/DL (ref 3.5–5.2)
ALP SERPL-CCNC: 49 U/L (ref 55–135)
ALT SERPL W/O P-5'-P-CCNC: 18 U/L (ref 10–44)
ANION GAP SERPL CALC-SCNC: 11 MMOL/L (ref 8–16)
AST SERPL-CCNC: 16 U/L (ref 10–40)
BASOPHILS # BLD AUTO: 0.05 K/UL (ref 0–0.2)
BASOPHILS NFR BLD: 0.5 % (ref 0–1.9)
BILIRUB SERPL-MCNC: 0.6 MG/DL (ref 0.1–1)
BUN SERPL-MCNC: 17 MG/DL (ref 6–20)
CALCIUM SERPL-MCNC: 9.3 MG/DL (ref 8.7–10.5)
CHLORIDE SERPL-SCNC: 108 MMOL/L (ref 95–110)
CO2 SERPL-SCNC: 23 MMOL/L (ref 23–29)
CREAT SERPL-MCNC: 0.8 MG/DL (ref 0.5–1.4)
DIFFERENTIAL METHOD: ABNORMAL
EOSINOPHIL # BLD AUTO: 0.2 K/UL (ref 0–0.5)
EOSINOPHIL NFR BLD: 1.5 % (ref 0–8)
ERYTHROCYTE [DISTWIDTH] IN BLOOD BY AUTOMATED COUNT: 14.6 % (ref 11.5–14.5)
EST. GFR  (AFRICAN AMERICAN): >60 ML/MIN/1.73 M^2
EST. GFR  (NON AFRICAN AMERICAN): >60 ML/MIN/1.73 M^2
FERRITIN SERPL-MCNC: 65 NG/ML (ref 20–300)
GLUCOSE SERPL-MCNC: 82 MG/DL (ref 70–110)
HCT VFR BLD AUTO: 35.4 % (ref 37–48.5)
HGB BLD-MCNC: 10.5 G/DL (ref 12–16)
IMM GRANULOCYTES # BLD AUTO: 0.04 K/UL (ref 0–0.04)
IMM GRANULOCYTES NFR BLD AUTO: 0.4 % (ref 0–0.5)
IRON SERPL-MCNC: 36 UG/DL (ref 30–160)
LYMPHOCYTES # BLD AUTO: 1.9 K/UL (ref 1–4.8)
LYMPHOCYTES NFR BLD: 19.4 % (ref 18–48)
MCH RBC QN AUTO: 25.5 PG (ref 27–31)
MCHC RBC AUTO-ENTMCNC: 29.7 G/DL (ref 32–36)
MCV RBC AUTO: 86 FL (ref 82–98)
MONOCYTES # BLD AUTO: 0.5 K/UL (ref 0.3–1)
MONOCYTES NFR BLD: 5.4 % (ref 4–15)
NEUTROPHILS # BLD AUTO: 7.2 K/UL (ref 1.8–7.7)
NEUTROPHILS NFR BLD: 72.8 % (ref 38–73)
NRBC BLD-RTO: 0 /100 WBC
PLATELET # BLD AUTO: 489 K/UL (ref 150–350)
PMV BLD AUTO: 10.2 FL (ref 9.2–12.9)
POTASSIUM SERPL-SCNC: 4.1 MMOL/L (ref 3.5–5.1)
PROT SERPL-MCNC: 7.6 G/DL (ref 6–8.4)
RBC # BLD AUTO: 4.11 M/UL (ref 4–5.4)
SATURATED IRON: 7 % (ref 20–50)
SODIUM SERPL-SCNC: 142 MMOL/L (ref 136–145)
TOTAL IRON BINDING CAPACITY: 519 UG/DL (ref 250–450)
TRANSFERRIN SERPL-MCNC: 351 MG/DL (ref 200–375)
WBC # BLD AUTO: 9.84 K/UL (ref 3.9–12.7)

## 2020-12-01 PROCEDURE — 99215 OFFICE O/P EST HI 40 MIN: CPT | Mod: S$PBB,,, | Performed by: STUDENT IN AN ORGANIZED HEALTH CARE EDUCATION/TRAINING PROGRAM

## 2020-12-01 PROCEDURE — 80053 COMPREHEN METABOLIC PANEL: CPT

## 2020-12-01 PROCEDURE — 36415 COLL VENOUS BLD VENIPUNCTURE: CPT

## 2020-12-01 PROCEDURE — 99215 PR OFFICE/OUTPT VISIT, EST, LEVL V, 40-54 MIN: ICD-10-PCS | Mod: S$PBB,,, | Performed by: STUDENT IN AN ORGANIZED HEALTH CARE EDUCATION/TRAINING PROGRAM

## 2020-12-01 PROCEDURE — 85025 COMPLETE CBC W/AUTO DIFF WBC: CPT

## 2020-12-01 PROCEDURE — 83540 ASSAY OF IRON: CPT

## 2020-12-01 PROCEDURE — 99999 PR PBB SHADOW E&M-EST. PATIENT-LVL III: CPT | Mod: PBBFAC,,, | Performed by: STUDENT IN AN ORGANIZED HEALTH CARE EDUCATION/TRAINING PROGRAM

## 2020-12-01 PROCEDURE — 82728 ASSAY OF FERRITIN: CPT

## 2020-12-01 PROCEDURE — 99213 OFFICE O/P EST LOW 20 MIN: CPT | Mod: PBBFAC | Performed by: STUDENT IN AN ORGANIZED HEALTH CARE EDUCATION/TRAINING PROGRAM

## 2020-12-01 PROCEDURE — 99999 PR PBB SHADOW E&M-EST. PATIENT-LVL III: ICD-10-PCS | Mod: PBBFAC,,, | Performed by: STUDENT IN AN ORGANIZED HEALTH CARE EDUCATION/TRAINING PROGRAM

## 2020-12-01 NOTE — Clinical Note
Please schedule CBC, CMP, Iron studies- Ferritin, TIBC and Iron now  Please schedule a follow up appointment in 3 months with CBC and CMP

## 2020-12-01 NOTE — PROGRESS NOTES
PATIENT: Alissa Mitchell  MRN: [id number]  DATE: 12/1/2020      Diagnosis:   1. Portal vein thrombosis    2. Ulcerative pancolitis with other complication        Chief Complaint: No chief complaint on file.    Ms. Mitchell is a 25-year-old female with a past medical history of refractory ulcerative colitis status post colectomy with end ileostomy on June 11, 2020, proctectomy on November 5, 2020 presented to the hospital with abdominal pain and nausea and was diagnosed with portal vein thrombosis and was started on Eliquis     She has been on Eliquis for 2 weeks and c/o spotting for 2 weeks. She has IUD in place  She denied any blood in J-pouch  She c/o fatigue and 2 pre-syncopal episodes in the last 2 weeks    History  Patient had proctectomy on November 5, 2020 and discharged from the hospital with Imodium b.i.d. for high ostomy output.  Over the next day she complained of nausea, vomiting and abdominal pain and was seen at an outside hospital and a CT imaging revealed ileus and right portal vein thrombosis and was started on heparin drip.  She was transferred to [facility] ER for higher level of care.    Subjective:    Initial History: Ms. Mitchell is a 25 y.o. female who returns for follow up.     Past Medical History:   Past Medical History:   Diagnosis Date    ADD (attention deficit disorder)     Anxiety     Chronic anemia     Murmur     Ulcerative colitis        Past Surgical HIstory:   Past Surgical History:   Procedure Laterality Date    LAPAROSCOPIC PROCTOCOLECTOMY WITH ILEOANAL ANASTOMOSIS, CREATION OF ILEAL POUCH, AND ILEOSTOMY N/A 11/5/2020    Procedure: PROCTECTOMY, LAPAROSCOPIC, WITH ILEOANAL ANASTOMOSIS, ILEAL POUCH CREATION, AND ILEOSTOMY CREATION, ERAS low;  Surgeon: Delia Mehta MD;  Location: [facility] OR [address] [place] [address];  Service: Colon and Rectal;  Laterality: N/A;    LAPAROSCOPIC TOTAL COLECTOMY N/A 6/11/2020    Procedure: COLECTOMY, TOTAL, LAPAROSCOPIC, ERAS high and total abdominal  colectomy;  Surgeon: Delia Mehta MD;  Location: [facility] OR [address] [place] [address];  Service: Colon and Rectal;  Laterality: N/A;       Family History: History reviewed. No pertinent family history.    Social History:  reports that she has never smoked. She has never used smokeless tobacco.    Allergies:  Review of patient's allergies indicates:  No Known Allergies    Medications:  Current Outpatient Medications   Medication Sig Dispense Refill    acetaminophen (TYLENOL) 500 MG tablet Take 2 tablets (1,000 mg total) by mouth every 8 (eight) hours. 30 tablet 0    apixaban (ELIQUIS) 5 mg Tab Take 1 tablet (5 mg total) by mouth 2 (two) times daily. 60 tablet 3    gabapentin (NEURONTIN) 300 MG capsule Take 1 capsule (300 mg total) by mouth 3 (three) times daily. 90 capsule 11    HYDROcodone-acetaminophen (NORCO) 5-325 mg per tablet Take 1 tablet by mouth every 6 (six) hours as needed. 18 tablet 0    ibuprofen (ADVIL,MOTRIN) 800 MG tablet Take 1 tablet (800 mg total) by mouth every 8 (eight) hours. 30 tablet 0    loperamide (IMODIUM) 2 mg capsule Take 1 capsule (2 mg total) by mouth 2 (two) times a day. 60 capsule 0    ondansetron (ZOFRAN-ODT) 4 MG TbDL Take 1 tablet (4 mg total) by mouth every 6 (six) hours as needed. 20 tablet 0    oxyCODONE (ROXICODONE) 5 MG immediate release tablet Take 1 tablet (5 mg total) by mouth every 6 (six) hours as needed for Pain. 20 tablet 0     No current facility-administered medications for this visit.        Review of Systems   Constitutional: Positive for fatigue. Negative for appetite change, chills and fever.   HENT: Negative for nosebleeds and sore throat.    Respiratory: Negative for cough and shortness of breath.    Cardiovascular: Negative for chest pain and leg swelling.   Gastrointestinal: Negative for abdominal pain and blood in stool.        No blood in J-pouch   Genitourinary: Negative for flank pain and hematuria.   Musculoskeletal: Negative for gait problem.   Skin: Negative  "for rash.   Neurological: Negative for speech difficulty and headaches.   Hematological: Negative for adenopathy. Does not bruise/bleed easily.   Psychiatric/Behavioral: Negative for agitation and behavioral problems.       ECOG Performance Status: 0   Objective:      Vitals:   Vitals:    12/01/20 1425   BP: 101/65   BP Location: Left arm   Patient Position: Sitting   BP Method: Medium (Automatic)   Pulse: 92   Resp: 17   Temp: 98.5 °F (36.9 °C)   TempSrc: Oral   SpO2: 100%   Weight: 53.1 kg (117 lb 1 oz)   Height: 5' 5" (1.651 m)     BMI: Body mass index is 19.48 kg/m².    Physical Exam  Constitutional:       Appearance: Normal appearance.   HENT:      Head: Normocephalic and atraumatic.      Nose: Nose normal.      Mouth/Throat:      Mouth: Mucous membranes are moist.   Eyes:      Extraocular Movements: Extraocular movements intact.      Pupils: Pupils are equal, round, and reactive to light.   Neck:      Musculoskeletal: Normal range of motion and neck supple.   Cardiovascular:      Rate and Rhythm: Normal rate and regular rhythm.      Pulses: Normal pulses.   Pulmonary:      Effort: Pulmonary effort is normal.      Breath sounds: Normal breath sounds.   Abdominal:      General: Abdomen is flat.      Palpations: Abdomen is soft.      Comments: J-pouch in place   Musculoskeletal: Normal range of motion.         General: No swelling.   Skin:     General: Skin is warm and dry.      Capillary Refill: Capillary refill takes less than 2 seconds.   Neurological:      General: No focal deficit present.      Mental Status: She is alert and oriented to person, place, and time.   Psychiatric:         Mood and Affect: Mood normal.         Behavior: Behavior normal.         Laboratory Data:  No visits with results within 1 Week(s) from this visit.   Latest known visit with results is:   No results displayed because visit has over 200 results.            Imaging:    Assessment:       1. Portal vein thrombosis    2. Ulcerative " pancolitis with other complication      1. Portal vein thrombosis:  A portal vein thrombosis is likely provoked in view of recent history of surgery a week ago. MPN and PNH is negative   2. Ulcerative colitis status post total colectomy on June 11, 2020 and recent completion proctectomy on November 5, 2020  3. Fatigue and excessive spotting during periods while on eliquis       Plan:     1. Will get CBC, CMP and Iron studies today  2. Continue Eliquis for a total of 6 months.     Please schedule a follow up appointment in 3 months with CBC and CMP and MD appointment    Plan of care discussed with Dr. Daniel Mcghee MD PGY-6  Hematology and Oncology  Pager:[phone]

## 2020-12-02 ENCOUNTER — OFFICE VISIT (OUTPATIENT)
Dept: SURGERY | Facility: CLINIC | Age: 25
End: 2020-12-02
Attending: COLON & RECTAL SURGERY
Payer: MEDICAID

## 2020-12-02 ENCOUNTER — OFFICE VISIT (OUTPATIENT)
Dept: WOUND CARE | Facility: CLINIC | Age: 25
End: 2020-12-02
Payer: MEDICAID

## 2020-12-02 VITALS
DIASTOLIC BLOOD PRESSURE: 62 MMHG | SYSTOLIC BLOOD PRESSURE: 106 MMHG | HEART RATE: 82 BPM | WEIGHT: 115.31 LBS | HEIGHT: 65 IN | BODY MASS INDEX: 19.21 KG/M2

## 2020-12-02 DIAGNOSIS — Z43.2 ATTENTION TO ILEOSTOMY: Primary | ICD-10-CM

## 2020-12-02 DIAGNOSIS — K51.00 ULCERATIVE PANCOLITIS: Primary | ICD-10-CM

## 2020-12-02 DIAGNOSIS — Z98.890 POST-OPERATIVE STATE: ICD-10-CM

## 2020-12-02 PROCEDURE — 99999 PR PBB SHADOW E&M-EST. PATIENT-LVL IV: ICD-10-PCS | Mod: PBBFAC,,, | Performed by: COLON & RECTAL SURGERY

## 2020-12-02 PROCEDURE — 99999 PR PBB SHADOW E&M-EST. PATIENT-LVL II: ICD-10-PCS | Mod: PBBFAC,,, | Performed by: CLINICAL NURSE SPECIALIST

## 2020-12-02 PROCEDURE — 99212 OFFICE O/P EST SF 10 MIN: CPT | Mod: PBBFAC | Performed by: CLINICAL NURSE SPECIALIST

## 2020-12-02 PROCEDURE — 99999 PR PBB SHADOW E&M-EST. PATIENT-LVL II: CPT | Mod: PBBFAC,,, | Performed by: CLINICAL NURSE SPECIALIST

## 2020-12-02 PROCEDURE — 99024 POSTOP FOLLOW-UP VISIT: CPT | Mod: ,,, | Performed by: COLON & RECTAL SURGERY

## 2020-12-02 PROCEDURE — 99999 PR PBB SHADOW E&M-EST. PATIENT-LVL IV: CPT | Mod: PBBFAC,,, | Performed by: COLON & RECTAL SURGERY

## 2020-12-02 PROCEDURE — 99024 PR POST-OP FOLLOW-UP VISIT: ICD-10-PCS | Mod: ,,, | Performed by: CLINICAL NURSE SPECIALIST

## 2020-12-02 PROCEDURE — 99024 PR POST-OP FOLLOW-UP VISIT: ICD-10-PCS | Mod: ,,, | Performed by: COLON & RECTAL SURGERY

## 2020-12-02 PROCEDURE — 99024 POSTOP FOLLOW-UP VISIT: CPT | Mod: ,,, | Performed by: CLINICAL NURSE SPECIALIST

## 2020-12-02 PROCEDURE — 99214 OFFICE O/P EST MOD 30 MIN: CPT | Mod: PBBFAC,27 | Performed by: COLON & RECTAL SURGERY

## 2020-12-02 RX ORDER — GABAPENTIN 100 MG/1
300 CAPSULE ORAL 3 TIMES DAILY
Qty: 270 CAPSULE | Refills: 11 | Status: SHIPPED | OUTPATIENT
Start: 2020-12-02 | End: 2021-06-23

## 2020-12-02 RX ORDER — DIAZEPAM 2 MG/1
2 TABLET ORAL NIGHTLY PRN
Qty: 30 TABLET | Refills: 0 | Status: SHIPPED | OUTPATIENT
Start: 2020-12-02 | End: 2020-12-03 | Stop reason: SDUPTHER

## 2020-12-02 NOTE — PROGRESS NOTES
Pt now has a loop ileostomy and the os is looking down at 6 oclock, she has made adjustments to her regimen and is doing great, using new image  72608 CTF convex with paste /caullking   Reviewed issues of concern and also discussed support group options.

## 2020-12-03 ENCOUNTER — PATIENT MESSAGE (OUTPATIENT)
Dept: SURGERY | Facility: CLINIC | Age: 25
End: 2020-12-03

## 2020-12-03 DIAGNOSIS — Z98.890 POST-OPERATIVE STATE: Primary | ICD-10-CM

## 2020-12-03 RX ORDER — DIAZEPAM 2 MG/1
2 TABLET ORAL NIGHTLY PRN
Qty: 30 TABLET | Refills: 0 | Status: SHIPPED | OUTPATIENT
Start: 2020-12-03 | End: 2020-12-09

## 2020-12-04 ENCOUNTER — TELEPHONE (OUTPATIENT)
Dept: HEMATOLOGY/ONCOLOGY | Facility: CLINIC | Age: 25
End: 2020-12-04

## 2020-12-04 NOTE — TELEPHONE ENCOUNTER
Spoke to Ms. Mitchell and updated her blood work. Her ferritin is normal at 65 and normal serum iron at 36, however her TIBC is elevated at 519. Her Hb is stable at 10.5. I also believe her elevated ferritin and platelets at 489 is also contributed by inflammatory state and Iron def anemia. Recommend to take oral Iron pill along with the multivitamin pill. Answered all questions to her satisfaction

## 2020-12-08 ENCOUNTER — PATIENT MESSAGE (OUTPATIENT)
Dept: SURGERY | Facility: CLINIC | Age: 25
End: 2020-12-08

## 2020-12-09 DIAGNOSIS — Z98.890 POST-OPERATIVE STATE: Primary | ICD-10-CM

## 2020-12-09 RX ORDER — LORAZEPAM 1 MG/1
2 TABLET ORAL NIGHTLY PRN
Qty: 30 TABLET | Refills: 0 | Status: SHIPPED | OUTPATIENT
Start: 2020-12-09 | End: 2021-04-01

## 2020-12-14 ENCOUNTER — TELEPHONE (OUTPATIENT)
Dept: SURGERY | Facility: CLINIC | Age: 25
End: 2020-12-14

## 2020-12-15 ENCOUNTER — ANESTHESIA (OUTPATIENT)
Dept: SURGERY | Facility: HOSPITAL | Age: 25
End: 2020-12-15
Payer: MEDICAID

## 2020-12-15 ENCOUNTER — TELEPHONE (OUTPATIENT)
Dept: SURGERY | Facility: CLINIC | Age: 25
End: 2020-12-15

## 2020-12-15 ENCOUNTER — ANESTHESIA EVENT (OUTPATIENT)
Dept: SURGERY | Facility: HOSPITAL | Age: 25
End: 2020-12-15
Payer: MEDICAID

## 2020-12-15 ENCOUNTER — HOSPITAL ENCOUNTER (OUTPATIENT)
Facility: HOSPITAL | Age: 25
Discharge: HOME | End: 2020-12-15
Attending: COLON & RECTAL SURGERY | Admitting: COLON & RECTAL SURGERY
Payer: MEDICAID

## 2020-12-15 VITALS
RESPIRATION RATE: 6 BRPM | HEART RATE: 71 BPM | SYSTOLIC BLOOD PRESSURE: 102 MMHG | TEMPERATURE: 98 F | OXYGEN SATURATION: 100 % | WEIGHT: 115.31 LBS | BODY MASS INDEX: 19.21 KG/M2 | HEIGHT: 65 IN | DIASTOLIC BLOOD PRESSURE: 71 MMHG

## 2020-12-15 DIAGNOSIS — K51.00 ULCERATIVE PANCOLITIS: Primary | ICD-10-CM

## 2020-12-15 DIAGNOSIS — K51.00 ULCERATIVE PANCOLITIS: ICD-10-CM

## 2020-12-15 DIAGNOSIS — K62.89 ANAL PAIN: ICD-10-CM

## 2020-12-15 LAB — SARS-COV-2 RDRP RESP QL NAA+PROBE: NEGATIVE

## 2020-12-15 PROCEDURE — 71000015 HC POSTOP RECOV 1ST HR: Performed by: COLON & RECTAL SURGERY

## 2020-12-15 PROCEDURE — D9220A PRA ANESTHESIA: ICD-10-PCS | Mod: ,,, | Performed by: ANESTHESIOLOGY

## 2020-12-15 PROCEDURE — 37000008 HC ANESTHESIA 1ST 15 MINUTES: Performed by: COLON & RECTAL SURGERY

## 2020-12-15 PROCEDURE — 45330 PR SIGMOIDOSCOPY,DIAG2STIC: ICD-10-PCS | Mod: 58,,, | Performed by: COLON & RECTAL SURGERY

## 2020-12-15 PROCEDURE — 37000009 HC ANESTHESIA EA ADD 15 MINS: Performed by: COLON & RECTAL SURGERY

## 2020-12-15 PROCEDURE — 45330 DIAGNOSTIC SIGMOIDOSCOPY: CPT | Mod: 58,,, | Performed by: COLON & RECTAL SURGERY

## 2020-12-15 PROCEDURE — 25000003 PHARM REV CODE 250: Performed by: NURSE PRACTITIONER

## 2020-12-15 PROCEDURE — D9220A PRA ANESTHESIA: Mod: ,,, | Performed by: ANESTHESIOLOGY

## 2020-12-15 PROCEDURE — 00811 ANES LWR INTST NDSC NOS: CPT | Performed by: COLON & RECTAL SURGERY

## 2020-12-15 PROCEDURE — 36000705 HC OR TIME LEV I EA ADD 15 MIN: Performed by: COLON & RECTAL SURGERY

## 2020-12-15 PROCEDURE — 36000704 HC OR TIME LEV I 1ST 15 MIN: Performed by: COLON & RECTAL SURGERY

## 2020-12-15 PROCEDURE — 63600175 PHARM REV CODE 636 W HCPCS: Performed by: STUDENT IN AN ORGANIZED HEALTH CARE EDUCATION/TRAINING PROGRAM

## 2020-12-15 PROCEDURE — 71000044 HC DOSC ROUTINE RECOVERY FIRST HOUR: Performed by: COLON & RECTAL SURGERY

## 2020-12-15 PROCEDURE — U0002 COVID-19 LAB TEST NON-CDC: HCPCS

## 2020-12-15 RX ORDER — MUPIROCIN 20 MG/G
OINTMENT TOPICAL
Status: DISPENSED | OUTPATIENT
Start: 2020-12-15

## 2020-12-15 RX ORDER — SODIUM CHLORIDE 0.9 % (FLUSH) 0.9 %
2 SYRINGE (ML) INJECTION
Status: DISCONTINUED | OUTPATIENT
Start: 2020-12-15 | End: 2020-12-15 | Stop reason: HOSPADM

## 2020-12-15 RX ORDER — SODIUM CHLORIDE 9 MG/ML
INJECTION, SOLUTION INTRAVENOUS CONTINUOUS
Status: ACTIVE | OUTPATIENT
Start: 2020-12-15

## 2020-12-15 RX ORDER — FENTANYL CITRATE 50 UG/ML
25 INJECTION, SOLUTION INTRAMUSCULAR; INTRAVENOUS EVERY 5 MIN PRN
Status: DISCONTINUED | OUTPATIENT
Start: 2020-12-15 | End: 2020-12-15 | Stop reason: HOSPADM

## 2020-12-15 RX ORDER — ONDANSETRON 2 MG/ML
4 INJECTION INTRAMUSCULAR; INTRAVENOUS ONCE AS NEEDED
Status: DISCONTINUED | OUTPATIENT
Start: 2020-12-15 | End: 2020-12-15 | Stop reason: HOSPADM

## 2020-12-15 RX ORDER — MIDAZOLAM HYDROCHLORIDE 5 MG/ML
INJECTION INTRAMUSCULAR; INTRAVENOUS
Status: DISCONTINUED | OUTPATIENT
Start: 2020-12-15 | End: 2020-12-15

## 2020-12-15 RX ORDER — PROPOFOL 10 MG/ML
VIAL (ML) INTRAVENOUS CONTINUOUS PRN
Status: DISCONTINUED | OUTPATIENT
Start: 2020-12-15 | End: 2020-12-15

## 2020-12-15 RX ADMIN — PROPOFOL 200 MCG/KG/MIN: 10 INJECTION, EMULSION INTRAVENOUS at 12:12

## 2020-12-15 RX ADMIN — SODIUM CHLORIDE 70 ML/HR: 0.9 INJECTION, SOLUTION INTRAVENOUS at 09:12

## 2020-12-15 RX ADMIN — MUPIROCIN: 20 OINTMENT TOPICAL at 10:12

## 2020-12-15 RX ADMIN — MIDAZOLAM 2 MG: 5 INJECTION INTRAMUSCULAR; INTRAVENOUS at 12:12

## 2020-12-15 NOTE — OP NOTE
[facility]  Operative Note    Date: 12/15/2020    Name: Alissa Mitchell    MRN: [id number]    Pre-Op Diagnosis: Ulcerative pancolitis s/p proctocolectomy with IPAA, anal pain    Post-Op Diagnosis: Same    Procedure(s) Performed:   Exam under anesthesia  SIGMOIDOSCOPY, FLEXIBLE    Specimen(s): None    Staff Surgeon: Delia Mehta    Assistant Surgeon: Adams Mace (fellow)    Anesthesia: Monitor Anesthesia Care    Details of procedure:  Patient was taken to the operating room and transferred to the OR table.  SCD boots were applied and she was placed under sedation by the anesthesia team.  She was then placed into lithotomy position.  After an appropriate time-out, a digital rectal exam was performed.  This demonstrated an intact circular staple line just proximal to the anorectal ring without any palpable evidence of abscess.  We then inserted an adult CO2 colonoscope into the pouch.  The pouch appeared healthy with a patent afferent limb, intact staple lines, and an intact EEA staple line just proximal to the dentate line without any evidence of separation or disruption.  The mucosa on both sides of the staple line appeared healthy.  At this time we elected not to risk traumatizing the anastomosis by inserting a larger Hill-Silvestre anoscope, as we had an adequate view with the colonoscope.  The colonoscope was then withdrawn.  The patient was awakened and transferred to the recovery room in good condition.  Sponge instrument counts were correct.  I was present for the entire procedure.      EEA staple line      EEA staple line      EEA staple line      Proximal pouch mucosa (healthy)        Estimated Blood Loss: 2mL    Drains/Implants: None    Wound Class: IV    Delia Mehta

## 2020-12-15 NOTE — TELEPHONE ENCOUNTER
----- Message from Delia Mehta MD sent at 12/15/2020  1:33 PM CST -----  Regarding: Clinic appt and water soluble enema  Can we please schedule her for a water soluble enema (order is in Epic) and appt with me same day for consents?  Please schedule in early/mid January.    Thanks!

## 2020-12-15 NOTE — TELEPHONE ENCOUNTER
Spoke with patient in regards to appointment for [facility] and appointment with Dr. Mehta on 1/15.  Appointment slip mailed.

## 2020-12-15 NOTE — ANESTHESIA PREPROCEDURE EVALUATION
[facility]  Anesthesia Pre-Operative Evaluation         Patient Name: Alissa Mitchell  YOB: 1995  MRN: [id number]    SUBJECTIVE:     Pre-operative evaluation for Procedure(s) (LRB):  Exam under anesthesia (N/A)  SIGMOIDOSCOPY, FLEXIBLE (N/A)     12/15/2020    26yo F s/p laparoscopic completion proctectomy w/ IPAA for ulcerative colitis on 11/5/2020, overall doing well but having some anal discomfort.  This may be from the proximity of her staple line to the dentate line.  She would like to do an exam under anesthesia and flexible sigmoidoscopy for full evaluation of this before considering ileostomy reversal next month.    Patient now presents for the above procedure(s).      LDA: None documented.       Ileostomy 06/11/20 Standard (Baylee, shane) RLQ (Active)   Stoma Appearance rosebud appearance 11/17/20 0900   Appliance 1-piece 11/16/20 1930   Stoma Function flatus;stool 11/17/20 0900   Output (mL) 100 mL 11/13/20 0218   Number of days: 187            Rectal Tube 11/05/20 1756 other (see comments) (Active)   Number of days: 39        Prev airway: Intubation:     Induction:  Intravenous    Intubated:  Postinduction    Mask Ventilation:  Easy mask    Attempts:  1    Attempted By:  CRNA    Method of Intubation:  Direct    Blade:  Mathews 2    Laryngeal View Grade: Grade I - full view of chords      Difficult Airway Encountered?: No      Complications:  None    Airway Device:  Oral endotracheal tube    Airway Device Size:  7.0    Style/Cuff Inflation:  Cuffed (inflated to minimal occlusive pressure)    Tube secured:  21    Secured at:  The lips    Placement Verified By:  Capnometry    Complicating Factors:  None    Findings Post-Intubation:  BS equal bilateral    Drips: None documented.       Patient Active Problem List   Diagnosis    Ulcerative colitis    Ulcerative pancolitis    Ileus    Portal vein thrombosis       Review of patient's allergies indicates:  No Known  Allergies    Current Outpatient Medications:  No current facility-administered medications for this encounter.     Current Outpatient Medications:     apixaban (ELIQUIS) 5 mg Tab, Take 1 tablet (5 mg total) by mouth 2 (two) times daily., Disp: 60 tablet, Rfl: 3    gabapentin (NEURONTIN) 100 MG capsule, Take 3 capsules (300 mg total) by mouth 3 (three) times daily., Disp: 270 capsule, Rfl: 11    acetaminophen (TYLENOL) 500 MG tablet, Take 2 tablets (1,000 mg total) by mouth every 8 (eight) hours., Disp: 30 tablet, Rfl: 0    LORazepam (ATIVAN) 1 MG tablet, Take 2 tablets (2 mg total) by mouth nightly as needed for Anxiety., Disp: 30 tablet, Rfl: 0    ondansetron (ZOFRAN-ODT) 4 MG TbDL, Take 1 tablet (4 mg total) by mouth every 6 (six) hours as needed., Disp: 20 tablet, Rfl: 0    Past Surgical History:   Procedure Laterality Date    LAPAROSCOPIC PROCTOCOLECTOMY WITH ILEOANAL ANASTOMOSIS, CREATION OF ILEAL POUCH, AND ILEOSTOMY N/A 11/5/2020    Procedure: PROCTECTOMY, LAPAROSCOPIC, WITH ILEOANAL ANASTOMOSIS, ILEAL POUCH CREATION, AND ILEOSTOMY CREATION, ERAS low;  Surgeon: Delia Mehta MD;  Location: [facility] OR [address] [place] [address];  Service: Colon and Rectal;  Laterality: N/A;    LAPAROSCOPIC TOTAL COLECTOMY N/A 6/11/2020    Procedure: COLECTOMY, TOTAL, LAPAROSCOPIC, ERAS high and total abdominal colectomy;  Surgeon: Delia Mehta MD;  Location: [facility] OR [address] [place] [address];  Service: Colon and Rectal;  Laterality: N/A;       Social History     Socioeconomic History    Marital status: Single     Spouse name: Not on file    Number of children: Not on file    Years of education: Not on file    Highest education level: Not on file   Occupational History    Not on file   Social Needs    Financial resource strain: Not on file    Food insecurity     Worry: Not on file     Inability: Not on file    Transportation needs     Medical: Not on file     Non-medical: Not on file   Tobacco Use    Smoking status: Never Smoker     Smokeless tobacco: Never Used   Substance and Sexual Activity    Alcohol use: Not on file    Drug use: Not on file    Sexual activity: Not on file   Lifestyle    Physical activity     Days per week: Not on file     Minutes per session: Not on file    Stress: Not on file   Relationships    Social connections     Talks on phone: Not on file     Gets together: Not on file     Attends Faith service: Not on file     Active member of club or organization: Not on file     Attends meetings of clubs or organizations: Not on file     Relationship status: Not on file   Other Topics Concern    Not on file   Social History Narrative    Not on file       OBJECTIVE:     Vital Signs Range (Last 24H):         Significant Labs:  Lab Results   Component Value Date    WBC 9.84 12/01/2020    HGB 10.5 (L) 12/01/2020    HCT 35.4 (L) 12/01/2020     (H) 12/01/2020    ALT 18 12/01/2020    AST 16 12/01/2020     12/01/2020    K 4.1 12/01/2020     12/01/2020    CREATININE 0.8 12/01/2020    BUN 17 12/01/2020    CO2 23 12/01/2020    INR 1.0 11/11/2020       Diagnostic Studies: No relevant studies.    EKG:   No results found for this or any previous visit.    2D ECHO:  TTE:  No results found for this or any previous visit.    MICK:  No results found for this or any previous visit.    ASSESSMENT/PLAN:       Anesthesia Evaluation    I have reviewed the Patient Summary Reports.    I have reviewed the Nursing Notes. I have reviewed the NPO Status.   I have reviewed the Medications.     Review of Systems  Anesthesia Hx:  No problems with previous Anesthesia   Denies Personal Hx of Anesthesia complications.   Social:  Non-Smoker  Denies Tobacco Use.   Cardiovascular:   Exercise tolerance: good Denies Hypertension.  Denies MI.  Denies CAD.    Denies CABG/stent.  Denies Dysrhythmias.  no hyperlipidemia  Denies Coronary Artery Disease.   Denies Hypertension.    Pulmonary:   Denies COPD.  Denies Asthma.  Denies Shortness of  breath.  Denies Recent URI.  Denies Asthma.  Denies Chronic Obstructive Pulmonary Disease (COPD).    Renal/:   Denies Chronic Renal Disease.   Denies Kidney Function/Disease    Hepatic/GI:   Denies PUD. Denies GERD. Liver Disease, H/o portal vein thrombosis, UC Denies Esophageal / Stomach Disorders  Bowel Conditions:  Inflammatory Bowel Disease, Ulcerative Colitis  Liver Disease (h/o portal vein thrombosis)    Neurological:   Denies TIA. Denies CVA. Denies Seizures.  Denies Seizure Disorder  Denies CVA - Cerebrovasular Accident  Denies TIA - Transient Ischemic Attack    Endocrine:   Denies Diabetes. Denies Hypothyroidism.  Denies Diabetes  Denies Thyroid Disease  Denies Metabolic Disorders  Psych:   Psychiatric History  Attention Deficit Disorder.         Physical Exam  General:  Well nourished    Airway/Jaw/Neck:  Airway Findings: Mouth Opening: Normal Tongue: Normal  General Airway Assessment: Adult  Mallampati: III  TM Distance: 4 - 6 cm  Jaw/Neck Findings:  Neck ROM: Normal ROM       Chest/Lungs:  Chest/Lungs Findings: Clear to auscultation, Normal Respiratory Rate     Heart/Vascular:  Heart Findings: Rate: Normal  Rhythm: Regular Rhythm  Sounds: Normal  Heart murmur: negative       Mental Status:  Mental Status Findings:  Cooperative, Alert and Oriented         Anesthesia Plan  Type of Anesthesia, risks & benefits discussed:  Anesthesia Type:  MAC  Patient's Preference:   Intra-op Monitoring Plan: standard ASA monitors  Intra-op Monitoring Plan Comments:   Post Op Pain Control Plan: per primary service following discharge from PACU, IV/PO Opioids PRN and multimodal analgesia  Post Op Pain Control Plan Comments:   Induction:   IV  Beta Blocker:  Patient is not currently on a Beta-Blocker (No further documentation required).       Informed Consent: Patient understands risks and agrees with Anesthesia plan.  Questions answered. Anesthesia consent signed with patient.  ASA Score: 2     Day of Surgery Review of  History & Physical:    H&P update referred to the surgeon.         Ready For Surgery From Anesthesia Perspective.

## 2020-12-15 NOTE — ANESTHESIA POSTPROCEDURE EVALUATION
Anesthesia Post Evaluation    Patient: Alissa Mitchell    Procedure(s) Performed: Procedure(s) (LRB):  Exam under anesthesia (N/A)  SIGMOIDOSCOPY, FLEXIBLE (N/A)    Final Anesthesia Type: general    Patient location during evaluation: PACU  Patient participation: Yes- Able to Participate  Level of consciousness: awake  Post-procedure vital signs: reviewed and stable  Pain management: adequate  Airway patency: patent    PONV status at discharge: No PONV  Anesthetic complications: no      Cardiovascular status: blood pressure returned to baseline  Respiratory status: unassisted  Hydration status: euvolemic  Follow-up not needed.          Vitals Value Taken Time   /51 12/15/20 1401   Temp 36.5 °C (97.7 °F) 12/15/20 1321   Pulse 63 12/15/20 1404   Resp 6 12/15/20 1321   SpO2 100 % 12/15/20 1404   Vitals shown include unvalidated device data.      No case tracking events are documented in the log.      Pain/Christy Score: No data recorded

## 2020-12-15 NOTE — BRIEF OP NOTE
[facility]  Brief Operative Note    Surgery Date: 12/15/2020     Surgeon(s) and Role:     * Delia Mehta MD - Primary     * Adams Mace MD - Fellow    Assisting Surgeon: None    Pre-op Diagnosis:  Ulcerative pancolitis [K51.00]    Post-op Diagnosis:  Post-Op Diagnosis Codes:     * Ulcerative pancolitis [K51.00]    Procedure(s) (LRB):  Exam under anesthesia (N/A)  SIGMOIDOSCOPY, FLEXIBLE (N/A)    Anesthesia: Monitor Anesthesia Care    Description of the findings of the procedure(s):                         Estimated Blood Loss: None         Specimens:   Specimen (12h ago, onward)    None            Discharge Note    OUTCOME: Patient tolerated treatment/procedure well without complication and is now ready for discharge.    DISPOSITION: Home or Self Care    FINAL DIAGNOSIS:  Ulcerative colitis, pelvic pain    FOLLOWUP: In clinic    DISCHARGE INSTRUCTIONS:  No discharge procedures on file.

## 2020-12-15 NOTE — INTERVAL H&P NOTE
The patient has been examined and the H&P has been reviewed:    I concur with the findings and no changes have occurred since H&P was written.    Surgery risks, benefits and alternative options discussed and understood by patient/family.          Active Hospital Problems    Diagnosis  POA    Anal pain [K62.89]  Yes      Resolved Hospital Problems   No resolved problems to display.

## 2020-12-16 ENCOUNTER — PATIENT MESSAGE (OUTPATIENT)
Dept: HEMATOLOGY/ONCOLOGY | Facility: CLINIC | Age: 25
End: 2020-12-16

## 2020-12-16 DIAGNOSIS — I81 PORTAL VEIN THROMBOSIS: Primary | ICD-10-CM

## 2020-12-29 ENCOUNTER — PATIENT MESSAGE (OUTPATIENT)
Dept: SURGERY | Facility: CLINIC | Age: 25
End: 2020-12-29

## 2021-01-15 ENCOUNTER — HOSPITAL ENCOUNTER (OUTPATIENT)
Dept: RADIOLOGY | Facility: HOSPITAL | Age: 26
Discharge: HOME | End: 2021-01-15
Attending: COLON & RECTAL SURGERY
Payer: MEDICAID

## 2021-01-15 ENCOUNTER — OFFICE VISIT (OUTPATIENT)
Dept: SURGERY | Facility: CLINIC | Age: 26
End: 2021-01-15
Attending: COLON & RECTAL SURGERY
Payer: MEDICAID

## 2021-01-15 VITALS
BODY MASS INDEX: 19.28 KG/M2 | HEIGHT: 65 IN | SYSTOLIC BLOOD PRESSURE: 89 MMHG | WEIGHT: 115.75 LBS | HEART RATE: 84 BPM | DIASTOLIC BLOOD PRESSURE: 69 MMHG

## 2021-01-15 DIAGNOSIS — K51.00 ULCERATIVE PANCOLITIS: Primary | ICD-10-CM

## 2021-01-15 DIAGNOSIS — K51.00 ULCERATIVE PANCOLITIS: ICD-10-CM

## 2021-01-15 PROCEDURE — 99999 PR PBB SHADOW E&M-EST. PATIENT-LVL IV: CPT | Mod: PBBFAC,,, | Performed by: COLON & RECTAL SURGERY

## 2021-01-15 PROCEDURE — 25500020 PHARM REV CODE 255: Performed by: COLON & RECTAL SURGERY

## 2021-01-15 PROCEDURE — 99024 PR POST-OP FOLLOW-UP VISIT: ICD-10-PCS | Mod: S$PBB,,, | Performed by: COLON & RECTAL SURGERY

## 2021-01-15 PROCEDURE — 74270 X-RAY XM COLON 1CNTRST STD: CPT | Mod: 26,,, | Performed by: RADIOLOGY

## 2021-01-15 PROCEDURE — 99214 OFFICE O/P EST MOD 30 MIN: CPT | Mod: PBBFAC,25 | Performed by: COLON & RECTAL SURGERY

## 2021-01-15 PROCEDURE — 74270 FL GASTROGRAFIN ENEMA WATER SOLUBLE: ICD-10-PCS | Mod: 26,,, | Performed by: RADIOLOGY

## 2021-01-15 PROCEDURE — 99999 PR PBB SHADOW E&M-EST. PATIENT-LVL IV: ICD-10-PCS | Mod: PBBFAC,,, | Performed by: COLON & RECTAL SURGERY

## 2021-01-15 PROCEDURE — 74270 X-RAY XM COLON 1CNTRST STD: CPT | Mod: TC

## 2021-01-15 PROCEDURE — 99024 POSTOP FOLLOW-UP VISIT: CPT | Mod: S$PBB,,, | Performed by: COLON & RECTAL SURGERY

## 2021-01-15 RX ADMIN — DIATRIZOATE MEGLUMINE AND DIATRIZOATE SODIUM 240 ML: 660; 100 LIQUID ORAL; RECTAL at 11:01

## 2021-01-21 ENCOUNTER — TELEPHONE (OUTPATIENT)
Dept: SURGERY | Facility: CLINIC | Age: 26
End: 2021-01-21

## 2021-01-21 ENCOUNTER — PATIENT MESSAGE (OUTPATIENT)
Dept: SURGERY | Facility: CLINIC | Age: 26
End: 2021-01-21

## 2021-01-21 DIAGNOSIS — K52.9 INFLAMMATORY BOWEL DISEASE: Primary | ICD-10-CM

## 2021-01-21 RX ORDER — ENOXAPARIN SODIUM 100 MG/ML
40 INJECTION SUBCUTANEOUS EVERY 12 HOURS
Qty: 4 SYRINGE | Refills: 2 | Status: SHIPPED | OUTPATIENT
Start: 2021-01-21 | End: 2021-02-05

## 2021-01-22 ENCOUNTER — HOSPITAL ENCOUNTER (OUTPATIENT)
Dept: RADIOLOGY | Facility: HOSPITAL | Age: 26
Discharge: HOME | End: 2021-01-22
Attending: COLON & RECTAL SURGERY
Payer: MEDICAID

## 2021-01-22 DIAGNOSIS — K52.9 INFLAMMATORY BOWEL DISEASE: ICD-10-CM

## 2021-01-22 PROCEDURE — 25500020 PHARM REV CODE 255: Performed by: COLON & RECTAL SURGERY

## 2021-01-22 PROCEDURE — 74177 CT ABDOMEN PELVIS WITH CONTRAST: ICD-10-PCS | Mod: 26,,, | Performed by: RADIOLOGY

## 2021-01-22 PROCEDURE — 74177 CT ABD & PELVIS W/CONTRAST: CPT | Mod: 26,,, | Performed by: RADIOLOGY

## 2021-01-22 PROCEDURE — 74177 CT ABD & PELVIS W/CONTRAST: CPT | Mod: TC

## 2021-01-22 RX ADMIN — IOHEXOL 75 ML: 350 INJECTION, SOLUTION INTRAVENOUS at 01:01

## 2021-01-26 ENCOUNTER — PATIENT MESSAGE (OUTPATIENT)
Dept: SURGERY | Facility: HOSPITAL | Age: 26
End: 2021-01-26

## 2021-01-27 ENCOUNTER — TELEPHONE (OUTPATIENT)
Dept: INTERVENTIONAL RADIOLOGY/VASCULAR | Facility: HOSPITAL | Age: 26
End: 2021-01-27

## 2021-01-27 DIAGNOSIS — K65.1: Primary | ICD-10-CM

## 2021-01-28 ENCOUNTER — PATIENT MESSAGE (OUTPATIENT)
Dept: SURGERY | Facility: HOSPITAL | Age: 26
End: 2021-01-28

## 2021-01-29 ENCOUNTER — HOSPITAL ENCOUNTER (OUTPATIENT)
Facility: OTHER | Age: 26
Discharge: HOME | End: 2021-01-29
Attending: RADIOLOGY | Admitting: RADIOLOGY
Payer: MEDICAID

## 2021-01-29 VITALS
RESPIRATION RATE: 17 BRPM | OXYGEN SATURATION: 100 % | SYSTOLIC BLOOD PRESSURE: 105 MMHG | DIASTOLIC BLOOD PRESSURE: 53 MMHG | HEART RATE: 86 BPM

## 2021-01-29 DIAGNOSIS — K65.1: ICD-10-CM

## 2021-01-29 DIAGNOSIS — Z01.818 PREOP TESTING: ICD-10-CM

## 2021-01-29 DIAGNOSIS — K65.1 PERITONEAL ABSCESS: ICD-10-CM

## 2021-01-29 DIAGNOSIS — K52.9 INFLAMMATORY BOWEL DISEASE: Primary | ICD-10-CM

## 2021-01-29 PROCEDURE — 25000003 PHARM REV CODE 250: Performed by: RADIOLOGY

## 2021-01-29 PROCEDURE — 99153 MOD SED SAME PHYS/QHP EA: CPT | Performed by: RADIOLOGY

## 2021-01-29 PROCEDURE — 63600175 PHARM REV CODE 636 W HCPCS: Performed by: RADIOLOGY

## 2021-01-29 PROCEDURE — 87070 CULTURE OTHR SPECIMN AEROBIC: CPT

## 2021-01-29 PROCEDURE — 99152 MOD SED SAME PHYS/QHP 5/>YRS: CPT | Performed by: RADIOLOGY

## 2021-01-29 PROCEDURE — 87205 SMEAR GRAM STAIN: CPT

## 2021-01-29 RX ORDER — OXYCODONE HYDROCHLORIDE 5 MG/1
5 TABLET ORAL EVERY 4 HOURS PRN
Qty: 15 TABLET | Refills: 0 | Status: ON HOLD | OUTPATIENT
Start: 2021-01-29 | End: 2021-03-04 | Stop reason: HOSPADM

## 2021-01-29 RX ORDER — HYDROCODONE BITARTRATE AND ACETAMINOPHEN 5; 325 MG/1; MG/1
1 TABLET ORAL EVERY 4 HOURS PRN
Status: DISCONTINUED | OUTPATIENT
Start: 2021-01-29 | End: 2021-01-29 | Stop reason: HOSPADM

## 2021-01-29 RX ORDER — FENTANYL CITRATE 50 UG/ML
INJECTION, SOLUTION INTRAMUSCULAR; INTRAVENOUS
Status: DISCONTINUED | OUTPATIENT
Start: 2021-01-29 | End: 2021-01-29 | Stop reason: HOSPADM

## 2021-01-29 RX ORDER — MIDAZOLAM HYDROCHLORIDE 1 MG/ML
INJECTION INTRAMUSCULAR; INTRAVENOUS
Status: DISCONTINUED | OUTPATIENT
Start: 2021-01-29 | End: 2021-01-29 | Stop reason: HOSPADM

## 2021-01-29 RX ADMIN — HYDROCODONE BITARTRATE AND ACETAMINOPHEN 1 TABLET: 5; 325 TABLET ORAL at 03:01

## 2021-02-02 ENCOUNTER — PATIENT MESSAGE (OUTPATIENT)
Dept: SURGERY | Facility: HOSPITAL | Age: 26
End: 2021-02-02

## 2021-02-02 LAB
BACTERIA FLD AEROBE CULT: NO GROWTH
GRAM STN SPEC: NORMAL
GRAM STN SPEC: NORMAL

## 2021-02-04 ENCOUNTER — PATIENT MESSAGE (OUTPATIENT)
Dept: SURGERY | Facility: HOSPITAL | Age: 26
End: 2021-02-04

## 2021-02-05 ENCOUNTER — OFFICE VISIT (OUTPATIENT)
Dept: SURGERY | Facility: CLINIC | Age: 26
End: 2021-02-05
Attending: COLON & RECTAL SURGERY
Payer: MEDICAID

## 2021-02-05 VITALS
HEIGHT: 65 IN | SYSTOLIC BLOOD PRESSURE: 100 MMHG | DIASTOLIC BLOOD PRESSURE: 64 MMHG | BODY MASS INDEX: 19.07 KG/M2 | HEART RATE: 72 BPM | WEIGHT: 114.44 LBS

## 2021-02-05 DIAGNOSIS — K52.9 INFLAMMATORY BOWEL DISEASE: Primary | ICD-10-CM

## 2021-02-05 PROCEDURE — 99213 OFFICE O/P EST LOW 20 MIN: CPT | Mod: S$PBB,,, | Performed by: COLON & RECTAL SURGERY

## 2021-02-05 PROCEDURE — 99999 PR PBB SHADOW E&M-EST. PATIENT-LVL III: ICD-10-PCS | Mod: PBBFAC,,, | Performed by: COLON & RECTAL SURGERY

## 2021-02-05 PROCEDURE — 99213 OFFICE O/P EST LOW 20 MIN: CPT | Mod: PBBFAC | Performed by: COLON & RECTAL SURGERY

## 2021-02-05 PROCEDURE — 99999 PR PBB SHADOW E&M-EST. PATIENT-LVL III: CPT | Mod: PBBFAC,,, | Performed by: COLON & RECTAL SURGERY

## 2021-02-05 PROCEDURE — 99213 PR OFFICE/OUTPT VISIT, EST, LEVL III, 20-29 MIN: ICD-10-PCS | Mod: S$PBB,,, | Performed by: COLON & RECTAL SURGERY

## 2021-02-05 RX ORDER — AMOXICILLIN AND CLAVULANATE POTASSIUM 875; 125 MG/1; MG/1
1 TABLET, FILM COATED ORAL EVERY 12 HOURS
Qty: 10 TABLET | Refills: 0 | Status: SHIPPED | OUTPATIENT
Start: 2021-02-05 | End: 2021-02-10

## 2021-02-05 RX ORDER — ENOXAPARIN SODIUM 100 MG/ML
40 INJECTION SUBCUTANEOUS
Qty: 1.2 ML | Refills: 1 | Status: ON HOLD | OUTPATIENT
Start: 2021-02-05 | End: 2021-03-04 | Stop reason: HOSPADM

## 2021-02-05 RX ORDER — ENOXAPARIN SODIUM 100 MG/ML
40 INJECTION SUBCUTANEOUS
Qty: 3 SYRINGE | Refills: 1 | Status: SHIPPED | OUTPATIENT
Start: 2021-02-05 | End: 2021-02-05

## 2021-02-05 NOTE — PT/OT/SLP DISCHARGE
Occupational Therapy Discharge Summary    Alissa Mitchell  MRN: [id number]   Principal Problem: Ulcerative colitis      Patient Discharged from acute Occupational Therapy on 02-05-21.  Please refer to prior OT note dated 11-06-21 for functional status.    Assessment:      Patient appropriate for care in another setting.    Objective:     GOALS:   Multidisciplinary Problems     Occupational Therapy Goals        Problem: Occupational Therapy Goal    Goal Priority Disciplines Outcome Interventions   Occupational Therapy Goal     OT, PT/OT     Description: Goals to be met by: 11-13-20     Patient will increase functional independence with ADLs by performing:    LE Dressing with Set-up Assistance.  Grooming while standing at sink with Supervision.  Toileting from toilet with Supervision for hygiene and clothing management.   Supine to sit with Modified Cook.  Stand pivot transfers with Supervision.  Toilet transfer to toilet with Supervision.                     Reasons for Discontinuation of Therapy Services  Transfer to alternate level of care.      Plan:     Patient Discharged to: Home no OT services needed    RU Espinoza  2/5/2021

## 2021-02-16 ENCOUNTER — PATIENT MESSAGE (OUTPATIENT)
Dept: SURGERY | Facility: HOSPITAL | Age: 26
End: 2021-02-16

## 2021-02-17 ENCOUNTER — TELEPHONE (OUTPATIENT)
Dept: SURGERY | Facility: CLINIC | Age: 26
End: 2021-02-17

## 2021-02-18 ENCOUNTER — ANESTHESIA EVENT (OUTPATIENT)
Dept: SURGERY | Facility: HOSPITAL | Age: 26
DRG: 330 | End: 2021-02-18
Payer: MEDICAID

## 2021-02-18 ENCOUNTER — ANESTHESIA (OUTPATIENT)
Dept: SURGERY | Facility: HOSPITAL | Age: 26
DRG: 330 | End: 2021-02-18
Payer: MEDICAID

## 2021-02-18 ENCOUNTER — HOSPITAL ENCOUNTER (INPATIENT)
Facility: HOSPITAL | Age: 26
LOS: 3 days | Discharge: HOME | DRG: 330 | End: 2021-02-21
Attending: COLON & RECTAL SURGERY | Admitting: COLON & RECTAL SURGERY
Payer: MEDICAID

## 2021-02-18 DIAGNOSIS — Z01.818 PRE-OP TESTING: ICD-10-CM

## 2021-02-18 DIAGNOSIS — Z93.2 ILEOSTOMY IN PLACE: Primary | ICD-10-CM

## 2021-02-18 DIAGNOSIS — I81 PORTAL VEIN THROMBOSIS: ICD-10-CM

## 2021-02-18 PROBLEM — Z98.890 S/P CLOSURE OF ILEOSTOMY: Status: ACTIVE | Noted: 2021-02-18

## 2021-02-18 LAB
ABO + RH BLD: NORMAL
B-HCG UR QL: NEGATIVE
BLD GP AB SCN CELLS X3 SERPL QL: NORMAL
CTP QC/QA: YES
SARS-COV-2 RDRP RESP QL NAA+PROBE: NEGATIVE

## 2021-02-18 PROCEDURE — 63600175 PHARM REV CODE 636 W HCPCS: Performed by: NURSE ANESTHETIST, CERTIFIED REGISTERED

## 2021-02-18 PROCEDURE — S0030 INJECTION, METRONIDAZOLE: HCPCS | Performed by: NURSE PRACTITIONER

## 2021-02-18 PROCEDURE — 27100025 HC TUBING, SET FLUID WARMER: Performed by: ANESTHESIOLOGY

## 2021-02-18 PROCEDURE — 63600175 PHARM REV CODE 636 W HCPCS: Performed by: STUDENT IN AN ORGANIZED HEALTH CARE EDUCATION/TRAINING PROGRAM

## 2021-02-18 PROCEDURE — 94761 N-INVAS EAR/PLS OXIMETRY MLT: CPT

## 2021-02-18 PROCEDURE — 25000003 PHARM REV CODE 250: Performed by: NURSE PRACTITIONER

## 2021-02-18 PROCEDURE — U0002 COVID-19 LAB TEST NON-CDC: HCPCS

## 2021-02-18 PROCEDURE — 36000708 HC OR TIME LEV III 1ST 15 MIN: Performed by: COLON & RECTAL SURGERY

## 2021-02-18 PROCEDURE — 37000009 HC ANESTHESIA EA ADD 15 MINS: Performed by: COLON & RECTAL SURGERY

## 2021-02-18 PROCEDURE — D9220A PRA ANESTHESIA: Mod: ANES,,, | Performed by: ANESTHESIOLOGY

## 2021-02-18 PROCEDURE — D9220A PRA ANESTHESIA: Mod: CRNA,,, | Performed by: NURSE ANESTHETIST, CERTIFIED REGISTERED

## 2021-02-18 PROCEDURE — 25000003 PHARM REV CODE 250: Performed by: COLON & RECTAL SURGERY

## 2021-02-18 PROCEDURE — 71000016 HC POSTOP RECOV ADDL HR: Performed by: COLON & RECTAL SURGERY

## 2021-02-18 PROCEDURE — 88307 TISSUE EXAM BY PATHOLOGIST: CPT | Mod: 26,,, | Performed by: PATHOLOGY

## 2021-02-18 PROCEDURE — 88307 PR  SURG PATH,LEVEL V: ICD-10-PCS | Mod: 26,,, | Performed by: PATHOLOGY

## 2021-02-18 PROCEDURE — 25000003 PHARM REV CODE 250: Performed by: STUDENT IN AN ORGANIZED HEALTH CARE EDUCATION/TRAINING PROGRAM

## 2021-02-18 PROCEDURE — 37000008 HC ANESTHESIA 1ST 15 MINUTES: Performed by: COLON & RECTAL SURGERY

## 2021-02-18 PROCEDURE — 20600001 HC STEP DOWN PRIVATE ROOM

## 2021-02-18 PROCEDURE — 36000709 HC OR TIME LEV III EA ADD 15 MIN: Performed by: COLON & RECTAL SURGERY

## 2021-02-18 PROCEDURE — 88307 TISSUE EXAM BY PATHOLOGIST: CPT | Performed by: PATHOLOGY

## 2021-02-18 PROCEDURE — 44620 PR CLOSE ENTEROSTOMY: ICD-10-PCS | Mod: ,,, | Performed by: COLON & RECTAL SURGERY

## 2021-02-18 PROCEDURE — 71000015 HC POSTOP RECOV 1ST HR: Performed by: COLON & RECTAL SURGERY

## 2021-02-18 PROCEDURE — 25000003 PHARM REV CODE 250: Performed by: ANESTHESIOLOGY

## 2021-02-18 PROCEDURE — 71000033 HC RECOVERY, INTIAL HOUR: Performed by: COLON & RECTAL SURGERY

## 2021-02-18 PROCEDURE — 63600175 PHARM REV CODE 636 W HCPCS: Performed by: ANESTHESIOLOGY

## 2021-02-18 PROCEDURE — 25000003 PHARM REV CODE 250: Performed by: NURSE ANESTHETIST, CERTIFIED REGISTERED

## 2021-02-18 PROCEDURE — 27201423 OPTIME MED/SURG SUP & DEVICES STERILE SUPPLY: Performed by: COLON & RECTAL SURGERY

## 2021-02-18 PROCEDURE — D9220A PRA ANESTHESIA: ICD-10-PCS | Mod: CRNA,,, | Performed by: NURSE ANESTHETIST, CERTIFIED REGISTERED

## 2021-02-18 PROCEDURE — D9220A PRA ANESTHESIA: ICD-10-PCS | Mod: ANES,,, | Performed by: ANESTHESIOLOGY

## 2021-02-18 PROCEDURE — 86900 BLOOD TYPING SEROLOGIC ABO: CPT

## 2021-02-18 PROCEDURE — 63600175 PHARM REV CODE 636 W HCPCS: Performed by: NURSE PRACTITIONER

## 2021-02-18 PROCEDURE — 44620 REPAIR BOWEL OPENING: CPT | Mod: ,,, | Performed by: COLON & RECTAL SURGERY

## 2021-02-18 RX ORDER — DEXAMETHASONE SODIUM PHOSPHATE 4 MG/ML
INJECTION, SOLUTION INTRA-ARTICULAR; INTRALESIONAL; INTRAMUSCULAR; INTRAVENOUS; SOFT TISSUE
Status: DISCONTINUED | OUTPATIENT
Start: 2021-02-18 | End: 2021-02-18

## 2021-02-18 RX ORDER — GABAPENTIN 300 MG/1
300 CAPSULE ORAL 3 TIMES DAILY
Status: DISCONTINUED | OUTPATIENT
Start: 2021-02-18 | End: 2021-02-21 | Stop reason: HOSPADM

## 2021-02-18 RX ORDER — HEPARIN SODIUM 5000 [USP'U]/ML
5000 INJECTION, SOLUTION INTRAVENOUS; SUBCUTANEOUS EVERY 8 HOURS
Status: COMPLETED | OUTPATIENT
Start: 2021-02-18 | End: 2021-02-18

## 2021-02-18 RX ORDER — CIPROFLOXACIN 2 MG/ML
400 INJECTION, SOLUTION INTRAVENOUS
Status: COMPLETED | OUTPATIENT
Start: 2021-02-18 | End: 2021-02-19

## 2021-02-18 RX ORDER — HALOPERIDOL 5 MG/ML
0.5 INJECTION INTRAMUSCULAR EVERY 10 MIN PRN
Status: DISCONTINUED | OUTPATIENT
Start: 2021-02-18 | End: 2021-02-21 | Stop reason: HOSPADM

## 2021-02-18 RX ORDER — SODIUM CHLORIDE 9 MG/ML
INJECTION, SOLUTION INTRAVENOUS
Status: COMPLETED | OUTPATIENT
Start: 2021-02-18 | End: 2021-02-18

## 2021-02-18 RX ORDER — TRIPROLIDINE/PSEUDOEPHEDRINE 2.5MG-60MG
600 TABLET ORAL
Status: COMPLETED | OUTPATIENT
Start: 2021-02-18 | End: 2021-02-18

## 2021-02-18 RX ORDER — SODIUM CHLORIDE 0.9 % (FLUSH) 0.9 %
10 SYRINGE (ML) INJECTION
Status: DISCONTINUED | OUTPATIENT
Start: 2021-02-18 | End: 2021-02-21 | Stop reason: HOSPADM

## 2021-02-18 RX ORDER — HYDROMORPHONE HYDROCHLORIDE 2 MG/ML
INJECTION, SOLUTION INTRAMUSCULAR; INTRAVENOUS; SUBCUTANEOUS
Status: DISCONTINUED | OUTPATIENT
Start: 2021-02-18 | End: 2021-02-18

## 2021-02-18 RX ORDER — OXYCODONE HYDROCHLORIDE 5 MG/1
5 TABLET ORAL EVERY 6 HOURS PRN
Status: DISCONTINUED | OUTPATIENT
Start: 2021-02-18 | End: 2021-02-19

## 2021-02-18 RX ORDER — KETAMINE HCL IN 0.9 % NACL 50 MG/5 ML
SYRINGE (ML) INTRAVENOUS
Status: DISCONTINUED | OUTPATIENT
Start: 2021-02-18 | End: 2021-02-18

## 2021-02-18 RX ORDER — LIDOCAINE HYDROCHLORIDE ANHYDROUS AND DEXTROSE MONOHYDRATE .8; 5 G/100ML; G/100ML
INJECTION, SOLUTION INTRAVENOUS CONTINUOUS PRN
Status: DISCONTINUED | OUTPATIENT
Start: 2021-02-18 | End: 2021-02-18

## 2021-02-18 RX ORDER — LIDOCAINE HYDROCHLORIDE 10 MG/ML
1 INJECTION, SOLUTION EPIDURAL; INFILTRATION; INTRACAUDAL; PERINEURAL
Status: COMPLETED | OUTPATIENT
Start: 2021-02-18 | End: 2021-02-18

## 2021-02-18 RX ORDER — DIPHENHYDRAMINE HYDROCHLORIDE 50 MG/ML
12.5 INJECTION INTRAMUSCULAR; INTRAVENOUS EVERY 6 HOURS PRN
Status: DISCONTINUED | OUTPATIENT
Start: 2021-02-18 | End: 2021-02-21 | Stop reason: HOSPADM

## 2021-02-18 RX ORDER — TRAMADOL HYDROCHLORIDE 50 MG/1
50 TABLET ORAL EVERY 6 HOURS PRN
Status: DISCONTINUED | OUTPATIENT
Start: 2021-02-18 | End: 2021-02-21 | Stop reason: HOSPADM

## 2021-02-18 RX ORDER — IBUPROFEN 400 MG/1
800 TABLET ORAL EVERY 8 HOURS
Status: DISCONTINUED | OUTPATIENT
Start: 2021-02-19 | End: 2021-02-19

## 2021-02-18 RX ORDER — ACETAMINOPHEN 650 MG/20.3ML
975 LIQUID ORAL
Status: COMPLETED | OUTPATIENT
Start: 2021-02-18 | End: 2021-02-18

## 2021-02-18 RX ORDER — BUPIVACAINE HYDROCHLORIDE AND EPINEPHRINE 5; 5 MG/ML; UG/ML
INJECTION, SOLUTION EPIDURAL; INTRACAUDAL; PERINEURAL
Status: DISCONTINUED | OUTPATIENT
Start: 2021-02-18 | End: 2021-02-18 | Stop reason: HOSPADM

## 2021-02-18 RX ORDER — ZINC OXIDE 20 G/100G
OINTMENT TOPICAL
Status: DISCONTINUED | OUTPATIENT
Start: 2021-02-18 | End: 2021-02-21 | Stop reason: HOSPADM

## 2021-02-18 RX ORDER — OXYCODONE HYDROCHLORIDE 10 MG/1
10 TABLET ORAL EVERY 4 HOURS PRN
Status: DISCONTINUED | OUTPATIENT
Start: 2021-02-18 | End: 2021-02-21 | Stop reason: HOSPADM

## 2021-02-18 RX ORDER — MIDAZOLAM HYDROCHLORIDE 1 MG/ML
INJECTION, SOLUTION INTRAMUSCULAR; INTRAVENOUS
Status: DISCONTINUED | OUTPATIENT
Start: 2021-02-18 | End: 2021-02-18

## 2021-02-18 RX ORDER — ACETAMINOPHEN 500 MG
1000 TABLET ORAL EVERY 8 HOURS
Status: DISCONTINUED | OUTPATIENT
Start: 2021-02-19 | End: 2021-02-21 | Stop reason: HOSPADM

## 2021-02-18 RX ORDER — MUPIROCIN 20 MG/G
OINTMENT TOPICAL 2 TIMES DAILY
Status: DISCONTINUED | OUTPATIENT
Start: 2021-02-18 | End: 2021-02-21 | Stop reason: HOSPADM

## 2021-02-18 RX ORDER — METRONIDAZOLE 500 MG/100ML
500 INJECTION, SOLUTION INTRAVENOUS
Status: DISPENSED | OUTPATIENT
Start: 2021-02-18 | End: 2021-02-19

## 2021-02-18 RX ORDER — GABAPENTIN 300 MG/1
300 CAPSULE ORAL
Status: COMPLETED | OUTPATIENT
Start: 2021-02-18 | End: 2021-02-18

## 2021-02-18 RX ORDER — ONDANSETRON 8 MG/1
8 TABLET, ORALLY DISINTEGRATING ORAL EVERY 8 HOURS PRN
Status: DISCONTINUED | OUTPATIENT
Start: 2021-02-18 | End: 2021-02-21 | Stop reason: HOSPADM

## 2021-02-18 RX ORDER — GABAPENTIN 300 MG/1
300 CAPSULE ORAL 3 TIMES DAILY
Status: DISCONTINUED | OUTPATIENT
Start: 2021-02-18 | End: 2021-02-19

## 2021-02-18 RX ORDER — SODIUM CHLORIDE 9 MG/ML
INJECTION, SOLUTION INTRAVENOUS CONTINUOUS
Status: DISCONTINUED | OUTPATIENT
Start: 2021-02-18 | End: 2021-02-19

## 2021-02-18 RX ORDER — METRONIDAZOLE 500 MG/100ML
500 INJECTION, SOLUTION INTRAVENOUS
Status: COMPLETED | OUTPATIENT
Start: 2021-02-18 | End: 2021-02-18

## 2021-02-18 RX ORDER — LIDOCAINE HYDROCHLORIDE 20 MG/ML
INJECTION INTRAVENOUS
Status: DISCONTINUED | OUTPATIENT
Start: 2021-02-18 | End: 2021-02-18

## 2021-02-18 RX ORDER — EPHEDRINE SULFATE 50 MG/ML
INJECTION, SOLUTION INTRAVENOUS
Status: DISCONTINUED | OUTPATIENT
Start: 2021-02-18 | End: 2021-02-18

## 2021-02-18 RX ORDER — PROPOFOL 10 MG/ML
VIAL (ML) INTRAVENOUS
Status: DISCONTINUED | OUTPATIENT
Start: 2021-02-18 | End: 2021-02-18

## 2021-02-18 RX ORDER — ALVIMOPAN 12 MG/1
12 CAPSULE ORAL ONCE
Status: COMPLETED | OUTPATIENT
Start: 2021-02-18 | End: 2021-02-18

## 2021-02-18 RX ORDER — ACETAMINOPHEN 10 MG/ML
1000 INJECTION, SOLUTION INTRAVENOUS EVERY 8 HOURS
Status: COMPLETED | OUTPATIENT
Start: 2021-02-18 | End: 2021-02-19

## 2021-02-18 RX ORDER — SODIUM CHLORIDE 0.9 % (FLUSH) 0.9 %
3 SYRINGE (ML) INJECTION
Status: DISCONTINUED | OUTPATIENT
Start: 2021-02-18 | End: 2021-02-18 | Stop reason: HOSPADM

## 2021-02-18 RX ORDER — PHENYLEPHRINE HYDROCHLORIDE 10 MG/ML
INJECTION INTRAVENOUS
Status: DISCONTINUED | OUTPATIENT
Start: 2021-02-18 | End: 2021-02-18

## 2021-02-18 RX ORDER — FENTANYL CITRATE 50 UG/ML
INJECTION, SOLUTION INTRAMUSCULAR; INTRAVENOUS
Status: DISCONTINUED | OUTPATIENT
Start: 2021-02-18 | End: 2021-02-18

## 2021-02-18 RX ORDER — FENTANYL CITRATE 50 UG/ML
25 INJECTION, SOLUTION INTRAMUSCULAR; INTRAVENOUS EVERY 5 MIN PRN
Status: DISCONTINUED | OUTPATIENT
Start: 2021-02-18 | End: 2021-02-18 | Stop reason: HOSPADM

## 2021-02-18 RX ORDER — MUPIROCIN 20 MG/G
1 OINTMENT TOPICAL
Status: COMPLETED | OUTPATIENT
Start: 2021-02-18 | End: 2021-02-18

## 2021-02-18 RX ORDER — ROCURONIUM BROMIDE 10 MG/ML
INJECTION, SOLUTION INTRAVENOUS
Status: DISCONTINUED | OUTPATIENT
Start: 2021-02-18 | End: 2021-02-18

## 2021-02-18 RX ORDER — HEPARIN SODIUM 5000 [USP'U]/ML
5000 INJECTION, SOLUTION INTRAVENOUS; SUBCUTANEOUS EVERY 8 HOURS
Status: DISCONTINUED | OUTPATIENT
Start: 2021-02-18 | End: 2021-02-19

## 2021-02-18 RX ADMIN — PHENYLEPHRINE HYDROCHLORIDE 200 MCG: 10 INJECTION INTRAVENOUS at 02:02

## 2021-02-18 RX ADMIN — IBUPROFEN 800 MG: 800 INJECTION INTRAVENOUS at 10:02

## 2021-02-18 RX ADMIN — ROCURONIUM BROMIDE 50 MG: 10 INJECTION, SOLUTION INTRAVENOUS at 02:02

## 2021-02-18 RX ADMIN — OXYCODONE HYDROCHLORIDE 10 MG: 10 TABLET ORAL at 10:02

## 2021-02-18 RX ADMIN — PROPOFOL 150 MG: 10 INJECTION, EMULSION INTRAVENOUS at 02:02

## 2021-02-18 RX ADMIN — LIDOCAINE HYDROCHLORIDE 0.02 MG/KG/MIN: 8 INJECTION, SOLUTION INTRAVENOUS at 02:02

## 2021-02-18 RX ADMIN — SODIUM CHLORIDE: 0.9 INJECTION, SOLUTION INTRAVENOUS at 11:02

## 2021-02-18 RX ADMIN — METRONIDAZOLE 500 MG: 500 SOLUTION INTRAVENOUS at 02:02

## 2021-02-18 RX ADMIN — Medication 20 MG: at 02:02

## 2021-02-18 RX ADMIN — PHENYLEPHRINE HYDROCHLORIDE 150 MCG: 10 INJECTION INTRAVENOUS at 02:02

## 2021-02-18 RX ADMIN — ACETAMINOPHEN 976.6 MG: 160 SOLUTION ORAL at 11:02

## 2021-02-18 RX ADMIN — EPHEDRINE SULFATE 10 MG: 50 INJECTION INTRAVENOUS at 03:02

## 2021-02-18 RX ADMIN — PROMETHAZINE HYDROCHLORIDE 6.25 MG: 25 INJECTION INTRAMUSCULAR; INTRAVENOUS at 05:02

## 2021-02-18 RX ADMIN — EPHEDRINE SULFATE 5 MG: 50 INJECTION INTRAVENOUS at 02:02

## 2021-02-18 RX ADMIN — HALOPERIDOL LACTATE 0.5 MG: 5 INJECTION, SOLUTION INTRAMUSCULAR at 06:02

## 2021-02-18 RX ADMIN — CIPROFLOXACIN 400 MG: 2 INJECTION, SOLUTION INTRAVENOUS at 04:02

## 2021-02-18 RX ADMIN — FENTANYL CITRATE 100 MCG: 50 INJECTION, SOLUTION INTRAMUSCULAR; INTRAVENOUS at 02:02

## 2021-02-18 RX ADMIN — EPHEDRINE SULFATE 10 MG: 50 INJECTION INTRAVENOUS at 02:02

## 2021-02-18 RX ADMIN — ROCURONIUM BROMIDE 10 MG: 10 INJECTION, SOLUTION INTRAVENOUS at 03:02

## 2021-02-18 RX ADMIN — MUPIROCIN 1 G: 20 OINTMENT TOPICAL at 11:02

## 2021-02-18 RX ADMIN — IBUPROFEN 600 MG: 100 SUSPENSION ORAL at 11:02

## 2021-02-18 RX ADMIN — HEPARIN SODIUM 5000 UNITS: 5000 INJECTION INTRAVENOUS; SUBCUTANEOUS at 09:02

## 2021-02-18 RX ADMIN — DEXAMETHASONE SODIUM PHOSPHATE 8 MG: 4 INJECTION, SOLUTION INTRAMUSCULAR; INTRAVENOUS at 02:02

## 2021-02-18 RX ADMIN — ACETAMINOPHEN 1000 MG: 10 INJECTION, SOLUTION INTRAVENOUS at 09:02

## 2021-02-18 RX ADMIN — ONDANSETRON 8 MG: 8 TABLET, ORALLY DISINTEGRATING ORAL at 04:02

## 2021-02-18 RX ADMIN — GABAPENTIN 300 MG: 300 CAPSULE ORAL at 09:02

## 2021-02-18 RX ADMIN — LIDOCAINE HYDROCHLORIDE 0.1 MG: 10 INJECTION, SOLUTION EPIDURAL; INFILTRATION; INTRACAUDAL at 11:02

## 2021-02-18 RX ADMIN — HYDROMORPHONE HYDROCHLORIDE 1 MG: 2 INJECTION, SOLUTION INTRAMUSCULAR; INTRAVENOUS; SUBCUTANEOUS at 03:02

## 2021-02-18 RX ADMIN — PROMETHAZINE HYDROCHLORIDE 6.25 MG: 25 INJECTION INTRAMUSCULAR; INTRAVENOUS at 04:02

## 2021-02-18 RX ADMIN — GABAPENTIN 300 MG: 300 CAPSULE ORAL at 11:02

## 2021-02-18 RX ADMIN — SODIUM CHLORIDE: 0.9 INJECTION, SOLUTION INTRAVENOUS at 04:02

## 2021-02-18 RX ADMIN — MUPIROCIN: 20 OINTMENT TOPICAL at 09:02

## 2021-02-18 RX ADMIN — ALVIMOPAN 12 MG: 12 CAPSULE ORAL at 11:02

## 2021-02-18 RX ADMIN — HEPARIN SODIUM 5000 UNITS: 5000 INJECTION INTRAVENOUS; SUBCUTANEOUS at 11:02

## 2021-02-18 RX ADMIN — MIDAZOLAM HYDROCHLORIDE 2 MG: 1 INJECTION, SOLUTION INTRAMUSCULAR; INTRAVENOUS at 01:02

## 2021-02-18 RX ADMIN — EPHEDRINE SULFATE 15 MG: 50 INJECTION INTRAVENOUS at 02:02

## 2021-02-18 RX ADMIN — CEFTRIAXONE SODIUM 2 G: 2 INJECTION, SOLUTION INTRAVENOUS at 02:02

## 2021-02-18 RX ADMIN — LIDOCAINE HYDROCHLORIDE 100 MG: 20 INJECTION, SOLUTION INTRAVENOUS at 02:02

## 2021-02-19 LAB
ANION GAP SERPL CALC-SCNC: 9 MMOL/L (ref 8–16)
BASOPHILS # BLD AUTO: 0.01 K/UL (ref 0–0.2)
BASOPHILS NFR BLD: 0.1 % (ref 0–1.9)
BUN SERPL-MCNC: 9 MG/DL (ref 6–20)
CALCIUM SERPL-MCNC: 8.8 MG/DL (ref 8.7–10.5)
CHLORIDE SERPL-SCNC: 107 MMOL/L (ref 95–110)
CO2 SERPL-SCNC: 23 MMOL/L (ref 23–29)
CREAT SERPL-MCNC: 0.7 MG/DL (ref 0.5–1.4)
DIFFERENTIAL METHOD: ABNORMAL
EOSINOPHIL # BLD AUTO: 0 K/UL (ref 0–0.5)
EOSINOPHIL NFR BLD: 0 % (ref 0–8)
ERYTHROCYTE [DISTWIDTH] IN BLOOD BY AUTOMATED COUNT: 15.2 % (ref 11.5–14.5)
EST. GFR  (AFRICAN AMERICAN): >60 ML/MIN/1.73 M^2
EST. GFR  (NON AFRICAN AMERICAN): >60 ML/MIN/1.73 M^2
GLUCOSE SERPL-MCNC: 108 MG/DL (ref 70–110)
HCT VFR BLD AUTO: 30.8 % (ref 37–48.5)
HGB BLD-MCNC: 9.8 G/DL (ref 12–16)
IMM GRANULOCYTES # BLD AUTO: 0.04 K/UL (ref 0–0.04)
IMM GRANULOCYTES NFR BLD AUTO: 0.4 % (ref 0–0.5)
LYMPHOCYTES # BLD AUTO: 0.8 K/UL (ref 1–4.8)
LYMPHOCYTES NFR BLD: 7 % (ref 18–48)
MAGNESIUM SERPL-MCNC: 1.8 MG/DL (ref 1.6–2.6)
MCH RBC QN AUTO: 26 PG (ref 27–31)
MCHC RBC AUTO-ENTMCNC: 31.8 G/DL (ref 32–36)
MCV RBC AUTO: 82 FL (ref 82–98)
MONOCYTES # BLD AUTO: 0.4 K/UL (ref 0.3–1)
MONOCYTES NFR BLD: 3.4 % (ref 4–15)
NEUTROPHILS # BLD AUTO: 10 K/UL (ref 1.8–7.7)
NEUTROPHILS NFR BLD: 89.1 % (ref 38–73)
NRBC BLD-RTO: 0 /100 WBC
PHOSPHATE SERPL-MCNC: 3.3 MG/DL (ref 2.7–4.5)
PLATELET # BLD AUTO: 296 K/UL (ref 150–350)
PMV BLD AUTO: 11.6 FL (ref 9.2–12.9)
POTASSIUM SERPL-SCNC: 4.3 MMOL/L (ref 3.5–5.1)
RBC # BLD AUTO: 3.77 M/UL (ref 4–5.4)
SODIUM SERPL-SCNC: 139 MMOL/L (ref 136–145)
WBC # BLD AUTO: 11.21 K/UL (ref 3.9–12.7)

## 2021-02-19 PROCEDURE — 36415 COLL VENOUS BLD VENIPUNCTURE: CPT

## 2021-02-19 PROCEDURE — S0030 INJECTION, METRONIDAZOLE: HCPCS | Performed by: STUDENT IN AN ORGANIZED HEALTH CARE EDUCATION/TRAINING PROGRAM

## 2021-02-19 PROCEDURE — 80048 BASIC METABOLIC PNL TOTAL CA: CPT

## 2021-02-19 PROCEDURE — 63600175 PHARM REV CODE 636 W HCPCS: Performed by: STUDENT IN AN ORGANIZED HEALTH CARE EDUCATION/TRAINING PROGRAM

## 2021-02-19 PROCEDURE — 20600001 HC STEP DOWN PRIVATE ROOM

## 2021-02-19 PROCEDURE — 25000003 PHARM REV CODE 250: Performed by: STUDENT IN AN ORGANIZED HEALTH CARE EDUCATION/TRAINING PROGRAM

## 2021-02-19 PROCEDURE — 85025 COMPLETE CBC W/AUTO DIFF WBC: CPT

## 2021-02-19 PROCEDURE — 84100 ASSAY OF PHOSPHORUS: CPT

## 2021-02-19 PROCEDURE — 83735 ASSAY OF MAGNESIUM: CPT

## 2021-02-19 RX ORDER — ENOXAPARIN SODIUM 100 MG/ML
40 INJECTION SUBCUTANEOUS EVERY 24 HOURS
Status: DISCONTINUED | OUTPATIENT
Start: 2021-02-19 | End: 2021-02-21 | Stop reason: HOSPADM

## 2021-02-19 RX ORDER — OXYCODONE HYDROCHLORIDE 5 MG/1
5 TABLET ORAL EVERY 4 HOURS PRN
Status: DISCONTINUED | OUTPATIENT
Start: 2021-02-19 | End: 2021-02-21 | Stop reason: HOSPADM

## 2021-02-19 RX ORDER — KETOROLAC TROMETHAMINE 15 MG/ML
15 INJECTION, SOLUTION INTRAMUSCULAR; INTRAVENOUS EVERY 8 HOURS
Status: DISCONTINUED | OUTPATIENT
Start: 2021-02-19 | End: 2021-02-21 | Stop reason: HOSPADM

## 2021-02-19 RX ADMIN — OXYCODONE HYDROCHLORIDE 10 MG: 10 TABLET ORAL at 08:02

## 2021-02-19 RX ADMIN — ACETAMINOPHEN 1000 MG: 10 INJECTION, SOLUTION INTRAVENOUS at 06:02

## 2021-02-19 RX ADMIN — OXYCODONE 5 MG: 5 TABLET ORAL at 03:02

## 2021-02-19 RX ADMIN — ACETAMINOPHEN 1000 MG: 10 INJECTION, SOLUTION INTRAVENOUS at 02:02

## 2021-02-19 RX ADMIN — IBUPROFEN 800 MG: 800 INJECTION INTRAVENOUS at 05:02

## 2021-02-19 RX ADMIN — KETOROLAC TROMETHAMINE 15 MG: 15 INJECTION, SOLUTION INTRAMUSCULAR; INTRAVENOUS at 02:02

## 2021-02-19 RX ADMIN — OXYCODONE HYDROCHLORIDE 10 MG: 10 TABLET ORAL at 06:02

## 2021-02-19 RX ADMIN — METHOCARBAMOL 500 MG: 100 INJECTION, SOLUTION INTRAMUSCULAR; INTRAVENOUS at 09:02

## 2021-02-19 RX ADMIN — METRONIDAZOLE 500 MG: 500 INJECTION, SOLUTION INTRAVENOUS at 01:02

## 2021-02-19 RX ADMIN — OXYCODONE HYDROCHLORIDE 10 MG: 10 TABLET ORAL at 04:02

## 2021-02-19 RX ADMIN — GABAPENTIN 300 MG: 300 CAPSULE ORAL at 02:02

## 2021-02-19 RX ADMIN — METHOCARBAMOL 500 MG: 100 INJECTION, SOLUTION INTRAMUSCULAR; INTRAVENOUS at 02:02

## 2021-02-19 RX ADMIN — GABAPENTIN 300 MG: 300 CAPSULE ORAL at 08:02

## 2021-02-19 RX ADMIN — GABAPENTIN 300 MG: 300 CAPSULE ORAL at 09:02

## 2021-02-19 RX ADMIN — HEPARIN SODIUM 5000 UNITS: 5000 INJECTION INTRAVENOUS; SUBCUTANEOUS at 05:02

## 2021-02-19 RX ADMIN — ACETAMINOPHEN 1000 MG: 500 TABLET ORAL at 09:02

## 2021-02-19 RX ADMIN — MUPIROCIN: 20 OINTMENT TOPICAL at 09:02

## 2021-02-19 RX ADMIN — DIPHENHYDRAMINE HYDROCHLORIDE 12.5 MG: 50 INJECTION, SOLUTION INTRAMUSCULAR; INTRAVENOUS at 02:02

## 2021-02-19 RX ADMIN — CIPROFLOXACIN 400 MG: 2 INJECTION, SOLUTION INTRAVENOUS at 04:02

## 2021-02-19 RX ADMIN — ENOXAPARIN SODIUM 40 MG: 40 INJECTION SUBCUTANEOUS at 04:02

## 2021-02-19 RX ADMIN — TRAMADOL HYDROCHLORIDE 50 MG: 50 TABLET, COATED ORAL at 10:02

## 2021-02-19 RX ADMIN — KETOROLAC TROMETHAMINE 15 MG: 15 INJECTION, SOLUTION INTRAMUSCULAR; INTRAVENOUS at 09:02

## 2021-02-19 RX ADMIN — MUPIROCIN: 20 OINTMENT TOPICAL at 08:02

## 2021-02-19 RX ADMIN — METRONIDAZOLE 500 MG: 500 INJECTION, SOLUTION INTRAVENOUS at 08:02

## 2021-02-19 RX ADMIN — TRAMADOL HYDROCHLORIDE 50 MG: 50 TABLET, COATED ORAL at 02:02

## 2021-02-20 PROCEDURE — 94761 N-INVAS EAR/PLS OXIMETRY MLT: CPT

## 2021-02-20 PROCEDURE — 25000003 PHARM REV CODE 250: Performed by: STUDENT IN AN ORGANIZED HEALTH CARE EDUCATION/TRAINING PROGRAM

## 2021-02-20 PROCEDURE — 63600175 PHARM REV CODE 636 W HCPCS: Performed by: STUDENT IN AN ORGANIZED HEALTH CARE EDUCATION/TRAINING PROGRAM

## 2021-02-20 PROCEDURE — 20600001 HC STEP DOWN PRIVATE ROOM

## 2021-02-20 RX ADMIN — OXYCODONE HYDROCHLORIDE 10 MG: 10 TABLET ORAL at 01:02

## 2021-02-20 RX ADMIN — ONDANSETRON 8 MG: 8 TABLET, ORALLY DISINTEGRATING ORAL at 03:02

## 2021-02-20 RX ADMIN — TRAMADOL HYDROCHLORIDE 50 MG: 50 TABLET, COATED ORAL at 04:02

## 2021-02-20 RX ADMIN — KETOROLAC TROMETHAMINE 15 MG: 15 INJECTION, SOLUTION INTRAMUSCULAR; INTRAVENOUS at 01:02

## 2021-02-20 RX ADMIN — KETOROLAC TROMETHAMINE 15 MG: 15 INJECTION, SOLUTION INTRAMUSCULAR; INTRAVENOUS at 09:02

## 2021-02-20 RX ADMIN — METHOCARBAMOL 500 MG: 100 INJECTION, SOLUTION INTRAMUSCULAR; INTRAVENOUS at 10:02

## 2021-02-20 RX ADMIN — OXYCODONE HYDROCHLORIDE 10 MG: 10 TABLET ORAL at 09:02

## 2021-02-20 RX ADMIN — MUPIROCIN: 20 OINTMENT TOPICAL at 09:02

## 2021-02-20 RX ADMIN — GABAPENTIN 300 MG: 300 CAPSULE ORAL at 02:02

## 2021-02-20 RX ADMIN — GABAPENTIN 300 MG: 300 CAPSULE ORAL at 09:02

## 2021-02-20 RX ADMIN — ENOXAPARIN SODIUM 40 MG: 40 INJECTION SUBCUTANEOUS at 04:02

## 2021-02-20 RX ADMIN — METHOCARBAMOL 500 MG: 100 INJECTION, SOLUTION INTRAMUSCULAR; INTRAVENOUS at 05:02

## 2021-02-20 RX ADMIN — DIPHENHYDRAMINE HYDROCHLORIDE 12.5 MG: 50 INJECTION, SOLUTION INTRAMUSCULAR; INTRAVENOUS at 09:02

## 2021-02-20 RX ADMIN — METHOCARBAMOL 500 MG: 100 INJECTION, SOLUTION INTRAMUSCULAR; INTRAVENOUS at 01:02

## 2021-02-20 RX ADMIN — KETOROLAC TROMETHAMINE 15 MG: 15 INJECTION, SOLUTION INTRAMUSCULAR; INTRAVENOUS at 05:02

## 2021-02-20 RX ADMIN — ACETAMINOPHEN 1000 MG: 500 TABLET ORAL at 01:02

## 2021-02-20 RX ADMIN — ACETAMINOPHEN 1000 MG: 500 TABLET ORAL at 05:02

## 2021-02-21 VITALS
SYSTOLIC BLOOD PRESSURE: 91 MMHG | DIASTOLIC BLOOD PRESSURE: 55 MMHG | TEMPERATURE: 97 F | BODY MASS INDEX: 20.38 KG/M2 | HEIGHT: 63 IN | OXYGEN SATURATION: 100 % | WEIGHT: 115 LBS | RESPIRATION RATE: 20 BRPM | HEART RATE: 60 BPM

## 2021-02-21 LAB — CRP SERPL-MCNC: 2.1 MG/L (ref 0–8.2)

## 2021-02-21 PROCEDURE — 36415 COLL VENOUS BLD VENIPUNCTURE: CPT

## 2021-02-21 PROCEDURE — 25000242 PHARM REV CODE 250 ALT 637 W/ HCPCS: Performed by: SURGERY

## 2021-02-21 PROCEDURE — 86140 C-REACTIVE PROTEIN: CPT

## 2021-02-21 PROCEDURE — 25000003 PHARM REV CODE 250: Performed by: STUDENT IN AN ORGANIZED HEALTH CARE EDUCATION/TRAINING PROGRAM

## 2021-02-21 PROCEDURE — 63600175 PHARM REV CODE 636 W HCPCS: Performed by: STUDENT IN AN ORGANIZED HEALTH CARE EDUCATION/TRAINING PROGRAM

## 2021-02-21 RX ORDER — OXYCODONE AND ACETAMINOPHEN 5; 325 MG/1; MG/1
1 TABLET ORAL EVERY 4 HOURS PRN
Qty: 24 TABLET | Refills: 0 | Status: ON HOLD | OUTPATIENT
Start: 2021-02-21 | End: 2021-03-04 | Stop reason: HOSPADM

## 2021-02-21 RX ADMIN — ACETAMINOPHEN 1000 MG: 500 TABLET ORAL at 06:02

## 2021-02-21 RX ADMIN — OXYCODONE HYDROCHLORIDE 10 MG: 10 TABLET ORAL at 03:02

## 2021-02-21 RX ADMIN — METHOCARBAMOL 500 MG: 100 INJECTION, SOLUTION INTRAMUSCULAR; INTRAVENOUS at 06:02

## 2021-02-21 RX ADMIN — PSYLLIUM HUSK 1 PACKET: 3.4 POWDER ORAL at 09:02

## 2021-02-21 RX ADMIN — KETOROLAC TROMETHAMINE 15 MG: 15 INJECTION, SOLUTION INTRAMUSCULAR; INTRAVENOUS at 06:02

## 2021-02-21 RX ADMIN — MUPIROCIN: 20 OINTMENT TOPICAL at 09:02

## 2021-02-22 ENCOUNTER — HOSPITAL ENCOUNTER (INPATIENT)
Facility: HOSPITAL | Age: 26
LOS: 10 days | Discharge: HOME | DRG: 390 | End: 2021-03-04
Attending: COLON & RECTAL SURGERY | Admitting: COLON & RECTAL SURGERY
Payer: MEDICAID

## 2021-02-22 ENCOUNTER — PATIENT MESSAGE (OUTPATIENT)
Dept: SURGERY | Facility: CLINIC | Age: 26
End: 2021-02-22

## 2021-02-22 ENCOUNTER — TELEPHONE (OUTPATIENT)
Dept: SURGERY | Facility: CLINIC | Age: 26
End: 2021-02-22

## 2021-02-22 DIAGNOSIS — K56.7 ILEUS: ICD-10-CM

## 2021-02-22 DIAGNOSIS — R10.9 ABDOMINAL PAIN: ICD-10-CM

## 2021-02-22 DIAGNOSIS — G89.18 POST-OP PAIN: Primary | ICD-10-CM

## 2021-02-22 DIAGNOSIS — R00.0 TACHYCARDIA: ICD-10-CM

## 2021-02-22 DIAGNOSIS — Z98.890 S/P CLOSURE OF ILEOSTOMY: ICD-10-CM

## 2021-02-22 LAB
ALBUMIN SERPL BCP-MCNC: 3.9 G/DL (ref 3.5–5.2)
ALP SERPL-CCNC: 52 U/L (ref 55–135)
ALT SERPL W/O P-5'-P-CCNC: 36 U/L (ref 10–44)
ANION GAP SERPL CALC-SCNC: 10 MMOL/L (ref 8–16)
AST SERPL-CCNC: 29 U/L (ref 10–40)
B-HCG UR QL: NEGATIVE
BACTERIA #/AREA URNS AUTO: ABNORMAL /HPF
BASOPHILS # BLD AUTO: 0.02 K/UL (ref 0–0.2)
BASOPHILS NFR BLD: 0.2 % (ref 0–1.9)
BILIRUB SERPL-MCNC: 0.4 MG/DL (ref 0.1–1)
BILIRUB UR QL STRIP: ABNORMAL
BUN SERPL-MCNC: 11 MG/DL (ref 6–20)
CALCIUM SERPL-MCNC: 9.2 MG/DL (ref 8.7–10.5)
CHLORIDE SERPL-SCNC: 104 MMOL/L (ref 95–110)
CLARITY UR REFRACT.AUTO: ABNORMAL
CO2 SERPL-SCNC: 25 MMOL/L (ref 23–29)
COLOR UR AUTO: YELLOW
CREAT SERPL-MCNC: 0.7 MG/DL (ref 0.5–1.4)
CRP SERPL-MCNC: 11.8 MG/L (ref 0–8.2)
CTP QC/QA: YES
CTP QC/QA: YES
DIFFERENTIAL METHOD: ABNORMAL
EOSINOPHIL # BLD AUTO: 0.2 K/UL (ref 0–0.5)
EOSINOPHIL NFR BLD: 2 % (ref 0–8)
ERYTHROCYTE [DISTWIDTH] IN BLOOD BY AUTOMATED COUNT: 15.9 % (ref 11.5–14.5)
EST. GFR  (AFRICAN AMERICAN): >60 ML/MIN/1.73 M^2
EST. GFR  (NON AFRICAN AMERICAN): >60 ML/MIN/1.73 M^2
GLUCOSE SERPL-MCNC: 84 MG/DL (ref 70–110)
GLUCOSE UR QL STRIP: NEGATIVE
HCT VFR BLD AUTO: 33.9 % (ref 37–48.5)
HGB BLD-MCNC: 10.8 G/DL (ref 12–16)
HGB UR QL STRIP: NEGATIVE
HYALINE CASTS UR QL AUTO: 0 /LPF
IMM GRANULOCYTES # BLD AUTO: 0.03 K/UL (ref 0–0.04)
IMM GRANULOCYTES NFR BLD AUTO: 0.3 % (ref 0–0.5)
KETONES UR QL STRIP: ABNORMAL
LEUKOCYTE ESTERASE UR QL STRIP: ABNORMAL
LYMPHOCYTES # BLD AUTO: 2 K/UL (ref 1–4.8)
LYMPHOCYTES NFR BLD: 22.5 % (ref 18–48)
MAGNESIUM SERPL-MCNC: 1.5 MG/DL (ref 1.6–2.6)
MCH RBC QN AUTO: 26.2 PG (ref 27–31)
MCHC RBC AUTO-ENTMCNC: 31.9 G/DL (ref 32–36)
MCV RBC AUTO: 82 FL (ref 82–98)
MICROSCOPIC COMMENT: ABNORMAL
MONOCYTES # BLD AUTO: 0.5 K/UL (ref 0.3–1)
MONOCYTES NFR BLD: 6.1 % (ref 4–15)
NEUTROPHILS # BLD AUTO: 6.1 K/UL (ref 1.8–7.7)
NEUTROPHILS NFR BLD: 68.9 % (ref 38–73)
NITRITE UR QL STRIP: NEGATIVE
NRBC BLD-RTO: 0 /100 WBC
PH UR STRIP: 6 [PH] (ref 5–8)
PLATELET # BLD AUTO: 268 K/UL (ref 150–350)
PMV BLD AUTO: 11.2 FL (ref 9.2–12.9)
POTASSIUM SERPL-SCNC: 3.4 MMOL/L (ref 3.5–5.1)
PROT SERPL-MCNC: 6.9 G/DL (ref 6–8.4)
PROT UR QL STRIP: ABNORMAL
RBC # BLD AUTO: 4.13 M/UL (ref 4–5.4)
RBC #/AREA URNS AUTO: 0 /HPF (ref 0–4)
SARS-COV-2 RDRP RESP QL NAA+PROBE: NEGATIVE
SODIUM SERPL-SCNC: 139 MMOL/L (ref 136–145)
SP GR UR STRIP: >=1.03 (ref 1–1.03)
URN SPEC COLLECT METH UR: ABNORMAL
WBC # BLD AUTO: 8.87 K/UL (ref 3.9–12.7)
WBC #/AREA URNS AUTO: 4 /HPF (ref 0–5)

## 2021-02-22 PROCEDURE — 96374 THER/PROPH/DIAG INJ IV PUSH: CPT

## 2021-02-22 PROCEDURE — 80053 COMPREHEN METABOLIC PANEL: CPT

## 2021-02-22 PROCEDURE — 96375 TX/PRO/DX INJ NEW DRUG ADDON: CPT

## 2021-02-22 PROCEDURE — 63600175 PHARM REV CODE 636 W HCPCS: Performed by: EMERGENCY MEDICINE

## 2021-02-22 PROCEDURE — 96361 HYDRATE IV INFUSION ADD-ON: CPT

## 2021-02-22 PROCEDURE — 81001 URINALYSIS AUTO W/SCOPE: CPT

## 2021-02-22 PROCEDURE — 25000003 PHARM REV CODE 250: Performed by: EMERGENCY MEDICINE

## 2021-02-22 PROCEDURE — 99285 EMERGENCY DEPT VISIT HI MDM: CPT | Mod: 25

## 2021-02-22 PROCEDURE — 25000003 PHARM REV CODE 250: Performed by: STUDENT IN AN ORGANIZED HEALTH CARE EDUCATION/TRAINING PROGRAM

## 2021-02-22 PROCEDURE — 81025 URINE PREGNANCY TEST: CPT | Performed by: EMERGENCY MEDICINE

## 2021-02-22 PROCEDURE — 63600175 PHARM REV CODE 636 W HCPCS: Performed by: STUDENT IN AN ORGANIZED HEALTH CARE EDUCATION/TRAINING PROGRAM

## 2021-02-22 PROCEDURE — 85025 COMPLETE CBC W/AUTO DIFF WBC: CPT

## 2021-02-22 PROCEDURE — 96372 THER/PROPH/DIAG INJ SC/IM: CPT | Mod: 59

## 2021-02-22 PROCEDURE — G0378 HOSPITAL OBSERVATION PER HR: HCPCS

## 2021-02-22 PROCEDURE — 86703 HIV-1/HIV-2 1 RESULT ANTBDY: CPT

## 2021-02-22 PROCEDURE — 96372 THER/PROPH/DIAG INJ SC/IM: CPT

## 2021-02-22 PROCEDURE — 12000002 HC ACUTE/MED SURGE SEMI-PRIVATE ROOM

## 2021-02-22 PROCEDURE — 83735 ASSAY OF MAGNESIUM: CPT

## 2021-02-22 PROCEDURE — U0002 COVID-19 LAB TEST NON-CDC: HCPCS | Performed by: EMERGENCY MEDICINE

## 2021-02-22 PROCEDURE — 99285 PR EMERGENCY DEPT VISIT,LEVEL V: ICD-10-PCS | Mod: ,,, | Performed by: EMERGENCY MEDICINE

## 2021-02-22 PROCEDURE — 86140 C-REACTIVE PROTEIN: CPT

## 2021-02-22 PROCEDURE — A4216 STERILE WATER/SALINE, 10 ML: HCPCS | Performed by: STUDENT IN AN ORGANIZED HEALTH CARE EDUCATION/TRAINING PROGRAM

## 2021-02-22 PROCEDURE — 86803 HEPATITIS C AB TEST: CPT

## 2021-02-22 PROCEDURE — 99285 EMERGENCY DEPT VISIT HI MDM: CPT | Mod: ,,, | Performed by: EMERGENCY MEDICINE

## 2021-02-22 RX ORDER — LIDOCAINE HYDROCHLORIDE 10 MG/ML
1 INJECTION, SOLUTION EPIDURAL; INFILTRATION; INTRACAUDAL; PERINEURAL ONCE
Status: DISCONTINUED | OUTPATIENT
Start: 2021-02-22 | End: 2021-02-22

## 2021-02-22 RX ORDER — ONDANSETRON 4 MG/1
4 TABLET, ORALLY DISINTEGRATING ORAL EVERY 6 HOURS PRN
Qty: 20 TABLET | Refills: 2 | Status: SHIPPED | OUTPATIENT
Start: 2021-02-22 | End: 2023-08-09

## 2021-02-22 RX ORDER — TRAMADOL HYDROCHLORIDE 50 MG/1
50 TABLET ORAL EVERY 6 HOURS PRN
Status: DISCONTINUED | OUTPATIENT
Start: 2021-02-22 | End: 2021-03-05 | Stop reason: HOSPADM

## 2021-02-22 RX ORDER — ENOXAPARIN SODIUM 100 MG/ML
40 INJECTION SUBCUTANEOUS EVERY 24 HOURS
Status: DISCONTINUED | OUTPATIENT
Start: 2021-02-22 | End: 2021-03-05 | Stop reason: HOSPADM

## 2021-02-22 RX ORDER — ACETAMINOPHEN 325 MG/1
650 TABLET ORAL EVERY 8 HOURS PRN
Status: DISCONTINUED | OUTPATIENT
Start: 2021-02-22 | End: 2021-03-05 | Stop reason: HOSPADM

## 2021-02-22 RX ORDER — MORPHINE SULFATE 4 MG/ML
4 INJECTION, SOLUTION INTRAMUSCULAR; INTRAVENOUS
Status: COMPLETED | OUTPATIENT
Start: 2021-02-22 | End: 2021-02-22

## 2021-02-22 RX ORDER — GABAPENTIN 300 MG/1
300 CAPSULE ORAL 3 TIMES DAILY
Status: DISCONTINUED | OUTPATIENT
Start: 2021-02-22 | End: 2021-02-23

## 2021-02-22 RX ORDER — SODIUM CHLORIDE 0.9 % (FLUSH) 0.9 %
3 SYRINGE (ML) INJECTION EVERY 8 HOURS
Status: DISCONTINUED | OUTPATIENT
Start: 2021-02-22 | End: 2021-03-05 | Stop reason: HOSPADM

## 2021-02-22 RX ORDER — TALC
6 POWDER (GRAM) TOPICAL NIGHTLY PRN
Status: DISCONTINUED | OUTPATIENT
Start: 2021-02-22 | End: 2021-03-05 | Stop reason: HOSPADM

## 2021-02-22 RX ORDER — IBUPROFEN 600 MG/1
600 TABLET ORAL EVERY 6 HOURS PRN
Status: DISCONTINUED | OUTPATIENT
Start: 2021-02-22 | End: 2021-03-05 | Stop reason: HOSPADM

## 2021-02-22 RX ORDER — ONDANSETRON 4 MG/1
4 TABLET, ORALLY DISINTEGRATING ORAL EVERY 6 HOURS PRN
Qty: 20 TABLET | Refills: 2 | Status: SHIPPED | OUTPATIENT
Start: 2021-02-22 | End: 2021-02-22 | Stop reason: SDUPTHER

## 2021-02-22 RX ORDER — ONDANSETRON 2 MG/ML
4 INJECTION INTRAMUSCULAR; INTRAVENOUS
Status: COMPLETED | OUTPATIENT
Start: 2021-02-22 | End: 2021-02-22

## 2021-02-22 RX ORDER — SODIUM CHLORIDE 9 MG/ML
INJECTION, SOLUTION INTRAVENOUS CONTINUOUS
Status: DISCONTINUED | OUTPATIENT
Start: 2021-02-22 | End: 2021-02-23

## 2021-02-22 RX ORDER — ONDANSETRON 4 MG/1
4 TABLET, ORALLY DISINTEGRATING ORAL EVERY 6 HOURS PRN
Status: DISCONTINUED | OUTPATIENT
Start: 2021-02-22 | End: 2021-03-05 | Stop reason: HOSPADM

## 2021-02-22 RX ORDER — MORPHINE SULFATE 4 MG/ML
4 INJECTION, SOLUTION INTRAMUSCULAR; INTRAVENOUS EVERY 4 HOURS PRN
Status: DISCONTINUED | OUTPATIENT
Start: 2021-02-22 | End: 2021-02-23

## 2021-02-22 RX ORDER — KETOROLAC TROMETHAMINE 30 MG/ML
15 INJECTION, SOLUTION INTRAMUSCULAR; INTRAVENOUS EVERY 8 HOURS
Status: DISCONTINUED | OUTPATIENT
Start: 2021-02-22 | End: 2021-02-23

## 2021-02-22 RX ORDER — METHOCARBAMOL 500 MG/1
500 TABLET, FILM COATED ORAL 4 TIMES DAILY
Status: DISCONTINUED | OUTPATIENT
Start: 2021-02-23 | End: 2021-03-05 | Stop reason: HOSPADM

## 2021-02-22 RX ADMIN — KETOROLAC TROMETHAMINE 15 MG: 30 INJECTION, SOLUTION INTRAMUSCULAR; INTRAVENOUS at 10:02

## 2021-02-22 RX ADMIN — ONDANSETRON 4 MG: 4 TABLET, ORALLY DISINTEGRATING ORAL at 08:02

## 2021-02-22 RX ADMIN — Medication 3 ML: at 10:02

## 2021-02-22 RX ADMIN — GABAPENTIN 300 MG: 300 CAPSULE ORAL at 08:02

## 2021-02-22 RX ADMIN — SODIUM CHLORIDE: 0.9 INJECTION, SOLUTION INTRAVENOUS at 08:02

## 2021-02-22 RX ADMIN — ONDANSETRON 4 MG: 2 INJECTION INTRAMUSCULAR; INTRAVENOUS at 06:02

## 2021-02-22 RX ADMIN — TRAMADOL HYDROCHLORIDE 50 MG: 50 TABLET, COATED ORAL at 10:02

## 2021-02-22 RX ADMIN — ENOXAPARIN SODIUM 40 MG: 40 INJECTION SUBCUTANEOUS at 08:02

## 2021-02-22 RX ADMIN — MORPHINE SULFATE 4 MG: 4 INJECTION INTRAVENOUS at 06:02

## 2021-02-22 RX ADMIN — SODIUM CHLORIDE 500 ML: 0.9 INJECTION, SOLUTION INTRAVENOUS at 06:02

## 2021-02-23 LAB
HCV AB SERPL QL IA: NEGATIVE
HIV 1+2 AB+HIV1 P24 AG SERPL QL IA: NEGATIVE

## 2021-02-23 PROCEDURE — 96361 HYDRATE IV INFUSION ADD-ON: CPT

## 2021-02-23 PROCEDURE — 25000003 PHARM REV CODE 250: Performed by: STUDENT IN AN ORGANIZED HEALTH CARE EDUCATION/TRAINING PROGRAM

## 2021-02-23 PROCEDURE — 96365 THER/PROPH/DIAG IV INF INIT: CPT

## 2021-02-23 PROCEDURE — 25000003 PHARM REV CODE 250: Performed by: COLON & RECTAL SURGERY

## 2021-02-23 PROCEDURE — 20600001 HC STEP DOWN PRIVATE ROOM

## 2021-02-23 PROCEDURE — 63600175 PHARM REV CODE 636 W HCPCS: Performed by: STUDENT IN AN ORGANIZED HEALTH CARE EDUCATION/TRAINING PROGRAM

## 2021-02-23 PROCEDURE — A4216 STERILE WATER/SALINE, 10 ML: HCPCS | Performed by: STUDENT IN AN ORGANIZED HEALTH CARE EDUCATION/TRAINING PROGRAM

## 2021-02-23 RX ORDER — GABAPENTIN 250 MG/5ML
300 SOLUTION ORAL 3 TIMES DAILY
Status: DISCONTINUED | OUTPATIENT
Start: 2021-02-23 | End: 2021-02-23

## 2021-02-23 RX ORDER — TRAMADOL HYDROCHLORIDE 50 MG/1
50 TABLET ORAL ONCE
Status: COMPLETED | OUTPATIENT
Start: 2021-02-23 | End: 2021-02-23

## 2021-02-23 RX ORDER — POTASSIUM CHLORIDE 7.45 MG/ML
40 INJECTION INTRAVENOUS
Status: DISPENSED | OUTPATIENT
Start: 2021-02-23 | End: 2021-02-23

## 2021-02-23 RX ORDER — GABAPENTIN 300 MG/1
300 CAPSULE ORAL 3 TIMES DAILY
Status: DISCONTINUED | OUTPATIENT
Start: 2021-02-23 | End: 2021-03-05 | Stop reason: HOSPADM

## 2021-02-23 RX ORDER — MAGNESIUM SULFATE HEPTAHYDRATE 40 MG/ML
2 INJECTION, SOLUTION INTRAVENOUS ONCE
Status: COMPLETED | OUTPATIENT
Start: 2021-02-23 | End: 2021-02-23

## 2021-02-23 RX ADMIN — KETOROLAC TROMETHAMINE 15 MG: 30 INJECTION, SOLUTION INTRAMUSCULAR; INTRAVENOUS at 06:02

## 2021-02-23 RX ADMIN — MORPHINE SULFATE 4 MG: 4 INJECTION INTRAVENOUS at 03:02

## 2021-02-23 RX ADMIN — Medication 3 ML: at 03:02

## 2021-02-23 RX ADMIN — TRAMADOL HYDROCHLORIDE 50 MG: 50 TABLET, COATED ORAL at 01:02

## 2021-02-23 RX ADMIN — ENOXAPARIN SODIUM 40 MG: 40 INJECTION SUBCUTANEOUS at 06:02

## 2021-02-23 RX ADMIN — MAGNESIUM SULFATE IN WATER 2 G: 40 INJECTION, SOLUTION INTRAVENOUS at 04:02

## 2021-02-23 RX ADMIN — METHOCARBAMOL TABLETS 500 MG: 500 TABLET, COATED ORAL at 12:02

## 2021-02-23 RX ADMIN — Medication 3 ML: at 10:02

## 2021-02-23 RX ADMIN — GABAPENTIN 300 MG: 250 SOLUTION ORAL at 03:02

## 2021-02-23 RX ADMIN — POTASSIUM CHLORIDE 10 MEQ: 7.46 INJECTION, SOLUTION INTRAVENOUS at 08:02

## 2021-02-23 RX ADMIN — GABAPENTIN 300 MG: 300 CAPSULE ORAL at 08:02

## 2021-02-23 RX ADMIN — MELATONIN TAB 3 MG 6 MG: 3 TAB at 10:02

## 2021-02-23 RX ADMIN — Medication 3 ML: at 06:02

## 2021-02-23 RX ADMIN — TRAMADOL HYDROCHLORIDE 50 MG: 50 TABLET, COATED ORAL at 07:02

## 2021-02-23 RX ADMIN — MELATONIN TAB 3 MG 6 MG: 3 TAB at 01:02

## 2021-02-23 RX ADMIN — METHOCARBAMOL TABLETS 500 MG: 500 TABLET, COATED ORAL at 08:02

## 2021-02-23 RX ADMIN — PROMETHAZINE HYDROCHLORIDE 6.25 MG: 25 INJECTION INTRAMUSCULAR; INTRAVENOUS at 01:02

## 2021-02-23 RX ADMIN — POTASSIUM CHLORIDE 10 MEQ: 7.46 INJECTION, SOLUTION INTRAVENOUS at 12:02

## 2021-02-23 RX ADMIN — KETOROLAC TROMETHAMINE 15 MG: 30 INJECTION, SOLUTION INTRAMUSCULAR; INTRAVENOUS at 03:02

## 2021-02-23 RX ADMIN — POTASSIUM CHLORIDE 10 MEQ: 7.46 INJECTION, SOLUTION INTRAVENOUS at 06:02

## 2021-02-23 RX ADMIN — METHOCARBAMOL TABLETS 500 MG: 500 TABLET, COATED ORAL at 06:02

## 2021-02-23 RX ADMIN — TRAMADOL HYDROCHLORIDE 50 MG: 50 TABLET, COATED ORAL at 12:02

## 2021-02-23 RX ADMIN — IBUPROFEN 600 MG: 600 TABLET, FILM COATED ORAL at 08:02

## 2021-02-24 LAB
ANION GAP SERPL CALC-SCNC: 8 MMOL/L (ref 8–16)
BASOPHILS # BLD AUTO: 0.03 K/UL (ref 0–0.2)
BASOPHILS NFR BLD: 0.4 % (ref 0–1.9)
BUN SERPL-MCNC: 5 MG/DL (ref 6–20)
CALCIUM SERPL-MCNC: 9.2 MG/DL (ref 8.7–10.5)
CHLORIDE SERPL-SCNC: 103 MMOL/L (ref 95–110)
CO2 SERPL-SCNC: 29 MMOL/L (ref 23–29)
CREAT SERPL-MCNC: 0.7 MG/DL (ref 0.5–1.4)
CRP SERPL-MCNC: 12.7 MG/L (ref 0–8.2)
DIFFERENTIAL METHOD: ABNORMAL
EOSINOPHIL # BLD AUTO: 0.2 K/UL (ref 0–0.5)
EOSINOPHIL NFR BLD: 2.8 % (ref 0–8)
ERYTHROCYTE [DISTWIDTH] IN BLOOD BY AUTOMATED COUNT: 16 % (ref 11.5–14.5)
EST. GFR  (AFRICAN AMERICAN): >60 ML/MIN/1.73 M^2
EST. GFR  (NON AFRICAN AMERICAN): >60 ML/MIN/1.73 M^2
GLUCOSE SERPL-MCNC: 92 MG/DL (ref 70–110)
HCT VFR BLD AUTO: 34.2 % (ref 37–48.5)
HGB BLD-MCNC: 10.7 G/DL (ref 12–16)
IMM GRANULOCYTES # BLD AUTO: 0.03 K/UL (ref 0–0.04)
IMM GRANULOCYTES NFR BLD AUTO: 0.4 % (ref 0–0.5)
LYMPHOCYTES # BLD AUTO: 1.3 K/UL (ref 1–4.8)
LYMPHOCYTES NFR BLD: 15.3 % (ref 18–48)
MCH RBC QN AUTO: 26 PG (ref 27–31)
MCHC RBC AUTO-ENTMCNC: 31.3 G/DL (ref 32–36)
MCV RBC AUTO: 83 FL (ref 82–98)
MONOCYTES # BLD AUTO: 0.6 K/UL (ref 0.3–1)
MONOCYTES NFR BLD: 7.2 % (ref 4–15)
NEUTROPHILS # BLD AUTO: 6.2 K/UL (ref 1.8–7.7)
NEUTROPHILS NFR BLD: 73.9 % (ref 38–73)
NRBC BLD-RTO: 0 /100 WBC
PLATELET # BLD AUTO: 280 K/UL (ref 150–350)
PMV BLD AUTO: 11.3 FL (ref 9.2–12.9)
POTASSIUM SERPL-SCNC: 4 MMOL/L (ref 3.5–5.1)
RBC # BLD AUTO: 4.11 M/UL (ref 4–5.4)
SODIUM SERPL-SCNC: 140 MMOL/L (ref 136–145)
WBC # BLD AUTO: 8.36 K/UL (ref 3.9–12.7)

## 2021-02-24 PROCEDURE — 86140 C-REACTIVE PROTEIN: CPT

## 2021-02-24 PROCEDURE — 85025 COMPLETE CBC W/AUTO DIFF WBC: CPT

## 2021-02-24 PROCEDURE — A4216 STERILE WATER/SALINE, 10 ML: HCPCS | Performed by: STUDENT IN AN ORGANIZED HEALTH CARE EDUCATION/TRAINING PROGRAM

## 2021-02-24 PROCEDURE — 25000003 PHARM REV CODE 250: Performed by: STUDENT IN AN ORGANIZED HEALTH CARE EDUCATION/TRAINING PROGRAM

## 2021-02-24 PROCEDURE — 80048 BASIC METABOLIC PNL TOTAL CA: CPT

## 2021-02-24 PROCEDURE — 63600175 PHARM REV CODE 636 W HCPCS: Performed by: STUDENT IN AN ORGANIZED HEALTH CARE EDUCATION/TRAINING PROGRAM

## 2021-02-24 PROCEDURE — 36415 COLL VENOUS BLD VENIPUNCTURE: CPT

## 2021-02-24 PROCEDURE — 25500020 PHARM REV CODE 255: Performed by: COLON & RECTAL SURGERY

## 2021-02-24 PROCEDURE — 25500020 PHARM REV CODE 255: Performed by: STUDENT IN AN ORGANIZED HEALTH CARE EDUCATION/TRAINING PROGRAM

## 2021-02-24 PROCEDURE — 20600001 HC STEP DOWN PRIVATE ROOM

## 2021-02-24 RX ORDER — DOXYLAMINE SUCCINATE 25 MG
TABLET ORAL 2 TIMES DAILY
Status: DISCONTINUED | OUTPATIENT
Start: 2021-02-24 | End: 2021-03-05 | Stop reason: HOSPADM

## 2021-02-24 RX ORDER — ZINC OXIDE 20 G/100G
OINTMENT TOPICAL
Status: DISCONTINUED | OUTPATIENT
Start: 2021-02-24 | End: 2021-03-05 | Stop reason: HOSPADM

## 2021-02-24 RX ORDER — DEXTROSE MONOHYDRATE, SODIUM CHLORIDE, AND POTASSIUM CHLORIDE 50; 1.49; 4.5 G/1000ML; G/1000ML; G/1000ML
INJECTION, SOLUTION INTRAVENOUS CONTINUOUS
Status: DISCONTINUED | OUTPATIENT
Start: 2021-02-24 | End: 2021-02-25

## 2021-02-24 RX ORDER — SIMETHICONE 80 MG
1 TABLET,CHEWABLE ORAL 3 TIMES DAILY PRN
Status: DISCONTINUED | OUTPATIENT
Start: 2021-02-24 | End: 2021-03-05 | Stop reason: HOSPADM

## 2021-02-24 RX ADMIN — GABAPENTIN 300 MG: 300 CAPSULE ORAL at 08:02

## 2021-02-24 RX ADMIN — MICONAZOLE NITRATE: 20 CREAM TOPICAL at 09:02

## 2021-02-24 RX ADMIN — IBUPROFEN 600 MG: 600 TABLET, FILM COATED ORAL at 03:02

## 2021-02-24 RX ADMIN — IOHEXOL 15 ML: 350 INJECTION, SOLUTION INTRAVENOUS at 09:02

## 2021-02-24 RX ADMIN — POTASSIUM CHLORIDE, DEXTROSE MONOHYDRATE AND SODIUM CHLORIDE: 150; 5; 450 INJECTION, SOLUTION INTRAVENOUS at 04:02

## 2021-02-24 RX ADMIN — TRAMADOL HYDROCHLORIDE 50 MG: 50 TABLET, COATED ORAL at 02:02

## 2021-02-24 RX ADMIN — IOHEXOL 75 ML: 350 INJECTION, SOLUTION INTRAVENOUS at 11:02

## 2021-02-24 RX ADMIN — METHOCARBAMOL TABLETS 500 MG: 500 TABLET, COATED ORAL at 08:02

## 2021-02-24 RX ADMIN — METHOCARBAMOL TABLETS 500 MG: 500 TABLET, COATED ORAL at 09:02

## 2021-02-24 RX ADMIN — TRAMADOL HYDROCHLORIDE 50 MG: 50 TABLET, COATED ORAL at 09:02

## 2021-02-24 RX ADMIN — METHOCARBAMOL TABLETS 500 MG: 500 TABLET, COATED ORAL at 04:02

## 2021-02-24 RX ADMIN — Medication 3 ML: at 09:02

## 2021-02-24 RX ADMIN — GABAPENTIN 300 MG: 300 CAPSULE ORAL at 09:02

## 2021-02-24 RX ADMIN — TRAMADOL HYDROCHLORIDE 50 MG: 50 TABLET, COATED ORAL at 01:02

## 2021-02-24 RX ADMIN — METHOCARBAMOL TABLETS 500 MG: 500 TABLET, COATED ORAL at 01:02

## 2021-02-24 RX ADMIN — ENOXAPARIN SODIUM 40 MG: 40 INJECTION SUBCUTANEOUS at 04:02

## 2021-02-24 RX ADMIN — Medication 3 ML: at 06:02

## 2021-02-24 RX ADMIN — GABAPENTIN 300 MG: 300 CAPSULE ORAL at 04:02

## 2021-02-25 PROCEDURE — 25000003 PHARM REV CODE 250: Performed by: STUDENT IN AN ORGANIZED HEALTH CARE EDUCATION/TRAINING PROGRAM

## 2021-02-25 PROCEDURE — A4216 STERILE WATER/SALINE, 10 ML: HCPCS | Performed by: STUDENT IN AN ORGANIZED HEALTH CARE EDUCATION/TRAINING PROGRAM

## 2021-02-25 PROCEDURE — 20600001 HC STEP DOWN PRIVATE ROOM

## 2021-02-25 PROCEDURE — 63600175 PHARM REV CODE 636 W HCPCS: Performed by: STUDENT IN AN ORGANIZED HEALTH CARE EDUCATION/TRAINING PROGRAM

## 2021-02-25 RX ORDER — LIDOCAINE 50 MG/G
1 PATCH TOPICAL
Status: DISCONTINUED | OUTPATIENT
Start: 2021-02-25 | End: 2021-03-05 | Stop reason: HOSPADM

## 2021-02-25 RX ADMIN — Medication 3 ML: at 05:02

## 2021-02-25 RX ADMIN — ACETAMINOPHEN 650 MG: 325 TABLET ORAL at 03:02

## 2021-02-25 RX ADMIN — IBUPROFEN 600 MG: 600 TABLET, FILM COATED ORAL at 03:02

## 2021-02-25 RX ADMIN — MICONAZOLE NITRATE: 20 CREAM TOPICAL at 09:02

## 2021-02-25 RX ADMIN — TRAMADOL HYDROCHLORIDE 50 MG: 50 TABLET, COATED ORAL at 03:02

## 2021-02-25 RX ADMIN — GABAPENTIN 300 MG: 300 CAPSULE ORAL at 08:02

## 2021-02-25 RX ADMIN — TRAMADOL HYDROCHLORIDE 50 MG: 50 TABLET, COATED ORAL at 08:02

## 2021-02-25 RX ADMIN — MICONAZOLE NITRATE: 20 CREAM TOPICAL at 08:02

## 2021-02-25 RX ADMIN — LIDOCAINE 1 PATCH: 50 PATCH TOPICAL at 03:02

## 2021-02-25 RX ADMIN — ENOXAPARIN SODIUM 40 MG: 40 INJECTION SUBCUTANEOUS at 05:02

## 2021-02-25 RX ADMIN — METHOCARBAMOL TABLETS 500 MG: 500 TABLET, COATED ORAL at 05:02

## 2021-02-25 RX ADMIN — Medication 3 ML: at 03:02

## 2021-02-25 RX ADMIN — METHOCARBAMOL TABLETS 500 MG: 500 TABLET, COATED ORAL at 08:02

## 2021-02-25 RX ADMIN — METHOCARBAMOL TABLETS 500 MG: 500 TABLET, COATED ORAL at 01:02

## 2021-02-25 RX ADMIN — Medication 3 ML: at 10:02

## 2021-02-25 RX ADMIN — GABAPENTIN 300 MG: 300 CAPSULE ORAL at 03:02

## 2021-02-25 RX ADMIN — IBUPROFEN 600 MG: 600 TABLET, FILM COATED ORAL at 01:02

## 2021-02-26 LAB
FINAL PATHOLOGIC DIAGNOSIS: NORMAL
Lab: NORMAL

## 2021-02-26 PROCEDURE — A4216 STERILE WATER/SALINE, 10 ML: HCPCS | Performed by: STUDENT IN AN ORGANIZED HEALTH CARE EDUCATION/TRAINING PROGRAM

## 2021-02-26 PROCEDURE — 20600001 HC STEP DOWN PRIVATE ROOM

## 2021-02-26 PROCEDURE — 63600175 PHARM REV CODE 636 W HCPCS: Performed by: STUDENT IN AN ORGANIZED HEALTH CARE EDUCATION/TRAINING PROGRAM

## 2021-02-26 PROCEDURE — 25000003 PHARM REV CODE 250: Performed by: STUDENT IN AN ORGANIZED HEALTH CARE EDUCATION/TRAINING PROGRAM

## 2021-02-26 RX ADMIN — TRAMADOL HYDROCHLORIDE 50 MG: 50 TABLET, COATED ORAL at 01:02

## 2021-02-26 RX ADMIN — TRAMADOL HYDROCHLORIDE 50 MG: 50 TABLET, COATED ORAL at 07:02

## 2021-02-26 RX ADMIN — LIDOCAINE 1 PATCH: 50 PATCH TOPICAL at 05:02

## 2021-02-26 RX ADMIN — METHOCARBAMOL TABLETS 500 MG: 500 TABLET, COATED ORAL at 01:02

## 2021-02-26 RX ADMIN — ENOXAPARIN SODIUM 40 MG: 40 INJECTION SUBCUTANEOUS at 05:02

## 2021-02-26 RX ADMIN — TRAMADOL HYDROCHLORIDE 50 MG: 50 TABLET, COATED ORAL at 05:02

## 2021-02-26 RX ADMIN — METHOCARBAMOL TABLETS 500 MG: 500 TABLET, COATED ORAL at 05:02

## 2021-02-26 RX ADMIN — IBUPROFEN 600 MG: 600 TABLET, FILM COATED ORAL at 02:02

## 2021-02-26 RX ADMIN — METHOCARBAMOL TABLETS 500 MG: 500 TABLET, COATED ORAL at 09:02

## 2021-02-26 RX ADMIN — Medication 3 ML: at 01:02

## 2021-02-26 RX ADMIN — GABAPENTIN 300 MG: 300 CAPSULE ORAL at 08:02

## 2021-02-26 RX ADMIN — METHOCARBAMOL TABLETS 500 MG: 500 TABLET, COATED ORAL at 08:02

## 2021-02-26 RX ADMIN — GABAPENTIN 300 MG: 300 CAPSULE ORAL at 02:02

## 2021-02-26 RX ADMIN — GABAPENTIN 300 MG: 300 CAPSULE ORAL at 09:02

## 2021-02-26 RX ADMIN — ONDANSETRON 4 MG: 4 TABLET, ORALLY DISINTEGRATING ORAL at 01:02

## 2021-02-26 RX ADMIN — MICONAZOLE NITRATE: 20 CREAM TOPICAL at 08:02

## 2021-02-26 RX ADMIN — MICONAZOLE NITRATE: 20 CREAM TOPICAL at 09:02

## 2021-02-26 RX ADMIN — Medication 3 ML: at 06:02

## 2021-02-26 RX ADMIN — Medication 3 ML: at 10:02

## 2021-02-27 ENCOUNTER — ANESTHESIA EVENT (OUTPATIENT)
Dept: SURGERY | Facility: HOSPITAL | Age: 26
DRG: 390 | End: 2021-02-27
Payer: MEDICAID

## 2021-02-27 ENCOUNTER — ANESTHESIA (OUTPATIENT)
Dept: SURGERY | Facility: HOSPITAL | Age: 26
DRG: 390 | End: 2021-02-27
Payer: MEDICAID

## 2021-02-27 LAB
ANION GAP SERPL CALC-SCNC: 8 MMOL/L (ref 8–16)
BASOPHILS # BLD AUTO: 0.02 K/UL (ref 0–0.2)
BASOPHILS NFR BLD: 0.2 % (ref 0–1.9)
BUN SERPL-MCNC: 4 MG/DL (ref 6–20)
CALCIUM SERPL-MCNC: 8.7 MG/DL (ref 8.7–10.5)
CHLORIDE SERPL-SCNC: 107 MMOL/L (ref 95–110)
CO2 SERPL-SCNC: 25 MMOL/L (ref 23–29)
CREAT SERPL-MCNC: 0.7 MG/DL (ref 0.5–1.4)
CRP SERPL-MCNC: 4.2 MG/L (ref 0–8.2)
DIFFERENTIAL METHOD: ABNORMAL
EOSINOPHIL # BLD AUTO: 0 K/UL (ref 0–0.5)
EOSINOPHIL NFR BLD: 0.4 % (ref 0–8)
ERYTHROCYTE [DISTWIDTH] IN BLOOD BY AUTOMATED COUNT: 15.9 % (ref 11.5–14.5)
EST. GFR  (AFRICAN AMERICAN): >60 ML/MIN/1.73 M^2
EST. GFR  (NON AFRICAN AMERICAN): >60 ML/MIN/1.73 M^2
GLUCOSE SERPL-MCNC: 96 MG/DL (ref 70–110)
HCT VFR BLD AUTO: 34 % (ref 37–48.5)
HGB BLD-MCNC: 10.5 G/DL (ref 12–16)
IMM GRANULOCYTES # BLD AUTO: 0.03 K/UL (ref 0–0.04)
IMM GRANULOCYTES NFR BLD AUTO: 0.3 % (ref 0–0.5)
LYMPHOCYTES # BLD AUTO: 0.7 K/UL (ref 1–4.8)
LYMPHOCYTES NFR BLD: 6.9 % (ref 18–48)
MCH RBC QN AUTO: 25.9 PG (ref 27–31)
MCHC RBC AUTO-ENTMCNC: 30.9 G/DL (ref 32–36)
MCV RBC AUTO: 84 FL (ref 82–98)
MONOCYTES # BLD AUTO: 0.1 K/UL (ref 0.3–1)
MONOCYTES NFR BLD: 1 % (ref 4–15)
NEUTROPHILS # BLD AUTO: 9.1 K/UL (ref 1.8–7.7)
NEUTROPHILS NFR BLD: 91.2 % (ref 38–73)
NRBC BLD-RTO: 0 /100 WBC
PLATELET # BLD AUTO: 327 K/UL (ref 150–350)
PMV BLD AUTO: 10.6 FL (ref 9.2–12.9)
POTASSIUM SERPL-SCNC: 3.9 MMOL/L (ref 3.5–5.1)
RBC # BLD AUTO: 4.05 M/UL (ref 4–5.4)
SARS-COV-2 RDRP RESP QL NAA+PROBE: NEGATIVE
SODIUM SERPL-SCNC: 140 MMOL/L (ref 136–145)
WBC # BLD AUTO: 10.02 K/UL (ref 3.9–12.7)

## 2021-02-27 PROCEDURE — 25000003 PHARM REV CODE 250: Performed by: STUDENT IN AN ORGANIZED HEALTH CARE EDUCATION/TRAINING PROGRAM

## 2021-02-27 PROCEDURE — 85025 COMPLETE CBC W/AUTO DIFF WBC: CPT

## 2021-02-27 PROCEDURE — 63600175 PHARM REV CODE 636 W HCPCS: Performed by: STUDENT IN AN ORGANIZED HEALTH CARE EDUCATION/TRAINING PROGRAM

## 2021-02-27 PROCEDURE — 63600175 PHARM REV CODE 636 W HCPCS: Performed by: ANESTHESIOLOGY

## 2021-02-27 PROCEDURE — 94761 N-INVAS EAR/PLS OXIMETRY MLT: CPT

## 2021-02-27 PROCEDURE — 44385 ENDOSCOPY OF BOWEL POUCH: CPT | Mod: 78,51,, | Performed by: COLON & RECTAL SURGERY

## 2021-02-27 PROCEDURE — D9220A PRA ANESTHESIA: Mod: CRNA,,, | Performed by: NURSE ANESTHETIST, CERTIFIED REGISTERED

## 2021-02-27 PROCEDURE — D9220A PRA ANESTHESIA: Mod: ANES,,, | Performed by: ANESTHESIOLOGY

## 2021-02-27 PROCEDURE — 45990 SURG DX EXAM ANORECTAL: CPT | Mod: 78,,, | Performed by: COLON & RECTAL SURGERY

## 2021-02-27 PROCEDURE — A4216 STERILE WATER/SALINE, 10 ML: HCPCS | Performed by: STUDENT IN AN ORGANIZED HEALTH CARE EDUCATION/TRAINING PROGRAM

## 2021-02-27 PROCEDURE — 45990 PR SURG DIAGNOSTIC EXAM, ANORECTAL: ICD-10-PCS | Mod: 78,,, | Performed by: COLON & RECTAL SURGERY

## 2021-02-27 PROCEDURE — 37000009 HC ANESTHESIA EA ADD 15 MINS: Performed by: COLON & RECTAL SURGERY

## 2021-02-27 PROCEDURE — 37000008 HC ANESTHESIA 1ST 15 MINUTES: Performed by: COLON & RECTAL SURGERY

## 2021-02-27 PROCEDURE — C1729 CATH, DRAINAGE: HCPCS | Performed by: COLON & RECTAL SURGERY

## 2021-02-27 PROCEDURE — 36000704 HC OR TIME LEV I 1ST 15 MIN: Performed by: COLON & RECTAL SURGERY

## 2021-02-27 PROCEDURE — 63600175 PHARM REV CODE 636 W HCPCS: Performed by: NURSE ANESTHETIST, CERTIFIED REGISTERED

## 2021-02-27 PROCEDURE — 20600001 HC STEP DOWN PRIVATE ROOM

## 2021-02-27 PROCEDURE — 86140 C-REACTIVE PROTEIN: CPT

## 2021-02-27 PROCEDURE — 44385 PR ENDOSCOPY OF BOWEL POUCH: ICD-10-PCS | Mod: 78,51,, | Performed by: COLON & RECTAL SURGERY

## 2021-02-27 PROCEDURE — 25000003 PHARM REV CODE 250: Performed by: NURSE ANESTHETIST, CERTIFIED REGISTERED

## 2021-02-27 PROCEDURE — 27000221 HC OXYGEN, UP TO 24 HOURS

## 2021-02-27 PROCEDURE — 80048 BASIC METABOLIC PNL TOTAL CA: CPT

## 2021-02-27 PROCEDURE — 99900035 HC TECH TIME PER 15 MIN (STAT)

## 2021-02-27 PROCEDURE — D9220A PRA ANESTHESIA: ICD-10-PCS | Mod: ANES,,, | Performed by: ANESTHESIOLOGY

## 2021-02-27 PROCEDURE — 36415 COLL VENOUS BLD VENIPUNCTURE: CPT

## 2021-02-27 PROCEDURE — D9220A PRA ANESTHESIA: ICD-10-PCS | Mod: CRNA,,, | Performed by: NURSE ANESTHETIST, CERTIFIED REGISTERED

## 2021-02-27 PROCEDURE — 71000015 HC POSTOP RECOV 1ST HR: Performed by: COLON & RECTAL SURGERY

## 2021-02-27 PROCEDURE — 36000705 HC OR TIME LEV I EA ADD 15 MIN: Performed by: COLON & RECTAL SURGERY

## 2021-02-27 PROCEDURE — U0002 COVID-19 LAB TEST NON-CDC: HCPCS

## 2021-02-27 PROCEDURE — 71000033 HC RECOVERY, INTIAL HOUR: Performed by: COLON & RECTAL SURGERY

## 2021-02-27 RX ORDER — ROCURONIUM BROMIDE 10 MG/ML
INJECTION, SOLUTION INTRAVENOUS
Status: DISCONTINUED | OUTPATIENT
Start: 2021-02-27 | End: 2021-02-27

## 2021-02-27 RX ORDER — PHENYLEPHRINE HYDROCHLORIDE 10 MG/ML
INJECTION INTRAVENOUS
Status: DISCONTINUED | OUTPATIENT
Start: 2021-02-27 | End: 2021-02-27

## 2021-02-27 RX ORDER — HYDROMORPHONE HYDROCHLORIDE 1 MG/ML
0.2 INJECTION, SOLUTION INTRAMUSCULAR; INTRAVENOUS; SUBCUTANEOUS EVERY 5 MIN PRN
Status: DISCONTINUED | OUTPATIENT
Start: 2021-02-27 | End: 2021-02-27

## 2021-02-27 RX ORDER — MORPHINE SULFATE 2 MG/ML
INJECTION, SOLUTION INTRAMUSCULAR; INTRAVENOUS
Status: DISPENSED
Start: 2021-02-27 | End: 2021-02-27

## 2021-02-27 RX ORDER — ONDANSETRON 2 MG/ML
INJECTION INTRAMUSCULAR; INTRAVENOUS
Status: DISCONTINUED | OUTPATIENT
Start: 2021-02-27 | End: 2021-02-27

## 2021-02-27 RX ORDER — FENTANYL CITRATE 50 UG/ML
INJECTION, SOLUTION INTRAMUSCULAR; INTRAVENOUS
Status: DISCONTINUED | OUTPATIENT
Start: 2021-02-27 | End: 2021-02-27

## 2021-02-27 RX ORDER — HYDROMORPHONE HYDROCHLORIDE 1 MG/ML
INJECTION, SOLUTION INTRAMUSCULAR; INTRAVENOUS; SUBCUTANEOUS
Status: COMPLETED
Start: 2021-02-27 | End: 2021-02-27

## 2021-02-27 RX ORDER — MIDAZOLAM HYDROCHLORIDE 1 MG/ML
INJECTION, SOLUTION INTRAMUSCULAR; INTRAVENOUS
Status: DISCONTINUED | OUTPATIENT
Start: 2021-02-27 | End: 2021-02-27

## 2021-02-27 RX ORDER — DICYCLOMINE HYDROCHLORIDE 10 MG/1
10 CAPSULE ORAL 4 TIMES DAILY
Status: DISCONTINUED | OUTPATIENT
Start: 2021-02-27 | End: 2021-03-05 | Stop reason: HOSPADM

## 2021-02-27 RX ORDER — PROPOFOL 10 MG/ML
VIAL (ML) INTRAVENOUS
Status: DISCONTINUED | OUTPATIENT
Start: 2021-02-27 | End: 2021-02-27

## 2021-02-27 RX ORDER — DEXAMETHASONE SODIUM PHOSPHATE 4 MG/ML
INJECTION, SOLUTION INTRA-ARTICULAR; INTRALESIONAL; INTRAMUSCULAR; INTRAVENOUS; SOFT TISSUE
Status: DISCONTINUED | OUTPATIENT
Start: 2021-02-27 | End: 2021-02-27

## 2021-02-27 RX ORDER — HYDROMORPHONE HYDROCHLORIDE 1 MG/ML
0.2 INJECTION, SOLUTION INTRAMUSCULAR; INTRAVENOUS; SUBCUTANEOUS EVERY 5 MIN PRN
Status: DISCONTINUED | OUTPATIENT
Start: 2021-02-27 | End: 2021-03-05 | Stop reason: HOSPADM

## 2021-02-27 RX ORDER — FAMOTIDINE 10 MG/ML
INJECTION INTRAVENOUS
Status: DISCONTINUED | OUTPATIENT
Start: 2021-02-27 | End: 2021-02-27

## 2021-02-27 RX ORDER — SUCCINYLCHOLINE CHLORIDE 20 MG/ML
INJECTION INTRAMUSCULAR; INTRAVENOUS
Status: DISCONTINUED | OUTPATIENT
Start: 2021-02-27 | End: 2021-02-27

## 2021-02-27 RX ORDER — NEOSTIGMINE METHYLSULFATE 0.5 MG/ML
INJECTION, SOLUTION INTRAVENOUS
Status: DISCONTINUED | OUTPATIENT
Start: 2021-02-27 | End: 2021-02-27

## 2021-02-27 RX ORDER — MORPHINE SULFATE 2 MG/ML
4 INJECTION, SOLUTION INTRAMUSCULAR; INTRAVENOUS ONCE
Status: COMPLETED | OUTPATIENT
Start: 2021-02-27 | End: 2021-02-27

## 2021-02-27 RX ORDER — PROCHLORPERAZINE EDISYLATE 5 MG/ML
5 INJECTION INTRAMUSCULAR; INTRAVENOUS EVERY 30 MIN PRN
Status: DISCONTINUED | OUTPATIENT
Start: 2021-02-27 | End: 2021-02-27

## 2021-02-27 RX ORDER — SODIUM CHLORIDE 0.9 % (FLUSH) 0.9 %
10 SYRINGE (ML) INJECTION
Status: DISCONTINUED | OUTPATIENT
Start: 2021-02-27 | End: 2021-02-27

## 2021-02-27 RX ORDER — LIDOCAINE HYDROCHLORIDE 20 MG/ML
INJECTION INTRAVENOUS
Status: DISCONTINUED | OUTPATIENT
Start: 2021-02-27 | End: 2021-02-27

## 2021-02-27 RX ADMIN — HYDROMORPHONE HYDROCHLORIDE 0.2 MG: 1 INJECTION, SOLUTION INTRAMUSCULAR; INTRAVENOUS; SUBCUTANEOUS at 01:02

## 2021-02-27 RX ADMIN — ONDANSETRON 4 MG: 4 TABLET, ORALLY DISINTEGRATING ORAL at 02:02

## 2021-02-27 RX ADMIN — METHOCARBAMOL TABLETS 500 MG: 500 TABLET, COATED ORAL at 08:02

## 2021-02-27 RX ADMIN — MICONAZOLE NITRATE: 20 CREAM TOPICAL at 09:02

## 2021-02-27 RX ADMIN — MICONAZOLE NITRATE: 20 CREAM TOPICAL at 08:02

## 2021-02-27 RX ADMIN — BACITRACIN ZINC NEOMYCIN SULFATE POLYMYXIN B SULFATE: 400; 3.5; 5 OINTMENT TOPICAL at 08:02

## 2021-02-27 RX ADMIN — ROCURONIUM BROMIDE 5 MG: 10 INJECTION, SOLUTION INTRAVENOUS at 11:02

## 2021-02-27 RX ADMIN — MORPHINE SULFATE 4 MG: 2 INJECTION, SOLUTION INTRAMUSCULAR; INTRAVENOUS at 09:02

## 2021-02-27 RX ADMIN — GABAPENTIN 300 MG: 300 CAPSULE ORAL at 09:02

## 2021-02-27 RX ADMIN — FENTANYL CITRATE 50 MCG: 50 INJECTION, SOLUTION INTRAMUSCULAR; INTRAVENOUS at 11:02

## 2021-02-27 RX ADMIN — BACITRACIN ZINC NEOMYCIN SULFATE POLYMYXIN B SULFATE: 400; 3.5; 5 OINTMENT TOPICAL at 09:02

## 2021-02-27 RX ADMIN — PHENYLEPHRINE HYDROCHLORIDE 100 MCG: 10 INJECTION INTRAVENOUS at 12:02

## 2021-02-27 RX ADMIN — LIDOCAINE HYDROCHLORIDE 100 MG: 20 INJECTION, SOLUTION INTRAVENOUS at 11:02

## 2021-02-27 RX ADMIN — NEOSTIGMINE METHYLSULFATE 5 MG: 0.5 INJECTION INTRAVENOUS at 12:02

## 2021-02-27 RX ADMIN — Medication 3 ML: at 09:02

## 2021-02-27 RX ADMIN — GABAPENTIN 300 MG: 300 CAPSULE ORAL at 08:02

## 2021-02-27 RX ADMIN — FAMOTIDINE 20 MG: 10 INJECTION, SOLUTION INTRAVENOUS at 11:02

## 2021-02-27 RX ADMIN — MIDAZOLAM HYDROCHLORIDE 2 MG: 1 INJECTION, SOLUTION INTRAMUSCULAR; INTRAVENOUS at 11:02

## 2021-02-27 RX ADMIN — METHOCARBAMOL TABLETS 500 MG: 500 TABLET, COATED ORAL at 09:02

## 2021-02-27 RX ADMIN — TRAMADOL HYDROCHLORIDE 50 MG: 50 TABLET, COATED ORAL at 09:02

## 2021-02-27 RX ADMIN — PROPOFOL 150 MG: 10 INJECTION, EMULSION INTRAVENOUS at 11:02

## 2021-02-27 RX ADMIN — ENOXAPARIN SODIUM 40 MG: 40 INJECTION SUBCUTANEOUS at 05:02

## 2021-02-27 RX ADMIN — SUCCINYLCHOLINE CHLORIDE 100 MG: 20 INJECTION, SOLUTION INTRAMUSCULAR; INTRAVENOUS at 11:02

## 2021-02-27 RX ADMIN — Medication 3 ML: at 05:02

## 2021-02-27 RX ADMIN — DEXAMETHASONE SODIUM PHOSPHATE 4 MG: 4 INJECTION, SOLUTION INTRAMUSCULAR; INTRAVENOUS at 11:02

## 2021-02-27 RX ADMIN — PROMETHAZINE HYDROCHLORIDE 6.25 MG: 25 INJECTION INTRAMUSCULAR; INTRAVENOUS at 08:02

## 2021-02-27 RX ADMIN — METHOCARBAMOL TABLETS 500 MG: 500 TABLET, COATED ORAL at 05:02

## 2021-02-27 RX ADMIN — Medication 3 ML: at 02:02

## 2021-02-27 RX ADMIN — LIDOCAINE 1 PATCH: 50 PATCH TOPICAL at 05:02

## 2021-02-27 RX ADMIN — ROCURONIUM BROMIDE 25 MG: 10 INJECTION, SOLUTION INTRAVENOUS at 11:02

## 2021-02-27 RX ADMIN — GABAPENTIN 300 MG: 300 CAPSULE ORAL at 02:02

## 2021-02-27 RX ADMIN — SODIUM CHLORIDE: 0.9 INJECTION, SOLUTION INTRAVENOUS at 11:02

## 2021-02-27 RX ADMIN — GLYCOPYRROLATE 0.6 MG: 0.2 INJECTION, SOLUTION INTRAMUSCULAR; INTRAVITREAL at 12:02

## 2021-02-27 RX ADMIN — DICYCLOMINE HYDROCHLORIDE 10 MG: 10 CAPSULE ORAL at 08:02

## 2021-02-27 RX ADMIN — DICYCLOMINE HYDROCHLORIDE 10 MG: 10 CAPSULE ORAL at 05:02

## 2021-02-27 RX ADMIN — METHOCARBAMOL TABLETS 500 MG: 500 TABLET, COATED ORAL at 01:02

## 2021-02-27 RX ADMIN — ONDANSETRON 4 MG: 2 INJECTION, SOLUTION INTRAMUSCULAR; INTRAVENOUS at 11:02

## 2021-02-28 PROCEDURE — A4216 STERILE WATER/SALINE, 10 ML: HCPCS | Performed by: STUDENT IN AN ORGANIZED HEALTH CARE EDUCATION/TRAINING PROGRAM

## 2021-02-28 PROCEDURE — 25000003 PHARM REV CODE 250: Performed by: STUDENT IN AN ORGANIZED HEALTH CARE EDUCATION/TRAINING PROGRAM

## 2021-02-28 PROCEDURE — 63600175 PHARM REV CODE 636 W HCPCS: Performed by: STUDENT IN AN ORGANIZED HEALTH CARE EDUCATION/TRAINING PROGRAM

## 2021-02-28 PROCEDURE — 20600001 HC STEP DOWN PRIVATE ROOM

## 2021-02-28 RX ADMIN — DICYCLOMINE HYDROCHLORIDE 10 MG: 10 CAPSULE ORAL at 09:02

## 2021-02-28 RX ADMIN — GABAPENTIN 300 MG: 300 CAPSULE ORAL at 09:02

## 2021-02-28 RX ADMIN — BACITRACIN ZINC NEOMYCIN SULFATE POLYMYXIN B SULFATE: 400; 3.5; 5 OINTMENT TOPICAL at 03:02

## 2021-02-28 RX ADMIN — TRAMADOL HYDROCHLORIDE 50 MG: 50 TABLET, COATED ORAL at 03:02

## 2021-02-28 RX ADMIN — BACITRACIN ZINC NEOMYCIN SULFATE POLYMYXIN B SULFATE: 400; 3.5; 5 OINTMENT TOPICAL at 09:02

## 2021-02-28 RX ADMIN — MICONAZOLE NITRATE: 20 CREAM TOPICAL at 09:02

## 2021-02-28 RX ADMIN — METHOCARBAMOL TABLETS 500 MG: 500 TABLET, COATED ORAL at 09:02

## 2021-02-28 RX ADMIN — Medication 3 ML: at 09:02

## 2021-02-28 RX ADMIN — DICYCLOMINE HYDROCHLORIDE 10 MG: 10 CAPSULE ORAL at 12:02

## 2021-02-28 RX ADMIN — Medication 3 ML: at 05:02

## 2021-02-28 RX ADMIN — DICYCLOMINE HYDROCHLORIDE 10 MG: 10 CAPSULE ORAL at 08:02

## 2021-02-28 RX ADMIN — Medication 3 ML: at 02:02

## 2021-02-28 RX ADMIN — METHOCARBAMOL TABLETS 500 MG: 500 TABLET, COATED ORAL at 05:02

## 2021-02-28 RX ADMIN — DICYCLOMINE HYDROCHLORIDE 10 MG: 10 CAPSULE ORAL at 05:02

## 2021-02-28 RX ADMIN — LIDOCAINE 1 PATCH: 50 PATCH TOPICAL at 03:02

## 2021-02-28 RX ADMIN — METHOCARBAMOL TABLETS 500 MG: 500 TABLET, COATED ORAL at 08:02

## 2021-02-28 RX ADMIN — IBUPROFEN 600 MG: 600 TABLET, FILM COATED ORAL at 12:02

## 2021-02-28 RX ADMIN — GABAPENTIN 300 MG: 300 CAPSULE ORAL at 08:02

## 2021-02-28 RX ADMIN — GABAPENTIN 300 MG: 300 CAPSULE ORAL at 03:02

## 2021-02-28 RX ADMIN — ENOXAPARIN SODIUM 40 MG: 40 INJECTION SUBCUTANEOUS at 05:02

## 2021-02-28 RX ADMIN — METHOCARBAMOL TABLETS 500 MG: 500 TABLET, COATED ORAL at 12:02

## 2021-03-01 PROBLEM — Z93.2 ILEOSTOMY IN PLACE: Status: RESOLVED | Noted: 2021-02-18 | Resolved: 2021-03-01

## 2021-03-01 PROCEDURE — 25000003 PHARM REV CODE 250: Performed by: STUDENT IN AN ORGANIZED HEALTH CARE EDUCATION/TRAINING PROGRAM

## 2021-03-01 PROCEDURE — 63600175 PHARM REV CODE 636 W HCPCS: Performed by: STUDENT IN AN ORGANIZED HEALTH CARE EDUCATION/TRAINING PROGRAM

## 2021-03-01 PROCEDURE — A4216 STERILE WATER/SALINE, 10 ML: HCPCS | Performed by: STUDENT IN AN ORGANIZED HEALTH CARE EDUCATION/TRAINING PROGRAM

## 2021-03-01 PROCEDURE — 25500020 PHARM REV CODE 255: Performed by: COLON & RECTAL SURGERY

## 2021-03-01 PROCEDURE — 20600001 HC STEP DOWN PRIVATE ROOM

## 2021-03-01 RX ADMIN — Medication 3 ML: at 09:03

## 2021-03-01 RX ADMIN — GABAPENTIN 300 MG: 300 CAPSULE ORAL at 02:03

## 2021-03-01 RX ADMIN — GABAPENTIN 300 MG: 300 CAPSULE ORAL at 09:03

## 2021-03-01 RX ADMIN — METHOCARBAMOL TABLETS 500 MG: 500 TABLET, COATED ORAL at 09:03

## 2021-03-01 RX ADMIN — DICYCLOMINE HYDROCHLORIDE 10 MG: 10 CAPSULE ORAL at 04:03

## 2021-03-01 RX ADMIN — DICYCLOMINE HYDROCHLORIDE 10 MG: 10 CAPSULE ORAL at 09:03

## 2021-03-01 RX ADMIN — METHOCARBAMOL TABLETS 500 MG: 500 TABLET, COATED ORAL at 04:03

## 2021-03-01 RX ADMIN — Medication 3 ML: at 02:03

## 2021-03-01 RX ADMIN — LIDOCAINE 1 PATCH: 50 PATCH TOPICAL at 04:03

## 2021-03-01 RX ADMIN — ENOXAPARIN SODIUM 40 MG: 40 INJECTION SUBCUTANEOUS at 04:03

## 2021-03-01 RX ADMIN — MICONAZOLE NITRATE: 20 CREAM TOPICAL at 09:03

## 2021-03-01 RX ADMIN — IOHEXOL 100 ML: 350 INJECTION, SOLUTION INTRAVENOUS at 08:03

## 2021-03-01 RX ADMIN — BACITRACIN ZINC NEOMYCIN SULFATE POLYMYXIN B SULFATE: 400; 3.5; 5 OINTMENT TOPICAL at 09:03

## 2021-03-01 RX ADMIN — BACITRACIN ZINC NEOMYCIN SULFATE POLYMYXIN B SULFATE: 400; 3.5; 5 OINTMENT TOPICAL at 03:03

## 2021-03-01 RX ADMIN — TRAMADOL HYDROCHLORIDE 50 MG: 50 TABLET, COATED ORAL at 06:03

## 2021-03-02 PROCEDURE — 63600175 PHARM REV CODE 636 W HCPCS: Performed by: ANESTHESIOLOGY

## 2021-03-02 PROCEDURE — 20600001 HC STEP DOWN PRIVATE ROOM

## 2021-03-02 PROCEDURE — 25000003 PHARM REV CODE 250: Performed by: STUDENT IN AN ORGANIZED HEALTH CARE EDUCATION/TRAINING PROGRAM

## 2021-03-02 PROCEDURE — 63600175 PHARM REV CODE 636 W HCPCS: Performed by: STUDENT IN AN ORGANIZED HEALTH CARE EDUCATION/TRAINING PROGRAM

## 2021-03-02 PROCEDURE — A4216 STERILE WATER/SALINE, 10 ML: HCPCS | Performed by: STUDENT IN AN ORGANIZED HEALTH CARE EDUCATION/TRAINING PROGRAM

## 2021-03-02 RX ADMIN — HYDROMORPHONE HYDROCHLORIDE 0.2 MG: 1 INJECTION, SOLUTION INTRAMUSCULAR; INTRAVENOUS; SUBCUTANEOUS at 07:03

## 2021-03-02 RX ADMIN — BACITRACIN ZINC NEOMYCIN SULFATE POLYMYXIN B SULFATE: 400; 3.5; 5 OINTMENT TOPICAL at 03:03

## 2021-03-02 RX ADMIN — IBUPROFEN 600 MG: 600 TABLET, FILM COATED ORAL at 09:03

## 2021-03-02 RX ADMIN — METHOCARBAMOL TABLETS 500 MG: 500 TABLET, COATED ORAL at 05:03

## 2021-03-02 RX ADMIN — GABAPENTIN 300 MG: 300 CAPSULE ORAL at 09:03

## 2021-03-02 RX ADMIN — METHOCARBAMOL TABLETS 500 MG: 500 TABLET, COATED ORAL at 09:03

## 2021-03-02 RX ADMIN — DICYCLOMINE HYDROCHLORIDE 10 MG: 10 CAPSULE ORAL at 01:03

## 2021-03-02 RX ADMIN — Medication 3 ML: at 06:03

## 2021-03-02 RX ADMIN — BACITRACIN ZINC NEOMYCIN SULFATE POLYMYXIN B SULFATE: 400; 3.5; 5 OINTMENT TOPICAL at 08:03

## 2021-03-02 RX ADMIN — ENOXAPARIN SODIUM 40 MG: 40 INJECTION SUBCUTANEOUS at 05:03

## 2021-03-02 RX ADMIN — DICYCLOMINE HYDROCHLORIDE 10 MG: 10 CAPSULE ORAL at 09:03

## 2021-03-02 RX ADMIN — GABAPENTIN 300 MG: 300 CAPSULE ORAL at 03:03

## 2021-03-02 RX ADMIN — Medication 3 ML: at 09:03

## 2021-03-02 RX ADMIN — IBUPROFEN 600 MG: 600 TABLET, FILM COATED ORAL at 03:03

## 2021-03-02 RX ADMIN — PROMETHAZINE HYDROCHLORIDE 6.25 MG: 25 INJECTION INTRAMUSCULAR; INTRAVENOUS at 09:03

## 2021-03-02 RX ADMIN — ONDANSETRON 4 MG: 4 TABLET, ORALLY DISINTEGRATING ORAL at 07:03

## 2021-03-02 RX ADMIN — LIDOCAINE 1 PATCH: 50 PATCH TOPICAL at 03:03

## 2021-03-02 RX ADMIN — Medication 3 ML: at 02:03

## 2021-03-02 RX ADMIN — METHOCARBAMOL TABLETS 500 MG: 500 TABLET, COATED ORAL at 01:03

## 2021-03-02 RX ADMIN — BACITRACIN ZINC NEOMYCIN SULFATE POLYMYXIN B SULFATE: 400; 3.5; 5 OINTMENT TOPICAL at 09:03

## 2021-03-03 PROCEDURE — 25000003 PHARM REV CODE 250: Performed by: STUDENT IN AN ORGANIZED HEALTH CARE EDUCATION/TRAINING PROGRAM

## 2021-03-03 PROCEDURE — 63600175 PHARM REV CODE 636 W HCPCS: Performed by: STUDENT IN AN ORGANIZED HEALTH CARE EDUCATION/TRAINING PROGRAM

## 2021-03-03 PROCEDURE — A4216 STERILE WATER/SALINE, 10 ML: HCPCS | Performed by: STUDENT IN AN ORGANIZED HEALTH CARE EDUCATION/TRAINING PROGRAM

## 2021-03-03 PROCEDURE — 20600001 HC STEP DOWN PRIVATE ROOM

## 2021-03-03 RX ADMIN — METHOCARBAMOL TABLETS 500 MG: 500 TABLET, COATED ORAL at 08:03

## 2021-03-03 RX ADMIN — GABAPENTIN 300 MG: 300 CAPSULE ORAL at 08:03

## 2021-03-03 RX ADMIN — DICYCLOMINE HYDROCHLORIDE 10 MG: 10 CAPSULE ORAL at 10:03

## 2021-03-03 RX ADMIN — ENOXAPARIN SODIUM 40 MG: 40 INJECTION SUBCUTANEOUS at 05:03

## 2021-03-03 RX ADMIN — METHOCARBAMOL TABLETS 500 MG: 500 TABLET, COATED ORAL at 10:03

## 2021-03-03 RX ADMIN — GABAPENTIN 300 MG: 300 CAPSULE ORAL at 10:03

## 2021-03-03 RX ADMIN — METHOCARBAMOL TABLETS 500 MG: 500 TABLET, COATED ORAL at 05:03

## 2021-03-03 RX ADMIN — LIDOCAINE 1 PATCH: 50 PATCH TOPICAL at 05:03

## 2021-03-03 RX ADMIN — BACITRACIN ZINC NEOMYCIN SULFATE POLYMYXIN B SULFATE: 400; 3.5; 5 OINTMENT TOPICAL at 02:03

## 2021-03-03 RX ADMIN — BACITRACIN ZINC NEOMYCIN SULFATE POLYMYXIN B SULFATE: 400; 3.5; 5 OINTMENT TOPICAL at 08:03

## 2021-03-03 RX ADMIN — GABAPENTIN 300 MG: 300 CAPSULE ORAL at 03:03

## 2021-03-03 RX ADMIN — METHOCARBAMOL TABLETS 500 MG: 500 TABLET, COATED ORAL at 02:03

## 2021-03-03 RX ADMIN — DICYCLOMINE HYDROCHLORIDE 10 MG: 10 CAPSULE ORAL at 02:03

## 2021-03-03 RX ADMIN — BACITRACIN ZINC NEOMYCIN SULFATE POLYMYXIN B SULFATE: 400; 3.5; 5 OINTMENT TOPICAL at 10:03

## 2021-03-03 RX ADMIN — DICYCLOMINE HYDROCHLORIDE 10 MG: 10 CAPSULE ORAL at 08:03

## 2021-03-03 RX ADMIN — Medication 3 ML: at 02:03

## 2021-03-03 RX ADMIN — DICYCLOMINE HYDROCHLORIDE 10 MG: 10 CAPSULE ORAL at 05:03

## 2021-03-04 VITALS
HEART RATE: 72 BPM | SYSTOLIC BLOOD PRESSURE: 108 MMHG | HEIGHT: 63 IN | DIASTOLIC BLOOD PRESSURE: 65 MMHG | OXYGEN SATURATION: 100 % | BODY MASS INDEX: 20.74 KG/M2 | RESPIRATION RATE: 18 BRPM | TEMPERATURE: 98 F | WEIGHT: 117.06 LBS

## 2021-03-04 PROCEDURE — 63600175 PHARM REV CODE 636 W HCPCS: Performed by: STUDENT IN AN ORGANIZED HEALTH CARE EDUCATION/TRAINING PROGRAM

## 2021-03-04 PROCEDURE — 25000003 PHARM REV CODE 250: Performed by: STUDENT IN AN ORGANIZED HEALTH CARE EDUCATION/TRAINING PROGRAM

## 2021-03-04 PROCEDURE — A4216 STERILE WATER/SALINE, 10 ML: HCPCS | Performed by: STUDENT IN AN ORGANIZED HEALTH CARE EDUCATION/TRAINING PROGRAM

## 2021-03-04 RX ORDER — DICYCLOMINE HYDROCHLORIDE 10 MG/1
10 CAPSULE ORAL 4 TIMES DAILY
Qty: 120 CAPSULE | Refills: 6 | Status: SHIPPED | OUTPATIENT
Start: 2021-03-04 | End: 2021-04-03

## 2021-03-04 RX ORDER — OXYCODONE HYDROCHLORIDE 5 MG/1
5 TABLET ORAL EVERY 4 HOURS PRN
Qty: 20 TABLET | Refills: 0 | Status: SHIPPED | OUTPATIENT
Start: 2021-03-04 | End: 2021-06-23

## 2021-03-04 RX ADMIN — METHOCARBAMOL TABLETS 500 MG: 500 TABLET, COATED ORAL at 04:03

## 2021-03-04 RX ADMIN — METHOCARBAMOL TABLETS 500 MG: 500 TABLET, COATED ORAL at 08:03

## 2021-03-04 RX ADMIN — TRAMADOL HYDROCHLORIDE 50 MG: 50 TABLET, COATED ORAL at 06:03

## 2021-03-04 RX ADMIN — LIDOCAINE 1 PATCH: 50 PATCH TOPICAL at 04:03

## 2021-03-04 RX ADMIN — ENOXAPARIN SODIUM 40 MG: 40 INJECTION SUBCUTANEOUS at 04:03

## 2021-03-04 RX ADMIN — DICYCLOMINE HYDROCHLORIDE 10 MG: 10 CAPSULE ORAL at 02:03

## 2021-03-04 RX ADMIN — GABAPENTIN 300 MG: 300 CAPSULE ORAL at 02:03

## 2021-03-04 RX ADMIN — BACITRACIN ZINC NEOMYCIN SULFATE POLYMYXIN B SULFATE: 400; 3.5; 5 OINTMENT TOPICAL at 08:03

## 2021-03-04 RX ADMIN — IBUPROFEN 600 MG: 600 TABLET, FILM COATED ORAL at 11:03

## 2021-03-04 RX ADMIN — DICYCLOMINE HYDROCHLORIDE 10 MG: 10 CAPSULE ORAL at 08:03

## 2021-03-04 RX ADMIN — BACITRACIN ZINC NEOMYCIN SULFATE POLYMYXIN B SULFATE: 400; 3.5; 5 OINTMENT TOPICAL at 02:03

## 2021-03-04 RX ADMIN — METHOCARBAMOL TABLETS 500 MG: 500 TABLET, COATED ORAL at 02:03

## 2021-03-04 RX ADMIN — GABAPENTIN 300 MG: 300 CAPSULE ORAL at 08:03

## 2021-03-04 RX ADMIN — Medication 3 ML: at 02:03

## 2021-03-04 RX ADMIN — DICYCLOMINE HYDROCHLORIDE 10 MG: 10 CAPSULE ORAL at 04:03

## 2021-03-06 ENCOUNTER — PATIENT OUTREACH (OUTPATIENT)
Dept: ADMINISTRATIVE | Facility: CLINIC | Age: 26
End: 2021-03-06

## 2021-03-08 ENCOUNTER — PATIENT MESSAGE (OUTPATIENT)
Dept: SURGERY | Facility: CLINIC | Age: 26
End: 2021-03-08

## 2021-03-08 DIAGNOSIS — L29.0 PRURITUS ANI: ICD-10-CM

## 2021-03-08 DIAGNOSIS — L29.0 PRURITUS ANI: Primary | ICD-10-CM

## 2021-03-08 RX ORDER — FLUCONAZOLE 150 MG/1
150 TABLET ORAL DAILY
Qty: 1 TABLET | Refills: 0 | Status: SHIPPED | OUTPATIENT
Start: 2021-03-08 | End: 2021-03-08 | Stop reason: SDUPTHER

## 2021-03-08 RX ORDER — FLUCONAZOLE 150 MG/1
150 TABLET ORAL DAILY
Qty: 1 TABLET | Refills: 0 | Status: SHIPPED | OUTPATIENT
Start: 2021-03-08 | End: 2021-03-09

## 2021-03-09 ENCOUNTER — PATIENT MESSAGE (OUTPATIENT)
Dept: WOUND CARE | Facility: CLINIC | Age: 26
End: 2021-03-09

## 2021-03-16 ENCOUNTER — PATIENT MESSAGE (OUTPATIENT)
Dept: HEMATOLOGY/ONCOLOGY | Facility: CLINIC | Age: 26
End: 2021-03-16

## 2021-03-24 ENCOUNTER — OFFICE VISIT (OUTPATIENT)
Dept: SURGERY | Facility: CLINIC | Age: 26
End: 2021-03-24
Attending: COLON & RECTAL SURGERY
Payer: MEDICAID

## 2021-03-24 ENCOUNTER — LAB VISIT (OUTPATIENT)
Dept: LAB | Facility: HOSPITAL | Age: 26
End: 2021-03-24
Payer: MEDICAID

## 2021-03-24 VITALS
SYSTOLIC BLOOD PRESSURE: 120 MMHG | BODY MASS INDEX: 18.49 KG/M2 | WEIGHT: 111 LBS | DIASTOLIC BLOOD PRESSURE: 71 MMHG | HEART RATE: 101 BPM | HEIGHT: 65 IN

## 2021-03-24 DIAGNOSIS — I81 PORTAL VEIN THROMBOSIS: ICD-10-CM

## 2021-03-24 DIAGNOSIS — Z98.890 POST-OPERATIVE STATE: Primary | ICD-10-CM

## 2021-03-24 LAB
ALBUMIN SERPL BCP-MCNC: 3.9 G/DL (ref 3.5–5.2)
ALP SERPL-CCNC: 49 U/L (ref 55–135)
ALT SERPL W/O P-5'-P-CCNC: 10 U/L (ref 10–44)
ANION GAP SERPL CALC-SCNC: 8 MMOL/L (ref 8–16)
AST SERPL-CCNC: 11 U/L (ref 10–40)
BASOPHILS # BLD AUTO: 0.04 K/UL (ref 0–0.2)
BASOPHILS NFR BLD: 0.5 % (ref 0–1.9)
BILIRUB SERPL-MCNC: 0.5 MG/DL (ref 0.1–1)
BUN SERPL-MCNC: 15 MG/DL (ref 6–20)
CALCIUM SERPL-MCNC: 9 MG/DL (ref 8.7–10.5)
CHLORIDE SERPL-SCNC: 108 MMOL/L (ref 95–110)
CO2 SERPL-SCNC: 24 MMOL/L (ref 23–29)
CREAT SERPL-MCNC: 0.8 MG/DL (ref 0.5–1.4)
DIFFERENTIAL METHOD: ABNORMAL
EOSINOPHIL # BLD AUTO: 0.2 K/UL (ref 0–0.5)
EOSINOPHIL NFR BLD: 2.2 % (ref 0–8)
ERYTHROCYTE [DISTWIDTH] IN BLOOD BY AUTOMATED COUNT: 15.4 % (ref 11.5–14.5)
EST. GFR  (AFRICAN AMERICAN): >60 ML/MIN/1.73 M^2
EST. GFR  (NON AFRICAN AMERICAN): >60 ML/MIN/1.73 M^2
GLUCOSE SERPL-MCNC: 89 MG/DL (ref 70–110)
HCT VFR BLD AUTO: 36.7 % (ref 37–48.5)
HGB BLD-MCNC: 11.6 G/DL (ref 12–16)
IMM GRANULOCYTES # BLD AUTO: 0.02 K/UL (ref 0–0.04)
IMM GRANULOCYTES NFR BLD AUTO: 0.2 % (ref 0–0.5)
LYMPHOCYTES # BLD AUTO: 1.5 K/UL (ref 1–4.8)
LYMPHOCYTES NFR BLD: 17.8 % (ref 18–48)
MCH RBC QN AUTO: 26.7 PG (ref 27–31)
MCHC RBC AUTO-ENTMCNC: 31.6 G/DL (ref 32–36)
MCV RBC AUTO: 84 FL (ref 82–98)
MONOCYTES # BLD AUTO: 0.5 K/UL (ref 0.3–1)
MONOCYTES NFR BLD: 5.7 % (ref 4–15)
NEUTROPHILS # BLD AUTO: 6.1 K/UL (ref 1.8–7.7)
NEUTROPHILS NFR BLD: 73.6 % (ref 38–73)
NRBC BLD-RTO: 0 /100 WBC
PLATELET # BLD AUTO: 295 K/UL (ref 150–350)
PMV BLD AUTO: 11.5 FL (ref 9.2–12.9)
POTASSIUM SERPL-SCNC: 4.2 MMOL/L (ref 3.5–5.1)
PROT SERPL-MCNC: 7.1 G/DL (ref 6–8.4)
RBC # BLD AUTO: 4.35 M/UL (ref 4–5.4)
SODIUM SERPL-SCNC: 140 MMOL/L (ref 136–145)
WBC # BLD AUTO: 8.27 K/UL (ref 3.9–12.7)

## 2021-03-24 PROCEDURE — 80053 COMPREHEN METABOLIC PANEL: CPT | Performed by: STUDENT IN AN ORGANIZED HEALTH CARE EDUCATION/TRAINING PROGRAM

## 2021-03-24 PROCEDURE — 99999 PR PBB SHADOW E&M-EST. PATIENT-LVL III: ICD-10-PCS | Mod: PBBFAC,,, | Performed by: COLON & RECTAL SURGERY

## 2021-03-24 PROCEDURE — 99024 POSTOP FOLLOW-UP VISIT: CPT | Mod: ,,, | Performed by: COLON & RECTAL SURGERY

## 2021-03-24 PROCEDURE — 99213 OFFICE O/P EST LOW 20 MIN: CPT | Mod: PBBFAC | Performed by: COLON & RECTAL SURGERY

## 2021-03-24 PROCEDURE — 99024 PR POST-OP FOLLOW-UP VISIT: ICD-10-PCS | Mod: ,,, | Performed by: COLON & RECTAL SURGERY

## 2021-03-24 PROCEDURE — 36415 COLL VENOUS BLD VENIPUNCTURE: CPT | Performed by: STUDENT IN AN ORGANIZED HEALTH CARE EDUCATION/TRAINING PROGRAM

## 2021-03-24 PROCEDURE — 99999 PR PBB SHADOW E&M-EST. PATIENT-LVL III: CPT | Mod: PBBFAC,,, | Performed by: COLON & RECTAL SURGERY

## 2021-03-24 PROCEDURE — 85025 COMPLETE CBC W/AUTO DIFF WBC: CPT | Performed by: STUDENT IN AN ORGANIZED HEALTH CARE EDUCATION/TRAINING PROGRAM

## 2021-03-30 ENCOUNTER — OFFICE VISIT (OUTPATIENT)
Dept: HEMATOLOGY/ONCOLOGY | Facility: CLINIC | Age: 26
End: 2021-03-30
Payer: MEDICAID

## 2021-03-30 ENCOUNTER — TELEPHONE (OUTPATIENT)
Dept: HEMATOLOGY/ONCOLOGY | Facility: CLINIC | Age: 26
End: 2021-03-30

## 2021-03-30 ENCOUNTER — PATIENT MESSAGE (OUTPATIENT)
Dept: HEMATOLOGY/ONCOLOGY | Facility: CLINIC | Age: 26
End: 2021-03-30

## 2021-03-30 DIAGNOSIS — I81 PORTAL VEIN THROMBOSIS: Primary | ICD-10-CM

## 2021-03-30 DIAGNOSIS — K51.018 ULCERATIVE PANCOLITIS WITH OTHER COMPLICATION: ICD-10-CM

## 2021-03-30 PROCEDURE — 99214 PR OFFICE/OUTPT VISIT, EST, LEVL IV, 30-39 MIN: ICD-10-PCS | Mod: 95,,, | Performed by: STUDENT IN AN ORGANIZED HEALTH CARE EDUCATION/TRAINING PROGRAM

## 2021-03-30 PROCEDURE — 99214 OFFICE O/P EST MOD 30 MIN: CPT | Mod: 95,,, | Performed by: STUDENT IN AN ORGANIZED HEALTH CARE EDUCATION/TRAINING PROGRAM

## 2021-05-12 ENCOUNTER — PATIENT MESSAGE (OUTPATIENT)
Dept: RESEARCH | Facility: HOSPITAL | Age: 26
End: 2021-05-12

## 2021-05-24 PROBLEM — G89.18 POST-OP PAIN: Status: RESOLVED | Noted: 2021-02-22 | Resolved: 2021-05-24

## 2021-06-23 ENCOUNTER — OFFICE VISIT (OUTPATIENT)
Dept: SURGERY | Facility: CLINIC | Age: 26
End: 2021-06-23
Attending: COLON & RECTAL SURGERY
Payer: MEDICAID

## 2021-06-23 VITALS
SYSTOLIC BLOOD PRESSURE: 109 MMHG | DIASTOLIC BLOOD PRESSURE: 68 MMHG | WEIGHT: 112.63 LBS | HEART RATE: 98 BPM | HEIGHT: 63 IN | BODY MASS INDEX: 19.96 KG/M2

## 2021-06-23 DIAGNOSIS — K52.9 INFLAMMATORY BOWEL DISEASE: Primary | ICD-10-CM

## 2021-06-23 PROCEDURE — 99999 PR PBB SHADOW E&M-EST. PATIENT-LVL III: CPT | Mod: PBBFAC,,, | Performed by: COLON & RECTAL SURGERY

## 2021-06-23 PROCEDURE — 99213 PR OFFICE/OUTPT VISIT, EST, LEVL III, 20-29 MIN: ICD-10-PCS | Mod: S$PBB,,, | Performed by: COLON & RECTAL SURGERY

## 2021-06-23 PROCEDURE — 99999 PR PBB SHADOW E&M-EST. PATIENT-LVL III: ICD-10-PCS | Mod: PBBFAC,,, | Performed by: COLON & RECTAL SURGERY

## 2021-06-23 PROCEDURE — 99213 OFFICE O/P EST LOW 20 MIN: CPT | Mod: S$PBB,,, | Performed by: COLON & RECTAL SURGERY

## 2021-06-23 PROCEDURE — 99213 OFFICE O/P EST LOW 20 MIN: CPT | Mod: PBBFAC | Performed by: COLON & RECTAL SURGERY

## 2021-06-24 ENCOUNTER — TELEPHONE (OUTPATIENT)
Dept: SURGERY | Facility: CLINIC | Age: 26
End: 2021-06-24

## 2021-08-04 ENCOUNTER — PATIENT MESSAGE (OUTPATIENT)
Dept: SURGERY | Facility: CLINIC | Age: 26
End: 2021-08-04

## 2022-06-08 NOTE — PRE-PROCEDURE INSTRUCTIONS
PREOP INSTRUCTIONS:     NPO INSTRUCTIONS GIVEN PER [facility] DEPT.     Shower instructions as well as directions to the Day of Surgery Center were given.Patient encouraged to wear loose fitting,comfortable clothing.Medication instructions for pm prior to and am of procedure reviewed.Instructed patient to avoid taking vitamins,supplements,aspirin and ibuprofen the morning of surgery. Patient stated an understanding.Patient instructed to take temperature the night before surgery as well as the morning of surgery and to notify [facility] at [phone] if it is 100.4 or above.Patient also informed of the current visitor policy and advised patient that one visitor may accompany them into the hospital and wait (socially distanced) .When they enter the hospital both patient and visitor will have their temperature checked,provided a mask and provided assistance to their destination.      Inpatients are allowed 1 visitor/day from 8 am until 6 pm.           Patient denies any side effects or issues with anesthesia or sedation.    PT NEEDS COVID SCREEN DOS.       Patient requests all Lab, Cardiology, and Radiology Results on their Discharge Instructions

## 2022-06-16 ENCOUNTER — TELEPHONE (OUTPATIENT)
Dept: SURGERY | Facility: CLINIC | Age: 27
End: 2022-06-16
Payer: COMMERCIAL

## 2022-06-16 NOTE — PROGRESS NOTES
[facility] Office Visit Follow-up    SUBJECTIVE:     History of Present Illness:  Patient is a 27 y.o. female who presents following ileostomy reversal for UC (s/p IPAA on 11/5/2020) on 2/11/2021. Their post-operative course was complicated by ileus requiring re-admission and EUA/flex sig to rule out obstruction.   Still doing well.  No complaints today    Review of Systems:  ROS    OBJECTIVE:     Vital Signs (Most Recent)  /64 (BP Location: Right arm, Patient Position: Sitting, BP Method: Small (Automatic))   Pulse 86   Resp 18   Wt 55.8 kg (123 lb 0.3 oz)   SpO2 99%   BMI 21.79 kg/m²     Physical Exam:  General: White female in no distress   Neuro: Alert and oriented x 4.  Moves all extremities.     HEENT: No icterus.  Trachea midline  Respiratory: Respirations are even and unlabored  Cardiac: Regular rate  Abdomen: RLQ ileostomy incision well-healed.  Extremities: Warm dry and intact  Skin: No rashes      ASSESSMENT/PLAN:     25yo F s/p ileostomy reversal for UC (s/p IPAA on 11/5/2020) on 2/11/2021, doing well  RTC 1 year    Delia Mehta MD  Staff Surgeon, Colon and Rectal Surgery  [facility]

## 2022-06-17 ENCOUNTER — OFFICE VISIT (OUTPATIENT)
Dept: SURGERY | Facility: CLINIC | Age: 27
End: 2022-06-17
Attending: COLON & RECTAL SURGERY
Payer: COMMERCIAL

## 2022-06-17 VITALS
SYSTOLIC BLOOD PRESSURE: 104 MMHG | OXYGEN SATURATION: 99 % | HEART RATE: 86 BPM | BODY MASS INDEX: 21.79 KG/M2 | WEIGHT: 123 LBS | RESPIRATION RATE: 18 BRPM | DIASTOLIC BLOOD PRESSURE: 64 MMHG

## 2022-06-17 DIAGNOSIS — K52.9 INFLAMMATORY BOWEL DISEASE: Primary | ICD-10-CM

## 2022-06-17 PROCEDURE — 1159F PR MEDICATION LIST DOCUMENTED IN MEDICAL RECORD: ICD-10-PCS | Mod: CPTII,S$GLB,, | Performed by: COLON & RECTAL SURGERY

## 2022-06-17 PROCEDURE — 99999 PR PBB SHADOW E&M-EST. PATIENT-LVL III: CPT | Mod: PBBFAC,,, | Performed by: COLON & RECTAL SURGERY

## 2022-06-17 PROCEDURE — 99999 PR PBB SHADOW E&M-EST. PATIENT-LVL III: ICD-10-PCS | Mod: PBBFAC,,, | Performed by: COLON & RECTAL SURGERY

## 2022-06-17 PROCEDURE — 3078F PR MOST RECENT DIASTOLIC BLOOD PRESSURE < 80 MM HG: ICD-10-PCS | Mod: CPTII,S$GLB,, | Performed by: COLON & RECTAL SURGERY

## 2022-06-17 PROCEDURE — 1160F PR REVIEW ALL MEDS BY PRESCRIBER/CLIN PHARMACIST DOCUMENTED: ICD-10-PCS | Mod: CPTII,S$GLB,, | Performed by: COLON & RECTAL SURGERY

## 2022-06-17 PROCEDURE — 3078F DIAST BP <80 MM HG: CPT | Mod: CPTII,S$GLB,, | Performed by: COLON & RECTAL SURGERY

## 2022-06-17 PROCEDURE — 3008F BODY MASS INDEX DOCD: CPT | Mod: CPTII,S$GLB,, | Performed by: COLON & RECTAL SURGERY

## 2022-06-17 PROCEDURE — 99213 OFFICE O/P EST LOW 20 MIN: CPT | Mod: S$GLB,,, | Performed by: COLON & RECTAL SURGERY

## 2022-06-17 PROCEDURE — 1159F MED LIST DOCD IN RCRD: CPT | Mod: CPTII,S$GLB,, | Performed by: COLON & RECTAL SURGERY

## 2022-06-17 PROCEDURE — 3074F PR MOST RECENT SYSTOLIC BLOOD PRESSURE < 130 MM HG: ICD-10-PCS | Mod: CPTII,S$GLB,, | Performed by: COLON & RECTAL SURGERY

## 2022-06-17 PROCEDURE — 3074F SYST BP LT 130 MM HG: CPT | Mod: CPTII,S$GLB,, | Performed by: COLON & RECTAL SURGERY

## 2022-06-17 PROCEDURE — 3008F PR BODY MASS INDEX (BMI) DOCUMENTED: ICD-10-PCS | Mod: CPTII,S$GLB,, | Performed by: COLON & RECTAL SURGERY

## 2022-06-17 PROCEDURE — 99213 PR OFFICE/OUTPT VISIT, EST, LEVL III, 20-29 MIN: ICD-10-PCS | Mod: S$GLB,,, | Performed by: COLON & RECTAL SURGERY

## 2022-06-17 PROCEDURE — 1160F RVW MEDS BY RX/DR IN RCRD: CPT | Mod: CPTII,S$GLB,, | Performed by: COLON & RECTAL SURGERY

## 2022-06-17 PROCEDURE — 99213 OFFICE O/P EST LOW 20 MIN: CPT | Mod: PBBFAC | Performed by: COLON & RECTAL SURGERY

## 2023-01-12 ENCOUNTER — PATIENT MESSAGE (OUTPATIENT)
Dept: SURGERY | Facility: CLINIC | Age: 28
End: 2023-01-12
Payer: COMMERCIAL

## 2023-06-29 ENCOUNTER — TELEPHONE (OUTPATIENT)
Dept: SURGERY | Facility: CLINIC | Age: 28
End: 2023-06-29
Payer: COMMERCIAL

## 2023-06-29 NOTE — TELEPHONE ENCOUNTER
Spoke with pt regarding request to be seen regarding nausea and acid reflux. Pt denies seeing provider in Gastroenterology. Pt is due to see MD for 1 year f/u for jesse. Offered pt to be seen on 7/19 at 2:20 PM at [facility]. Pt verbally confirmed time and location. Denies questions.

## 2023-07-17 NOTE — PROGRESS NOTES
CRS Office Visit Follow-up    SUBJECTIVE:     History of Present Illness:  Patient is a 28 y.o. female who presents following ileostomy reversal for UC (s/p IPAA on 11/5/2020) on 2/11/2021. Their post-operative course was complicated by ileus requiring re-admission and EUA/flex sig to rule out obstruction.     Overall doing ok.  Continues to have nausea, has not been back to see local GI about this.  Having 4 -6 Bms/day.  When she takes anything sedating she will have more night-time accidents. Weight has been stable.  In a new job (still a PI). Has been trying to get pregnant for about 1 year, local Gyn has not started a fertility eval yet.    Review of Systems:  ROS    OBJECTIVE:     Vital Signs (Most Recent)  /72   Pulse 68   Wt 54.9 kg (121 lb 1.6 oz)   BMI 21.45 kg/m²     Physical Exam:  General: White female in no distress   Neuro: Alert and oriented x 4.  Moves all extremities.     HEENT: No icterus.  Trachea midline  Respiratory: Respirations are even and unlabored  Cardiac: Regular rate  Abdomen: RLQ ileostomy incision well-healed.  Extremities: Warm dry and intact  Skin: No rashes      ASSESSMENT/PLAN:     25yo F s/p ileostomy reversal for UC (s/p IPAA on 11/5/2020) on 2/11/2021, doing well overall    Labs today  GI referral, may benefit from EGD and pouchoscopy for nausea  Gyn referral for fertility eval    Delia Mehta MD  Staff Surgeon, Colon and Rectal Surgery  [facility]

## 2023-07-18 ENCOUNTER — TELEPHONE (OUTPATIENT)
Dept: SURGERY | Facility: CLINIC | Age: 28
End: 2023-07-18
Payer: COMMERCIAL

## 2023-07-19 ENCOUNTER — LAB VISIT (OUTPATIENT)
Dept: LAB | Facility: HOSPITAL | Age: 28
End: 2023-07-19
Attending: COLON & RECTAL SURGERY
Payer: COMMERCIAL

## 2023-07-19 ENCOUNTER — OFFICE VISIT (OUTPATIENT)
Dept: SURGERY | Facility: CLINIC | Age: 28
End: 2023-07-19
Attending: COLON & RECTAL SURGERY
Payer: COMMERCIAL

## 2023-07-19 VITALS
HEART RATE: 68 BPM | BODY MASS INDEX: 21.45 KG/M2 | SYSTOLIC BLOOD PRESSURE: 102 MMHG | WEIGHT: 121.13 LBS | DIASTOLIC BLOOD PRESSURE: 72 MMHG

## 2023-07-19 DIAGNOSIS — K52.9 INFLAMMATORY BOWEL DISEASE: ICD-10-CM

## 2023-07-19 DIAGNOSIS — K52.9 INFLAMMATORY BOWEL DISEASE: Primary | ICD-10-CM

## 2023-07-19 LAB
ALBUMIN SERPL BCP-MCNC: 4.2 G/DL (ref 3.5–5.2)
ALP SERPL-CCNC: 42 U/L (ref 55–135)
ALT SERPL W/O P-5'-P-CCNC: 14 U/L (ref 10–44)
ANION GAP SERPL CALC-SCNC: 12 MMOL/L (ref 8–16)
AST SERPL-CCNC: 18 U/L (ref 10–40)
BASOPHILS # BLD AUTO: 0.04 K/UL (ref 0–0.2)
BASOPHILS NFR BLD: 0.5 % (ref 0–1.9)
BILIRUB SERPL-MCNC: 0.6 MG/DL (ref 0.1–1)
BUN SERPL-MCNC: 11 MG/DL (ref 6–20)
CALCIUM SERPL-MCNC: 9.5 MG/DL (ref 8.7–10.5)
CHLORIDE SERPL-SCNC: 105 MMOL/L (ref 95–110)
CHOLEST SERPL-MCNC: 172 MG/DL (ref 120–199)
CHOLEST/HDLC SERPL: 3 {RATIO} (ref 2–5)
CO2 SERPL-SCNC: 23 MMOL/L (ref 23–29)
CREAT SERPL-MCNC: 0.7 MG/DL (ref 0.5–1.4)
DIFFERENTIAL METHOD: ABNORMAL
EOSINOPHIL # BLD AUTO: 0.1 K/UL (ref 0–0.5)
EOSINOPHIL NFR BLD: 1.1 % (ref 0–8)
ERYTHROCYTE [DISTWIDTH] IN BLOOD BY AUTOMATED COUNT: 15.4 % (ref 11.5–14.5)
EST. GFR  (NO RACE VARIABLE): >60 ML/MIN/1.73 M^2
GLUCOSE SERPL-MCNC: 76 MG/DL (ref 70–110)
HCT VFR BLD AUTO: 37.2 % (ref 37–48.5)
HDLC SERPL-MCNC: 58 MG/DL (ref 40–75)
HDLC SERPL: 33.7 % (ref 20–50)
HGB BLD-MCNC: 11.5 G/DL (ref 12–16)
IMM GRANULOCYTES # BLD AUTO: 0.02 K/UL (ref 0–0.04)
IMM GRANULOCYTES NFR BLD AUTO: 0.3 % (ref 0–0.5)
LDLC SERPL CALC-MCNC: 103.2 MG/DL (ref 63–159)
LIPASE SERPL-CCNC: 23 U/L (ref 4–60)
LYMPHOCYTES # BLD AUTO: 1.9 K/UL (ref 1–4.8)
LYMPHOCYTES NFR BLD: 25.5 % (ref 18–48)
MCH RBC QN AUTO: 26 PG (ref 27–31)
MCHC RBC AUTO-ENTMCNC: 30.9 G/DL (ref 32–36)
MCV RBC AUTO: 84 FL (ref 82–98)
MONOCYTES # BLD AUTO: 0.4 K/UL (ref 0.3–1)
MONOCYTES NFR BLD: 5.7 % (ref 4–15)
NEUTROPHILS # BLD AUTO: 4.9 K/UL (ref 1.8–7.7)
NEUTROPHILS NFR BLD: 66.9 % (ref 38–73)
NONHDLC SERPL-MCNC: 114 MG/DL
NRBC BLD-RTO: 0 /100 WBC
PLATELET # BLD AUTO: 285 K/UL (ref 150–450)
PMV BLD AUTO: 11.7 FL (ref 9.2–12.9)
POTASSIUM SERPL-SCNC: 4.2 MMOL/L (ref 3.5–5.1)
PROT SERPL-MCNC: 7.2 G/DL (ref 6–8.4)
RBC # BLD AUTO: 4.42 M/UL (ref 4–5.4)
SODIUM SERPL-SCNC: 140 MMOL/L (ref 136–145)
TRIGL SERPL-MCNC: 54 MG/DL (ref 30–150)
WBC # BLD AUTO: 7.34 K/UL (ref 3.9–12.7)

## 2023-07-19 PROCEDURE — 99213 PR OFFICE/OUTPT VISIT, EST, LEVL III, 20-29 MIN: ICD-10-PCS | Mod: S$GLB,,, | Performed by: COLON & RECTAL SURGERY

## 2023-07-19 PROCEDURE — 99999 PR PBB SHADOW E&M-EST. PATIENT-LVL III: CPT | Mod: PBBFAC,,, | Performed by: COLON & RECTAL SURGERY

## 2023-07-19 PROCEDURE — 1160F RVW MEDS BY RX/DR IN RCRD: CPT | Mod: CPTII,S$GLB,, | Performed by: COLON & RECTAL SURGERY

## 2023-07-19 PROCEDURE — 36415 COLL VENOUS BLD VENIPUNCTURE: CPT | Performed by: COLON & RECTAL SURGERY

## 2023-07-19 PROCEDURE — 1160F PR REVIEW ALL MEDS BY PRESCRIBER/CLIN PHARMACIST DOCUMENTED: ICD-10-PCS | Mod: CPTII,S$GLB,, | Performed by: COLON & RECTAL SURGERY

## 2023-07-19 PROCEDURE — 99999 PR PBB SHADOW E&M-EST. PATIENT-LVL III: ICD-10-PCS | Mod: PBBFAC,,, | Performed by: COLON & RECTAL SURGERY

## 2023-07-19 PROCEDURE — 3074F PR MOST RECENT SYSTOLIC BLOOD PRESSURE < 130 MM HG: ICD-10-PCS | Mod: CPTII,S$GLB,, | Performed by: COLON & RECTAL SURGERY

## 2023-07-19 PROCEDURE — 99213 OFFICE O/P EST LOW 20 MIN: CPT | Mod: S$GLB,,, | Performed by: COLON & RECTAL SURGERY

## 2023-07-19 PROCEDURE — 80053 COMPREHEN METABOLIC PANEL: CPT | Performed by: COLON & RECTAL SURGERY

## 2023-07-19 PROCEDURE — 3074F SYST BP LT 130 MM HG: CPT | Mod: CPTII,S$GLB,, | Performed by: COLON & RECTAL SURGERY

## 2023-07-19 PROCEDURE — 1159F MED LIST DOCD IN RCRD: CPT | Mod: CPTII,S$GLB,, | Performed by: COLON & RECTAL SURGERY

## 2023-07-19 PROCEDURE — 1159F PR MEDICATION LIST DOCUMENTED IN MEDICAL RECORD: ICD-10-PCS | Mod: CPTII,S$GLB,, | Performed by: COLON & RECTAL SURGERY

## 2023-07-19 PROCEDURE — 85025 COMPLETE CBC W/AUTO DIFF WBC: CPT | Performed by: COLON & RECTAL SURGERY

## 2023-07-19 PROCEDURE — 3008F BODY MASS INDEX DOCD: CPT | Mod: CPTII,S$GLB,, | Performed by: COLON & RECTAL SURGERY

## 2023-07-19 PROCEDURE — 3078F PR MOST RECENT DIASTOLIC BLOOD PRESSURE < 80 MM HG: ICD-10-PCS | Mod: CPTII,S$GLB,, | Performed by: COLON & RECTAL SURGERY

## 2023-07-19 PROCEDURE — 80061 LIPID PANEL: CPT | Performed by: COLON & RECTAL SURGERY

## 2023-07-19 PROCEDURE — 83690 ASSAY OF LIPASE: CPT | Performed by: COLON & RECTAL SURGERY

## 2023-07-19 PROCEDURE — 3008F PR BODY MASS INDEX (BMI) DOCUMENTED: ICD-10-PCS | Mod: CPTII,S$GLB,, | Performed by: COLON & RECTAL SURGERY

## 2023-07-19 PROCEDURE — 3078F DIAST BP <80 MM HG: CPT | Mod: CPTII,S$GLB,, | Performed by: COLON & RECTAL SURGERY

## 2023-07-21 ENCOUNTER — TELEPHONE (OUTPATIENT)
Dept: GASTROENTEROLOGY | Facility: CLINIC | Age: 28
End: 2023-07-21
Payer: COMMERCIAL

## 2023-07-21 NOTE — TELEPHONE ENCOUNTER
Called & spoke to pt  - NP process initiated   - Informed of plan for EGD/pouchoscopy now  - If findings concerning for IBD or pouchitis then plan for NP IBD appt; otherwise, plan to establish w/ general GI  - Pt agreeable to plan of care

## 2023-07-24 ENCOUNTER — TELEPHONE (OUTPATIENT)
Dept: ENDOSCOPY | Facility: HOSPITAL | Age: 28
End: 2023-07-24
Payer: COMMERCIAL

## 2023-07-24 ENCOUNTER — TELEPHONE (OUTPATIENT)
Dept: OBSTETRICS AND GYNECOLOGY | Facility: HOSPITAL | Age: 28
End: 2023-07-24
Payer: COMMERCIAL

## 2023-07-24 DIAGNOSIS — R10.9 ABDOMINAL PAIN, UNSPECIFIED ABDOMINAL LOCATION: ICD-10-CM

## 2023-07-24 DIAGNOSIS — R11.0 NAUSEA: Primary | ICD-10-CM

## 2023-07-24 NOTE — TELEPHONE ENCOUNTER
Spoke to pt to schedule procedure(s) EGD & Pouchoscopy       Physician to perform procedure(s) Dr. MARCELINO Cadena  Date of Procedure (s) 8/30/23  Arrival Time 9:35 AM  Time of Procedure(s) 10:35 AM   Location of Procedure(s) [address]  Type of Rx Prep sent to patient: Miralax  Instructions provided to patient via [facility]    Patient was informed on the following information and verbalized understanding. Screening questionnaire reviewed with patient and complete. If procedure requires anesthesia, a responsible adult needs to be present to accompany the patient home, patient cannot drive after receiving anesthesia. Appointment details are tentative, especially check-in time. Patient will receive a prep-op call 4 days prior to confirm check-in time for procedure. If applicable the patient should contact their pharmacy to verify Rx for procedure prep is ready for pick-up. Patient was advised to call the scheduling department at [phone] if pharmacy states no Rx is available. Patient was advised to call the endoscopy scheduling department if any questions or concerns arise.      [facility] Scheduling Department

## 2023-07-24 NOTE — TELEPHONE ENCOUNTER
"----- Message -----   From: Ysabel Kincaid RN   Sent: 2023   2:13 PM CDT   To: [facility]     Procedure: EGD & pouchoscopy     Diagnosis: nausea, abdominal pain, and j-pouch     Procedure Timin-12 weeks; next available     *If within 4 weeks selected, please homer as high priority*     *If greater than 12 weeks, please select "4-12 weeks" and delay sending until 2 months prior to requested date*     Provider: Dr. Lindsey or Dr. Cadena     Location: [address]     Additional Scheduling Information: No scheduling concerns     Prep Specifications:Standard prep     Have you attached a patient to this message: Yes        "

## 2023-07-24 NOTE — TELEPHONE ENCOUNTER
Please schedule patient appt with me . She is a referral from Dr Mehta for infertility .   Thanks you   AP

## 2023-08-05 NOTE — H&P
"[facility]  Colorectal Surgery  Admission H&P    Patient Name: Alissa Mitchell  MRN: [id number]  Admission Date: 11/11/2020  Hospital Length of Stay: 0 days  Attending Physician: Caden Perkins MD  Primary Care Provider: Primary Doctor No    Consults  Subjective:     Chief Complaint/Reason for Admission: Nausea, vomiting, abdominal pain    History of Present Illness: 25F with PMH medically refractory UC s/p laparoscopic total colectomy with end ileostomy (6/11/20), now s/p completion proctectomy with IPAA and diverting loop ileostomy (11/5/20). She did well in the hospital post-op and was discharged to home on 11/9/20 with Imodium BID for high ostomy output. She reports that even on the day she got home she had some abdominal pain and nausea. This worsened over the next day. Yesterday she felt like she was vomiting all throughout the day and could not take it anymore. At times she reports her symptoms were so bad that she felt like she would "black out". She did no syncopize or fall. She has had some ostomy output but it is minimal since she has not kept anything down. She was seen at an OSH and had CT imaging which showed ileus to the level of the ostomy and right portal vein thrombus. On presentation she denies fever, chills, CP, SOB, dysuria, or hematuria.      Current Facility-Administered Medications   Medication    heparin 25,000 units in dextrose 5% (100 units/ml) IV bolus from bag - ADDITIONAL PRN BOLUS - 30 units/kg    heparin 25,000 units in dextrose 5% (100 units/ml) IV bolus from bag - ADDITIONAL PRN BOLUS - 60 units/kg    heparin 25,000 units in dextrose 5% 250 mL (100 units/mL) infusion HIGH INTENSITY nomogram - [facility]     Current Outpatient Medications   Medication Sig    acetaminophen (TYLENOL) 500 MG tablet Take 2 tablets (1,000 mg total) by mouth every 8 (eight) hours.    gabapentin (NEURONTIN) 300 MG capsule Take 1 capsule (300 mg total) by mouth 3 (three) times daily.    " ibuprofen (ADVIL,MOTRIN) 800 MG tablet Take 1 tablet (800 mg total) by mouth every 8 (eight) hours.    loperamide (IMODIUM) 2 mg capsule Take 1 capsule (2 mg total) by mouth 2 (two) times a day.    ondansetron (ZOFRAN-ODT) 4 MG TbDL Take 1 tablet (4 mg total) by mouth every 6 (six) hours as needed.    oxyCODONE (ROXICODONE) 5 MG immediate release tablet Take 1 tablet (5 mg total) by mouth every 6 (six) hours as needed for Pain.       Review of patient's allergies indicates:  No Known Allergies    Past Medical History:   Diagnosis Date    ADD (attention deficit disorder)     Anxiety     Chronic anemia     Murmur     Ulcerative colitis      Past Surgical History:   Procedure Laterality Date    LAPAROSCOPIC PROCTOCOLECTOMY WITH ILEOANAL ANASTOMOSIS, CREATION OF ILEAL POUCH, AND ILEOSTOMY N/A 11/5/2020    Procedure: PROCTECTOMY, LAPAROSCOPIC, WITH ILEOANAL ANASTOMOSIS, ILEAL POUCH CREATION, AND ILEOSTOMY CREATION, ERAS low;  Surgeon: Delia Mehta MD;  Location: [facility] OR [address] [place] [address];  Service: Colon and Rectal;  Laterality: N/A;    LAPAROSCOPIC TOTAL COLECTOMY N/A 6/11/2020    Procedure: COLECTOMY, TOTAL, LAPAROSCOPIC, ERAS high and total abdominal colectomy;  Surgeon: Delia Mehta MD;  Location: [facility] OR [address] [place] [address];  Service: Colon and Rectal;  Laterality: N/A;     Family History     None        Tobacco Use    Smoking status: Never Smoker    Smokeless tobacco: Never Used   Substance and Sexual Activity    Alcohol use: Not on file    Drug use: Not on file    Sexual activity: Not on file     Review of Systems   Constitutional: Positive for activity change, appetite change and fatigue. Negative for chills and fever.   HENT: Negative for congestion and sore throat.    Eyes: Negative for pain and redness.   Respiratory: Negative for cough and shortness of breath.    Cardiovascular: Negative for chest pain and palpitations.   Gastrointestinal: Positive for abdominal pain, nausea and vomiting. Negative for  abdominal distention and blood in stool.   Genitourinary: Negative for dysuria and hematuria.   Musculoskeletal: Negative for arthralgias and myalgias.   Skin: Negative for rash and wound.   Neurological: Positive for weakness and light-headedness. Negative for dizziness and syncope.   Psychiatric/Behavioral: Negative for dysphoric mood. The patient is not nervous/anxious.      Objective:     Vital Signs (Most Recent):  Temp: 99 °F (37.2 °C) (11/11/20 0516)  Pulse: 95 (11/11/20 0507)  Resp: 14 (11/11/20 0507)  BP: 105/67 (11/11/20 0507)  SpO2: 97 % (11/11/20 0507) Vital Signs (24h Range):  Temp:  [98 °F (36.7 °C)-99 °F (37.2 °C)] 99 °F (37.2 °C)  Pulse:  [86-95] 95  Resp:  [14-18] 14  SpO2:  [97 %-98 %] 97 %  BP: (105-121)/(67-76) 105/67     Physical Exam  Constitutional:       Appearance: Normal appearance.   HENT:      Head: Normocephalic and atraumatic.      Right Ear: External ear normal.      Left Ear: External ear normal.      Nose: Nose normal.   Eyes:      Extraocular Movements: Extraocular movements intact.   Neck:      Musculoskeletal: Normal range of motion.   Cardiovascular:      Rate and Rhythm: Normal rate and regular rhythm.      Heart sounds: Normal heart sounds.   Pulmonary:      Effort: Pulmonary effort is normal. No respiratory distress.   Abdominal:      General: There is no distension.      Palpations: Abdomen is soft.      Comments: Minimal tenderness to palpation, appropriate given recent surgery, no rebound or guarding, ostomy pink/viable with small amount of liquid stool in bag, incisions c/d/i   Musculoskeletal:         General: No swelling or deformity.   Skin:     General: Skin is warm and dry.   Neurological:      General: No focal deficit present.      Mental Status: She is alert and oriented to person, place, and time.   Psychiatric:         Behavior: Behavior normal.       Significant Labs:  BMP (Last 3 Results):   Recent Labs   Lab 11/06/20  0330         K 4.1       CO2 19*   BUN 9   CREATININE 0.8   CALCIUM 7.8*   MG 1.8     CBC (Last 3 Results):   Recent Labs   Lab 11/06/20  0330   WBC 18.98*   RBC 3.61*   HGB 9.7*   HCT 31.2*      MCV 86   MCH 26.9*   MCHC 31.1*     CMP (Last 3 Results):   Recent Labs   Lab 11/06/20  0330      CALCIUM 7.8*      K 4.1   CO2 19*      BUN 9   CREATININE 0.8     CRP (Last 3 Results):   Recent Labs   Lab 11/08/20  0400 11/09/20  0420   CRP 98.9* 68.8*     Significant Diagnostics:  CT: I have reviewed all pertinent results/findings within the past 24 hours:  Portal vein thrombus of the anterior division right portal vein. Fluid-filled dilated small bowel throughout abdomen extending all the way to the RLQ ileosotmy. Consider severe ileus versus enteritis.    Assessment/Plan:     25F with PMH medically refractory UC s/p laparoscopic total colectomy with end ileostomy (6/11/20), now s/p completion proctectomy with IPAA and diverting loop ileostomy (11/5/20), discharged to home in stable condition on 11/9/20. She presents today as a transfer from an OSH with abdominal pain, nausea, and vomiting. On presentation she is afebrile with leukocytosis to 18.98. CT maging is concerning for ileus with portal vein thrombus.  -Admit to Colorectal Surgery  -Pain control  -Monitor vitals, hemodynamics  -Encourage IS  -NPO/IVF  -Nausea control  -If patient continues to have emesis, will require NGT placement for decompression  -Monitor UOP  -Continue heparin drip for therapeutic anticoagulaiton  -Will upload all imaging from OSH for radiology review  -No antibiotics at this time, trend WBC, CRP  -Discussed with Dr. Perkins and Dr. Tyson Hodge MD  Colorectal Surgery  [facility]     Specialty Care (Routine)...

## 2023-08-09 ENCOUNTER — PATIENT MESSAGE (OUTPATIENT)
Dept: OBSTETRICS AND GYNECOLOGY | Facility: CLINIC | Age: 28
End: 2023-08-09

## 2023-08-09 ENCOUNTER — OFFICE VISIT (OUTPATIENT)
Dept: OBSTETRICS AND GYNECOLOGY | Facility: CLINIC | Age: 28
End: 2023-08-09
Payer: COMMERCIAL

## 2023-08-09 ENCOUNTER — LAB VISIT (OUTPATIENT)
Dept: LAB | Facility: OTHER | Age: 28
End: 2023-08-09
Attending: OBSTETRICS & GYNECOLOGY
Payer: COMMERCIAL

## 2023-08-09 VITALS
SYSTOLIC BLOOD PRESSURE: 100 MMHG | DIASTOLIC BLOOD PRESSURE: 60 MMHG | WEIGHT: 120.38 LBS | HEIGHT: 63 IN | BODY MASS INDEX: 21.33 KG/M2

## 2023-08-09 DIAGNOSIS — N97.9 INFERTILITY, FEMALE: ICD-10-CM

## 2023-08-09 DIAGNOSIS — Z31.9 PROCREATIVE MANAGEMENT: Primary | ICD-10-CM

## 2023-08-09 LAB
DHEA-S SERPL-MCNC: 300.3 UG/DL (ref 95.8–511.7)
ESTRADIOL SERPL-MCNC: 39 PG/ML
FSH SERPL-ACNC: 5.88 MIU/ML
LH SERPL-ACNC: 2.4 MIU/ML
PROLACTIN SERPL IA-MCNC: 7.5 NG/ML (ref 5.2–26.5)
TSH SERPL DL<=0.005 MIU/L-ACNC: 0.56 UIU/ML (ref 0.4–4)

## 2023-08-09 PROCEDURE — 83520 IMMUNOASSAY QUANT NOS NONAB: CPT | Performed by: OBSTETRICS & GYNECOLOGY

## 2023-08-09 PROCEDURE — 99205 OFFICE O/P NEW HI 60 MIN: CPT | Mod: S$GLB,,, | Performed by: OBSTETRICS & GYNECOLOGY

## 2023-08-09 PROCEDURE — 99999 PR PBB SHADOW E&M-EST. PATIENT-LVL III: ICD-10-PCS | Mod: PBBFAC,,, | Performed by: OBSTETRICS & GYNECOLOGY

## 2023-08-09 PROCEDURE — 99999 PR PBB SHADOW E&M-EST. PATIENT-LVL III: CPT | Mod: PBBFAC,,, | Performed by: OBSTETRICS & GYNECOLOGY

## 2023-08-09 PROCEDURE — 83001 ASSAY OF GONADOTROPIN (FSH): CPT | Performed by: OBSTETRICS & GYNECOLOGY

## 2023-08-09 PROCEDURE — 82670 ASSAY OF TOTAL ESTRADIOL: CPT | Performed by: OBSTETRICS & GYNECOLOGY

## 2023-08-09 PROCEDURE — 82627 DEHYDROEPIANDROSTERONE: CPT | Performed by: OBSTETRICS & GYNECOLOGY

## 2023-08-09 PROCEDURE — 84146 ASSAY OF PROLACTIN: CPT | Performed by: OBSTETRICS & GYNECOLOGY

## 2023-08-09 PROCEDURE — 83002 ASSAY OF GONADOTROPIN (LH): CPT | Performed by: OBSTETRICS & GYNECOLOGY

## 2023-08-09 PROCEDURE — 84443 ASSAY THYROID STIM HORMONE: CPT | Performed by: OBSTETRICS & GYNECOLOGY

## 2023-08-09 PROCEDURE — 99205 PR OFFICE/OUTPT VISIT, NEW, LEVL V, 60-74 MIN: ICD-10-PCS | Mod: S$GLB,,, | Performed by: OBSTETRICS & GYNECOLOGY

## 2023-08-09 PROCEDURE — 36415 COLL VENOUS BLD VENIPUNCTURE: CPT | Performed by: OBSTETRICS & GYNECOLOGY

## 2023-08-09 RX ORDER — ESCITALOPRAM OXALATE 20 MG/1
20 TABLET ORAL
COMMUNITY
Start: 2023-05-25

## 2023-08-09 RX ORDER — HYDROXYZINE PAMOATE 25 MG/1
25 CAPSULE ORAL NIGHTLY PRN
COMMUNITY
Start: 2023-06-07

## 2023-08-09 RX ORDER — LISDEXAMFETAMINE DIMESYLATE 20 MG/1
20 CAPSULE ORAL EVERY MORNING
COMMUNITY
Start: 2023-05-25

## 2023-08-09 NOTE — PROGRESS NOTES
CC: fertility     Alissa Mitchell is a 28 y.o. female  presents for a fertility consult from Dr Cuevas.  LMP   She has history of ileostomy for UC in   She is seeing Dr Cuevas   She had a blood clot in the vein above the liver   She has cycles that are every month or so  She is using LH kits to check for ovulation  She believes she is ovulating based on LH kits/ BBT  SA is normal    History of Presacral pelvic collection and a right pelvic sidewall collection worrisome for loculated ascites or abscess in     Reviewed her chart, scans,  CT scans and her surgeries    Past Medical History:   Diagnosis Date    ADD (attention deficit disorder)     Anxiety     Blood clot associated with vein wall inflammation     above liver, on eliquis x 6mo started in november after Jpouch creation    Chronic anemia     Murmur     Ulcerative colitis        Past Surgical History:   Procedure Laterality Date    CLOSURE, ILEOSTOMY, LOOP N/A 2021    Procedure: CLOSURE, ILEOSTOMY, LOOP, ERAS low;  Surgeon: Delia Mehta MD;  [facility];  Service: Colon and Rectal;  Laterality: N/A;    EXAMINATION UNDER ANESTHESIA N/A 12/15/2020    Procedure: Exam under anesthesia;  Surgeon: Delia Mehta MD;  [facility];  Service: Endoscopy;  Laterality: N/A;    EXAMINATION UNDER ANESTHESIA N/A 2021    Procedure: Exam under anesthesia;  Surgeon: Delia Mehta MD;  Location: [facility] OR [facility] FLR;  Service: Endoscopy;  Laterality: N/A;    FLEXIBLE SIGMOIDOSCOPY N/A 12/15/2020    Procedure: SIGMOIDOSCOPY, FLEXIBLE;  Surgeon: Delia Mehta MD;  Location: [facility] OR 2ND FLR;  Service: Endoscopy;  Laterality: N/A;    FLEXIBLE SIGMOIDOSCOPY N/A 2021    Procedure: SIGMOIDOSCOPY, FLEXIBLE;  Surgeon: Delia Mehta MD;  [facility];  Service: Endoscopy;  Laterality: N/A;    LAPAROSCOPIC PROCTOCOLECTOMY WITH ILEOANAL ANASTOMOSIS, CREATION OF ILEAL POUCH, AND  "ILEOSTOMY N/A 2020    Procedure: PROCTECTOMY, LAPAROSCOPIC, WITH ILEOANAL ANASTOMOSIS, ILEAL POUCH CREATION, AND ILEOSTOMY CREATION, ERAS low;  Surgeon: Delia Mehta MD;  [facility];  Service: Colon and Rectal;  Laterality: N/A;    LAPAROSCOPIC TOTAL COLECTOMY N/A 2020    Procedure: COLECTOMY, TOTAL, LAPAROSCOPIC, ERAS high and total abdominal colectomy;  Surgeon: Delia Mehta MD;  Location: [facility] OR [facility] FLR;  Service: Colon and Rectal;  Laterality: N/A;       OB History    Para Term  AB Living   1 1 1     1   SAB IAB Ectopic Multiple Live Births           1      # Outcome Date GA Lbr Fawad/2nd Weight Sex Delivery Anes PTL Lv   1 Term 13 40w1d  2.722 kg (6 lb) F Vag-Spont EPI  CHARLI      Complications: Cord around neck with compression, Fever       Family History   Problem Relation Age of Onset    Breast cancer Neg Hx     Colon cancer Neg Hx     Ovarian cancer Neg Hx        Social History     Tobacco Use    Smoking status: Never    Smokeless tobacco: Never   Substance Use Topics    Alcohol use: Never    Drug use: Never       /60   Ht 5' 3" (1.6 m)   Wt 54.6 kg (120 lb 5.9 oz)   LMP 2023   BMI 21.32 kg/m²     ROS:  GENERAL: Denies weight gain or weight loss. Feeling well overall.   SKIN: Denies rash or lesions.   HEAD: Denies head injury or headache.   NODES: Denies enlarged lymph nodes.   CHEST: Denies chest pain or shortness of breath.   CARDIOVASCULAR: Denies palpitations or left sided chest pain.   ABDOMEN: No abdominal pain, constipation, diarrhea, nausea, vomiting or rectal bleeding.   URINARY: No frequency, dysuria, hematuria, or burning on urination.  REPRODUCTIVE: See HPI.   BREASTS: The patient performs breast self-examination and denies pain, lumps, or nipple discharge.   HEMATOLOGIC: No easy bruisability or excessive bleeding.  MUSCULOSKELETAL: Denies joint pain or swelling.   NEUROLOGIC: Denies syncope or weakness.   PSYCHIATRIC: " Denies depression, anxiety or mood swings.    Physical Exam:    APPEARANCE: Well nourished, well developed, in no acute distress.  AFFECT: WNL, alert and oriented x 3  SKIN: No acne or hirsutism  NECK: Neck symmetric without masses or thyromegaly  Virtual visit      ASSESSMENT AND PLAN  1. Procreative management        2. Infertility, female  ANTIMULLERIAN HORMONE ([facility])    TSH    FOLLICLE STIMULATING HORMONE    LUTEINIZING HORMONE    Estradiol    PROLACTIN    DHEA-SULFATE         SA  Consider possible HSG and evaluation for tubal disease  Discussed concerns with inflammatory response in the pelvis      Inositol  Fish Oil  Vitamin D Supplements for PCOS  N-Acetyl-Cysteine (NAC)  Carnitine Supplements for PCOS  Magnesium  Probiotics  Zinc     B Vitamin Supplements for PCOS  Low inflammatory diet      Patient was counseled today on A.C.S. Pap guidelines and recommendations for yearly pelvic exams, mammograms and monthly self breast exams; to see her PCP for other health maintenance.   F/U with as needed   Consider prometrium with her cycles

## 2023-08-10 ENCOUNTER — PATIENT MESSAGE (OUTPATIENT)
Dept: OBSTETRICS AND GYNECOLOGY | Facility: CLINIC | Age: 28
End: 2023-08-10
Payer: COMMERCIAL

## 2023-08-11 LAB — MIS SERPL-MCNC: 1.7 NG/ML (ref 0.89–9.9)

## 2023-08-17 ENCOUNTER — OFFICE VISIT (OUTPATIENT)
Dept: OBSTETRICS AND GYNECOLOGY | Facility: CLINIC | Age: 28
End: 2023-08-17
Payer: COMMERCIAL

## 2023-08-17 DIAGNOSIS — Z31.9 PROCREATIVE MANAGEMENT: Primary | ICD-10-CM

## 2023-08-17 DIAGNOSIS — Z98.890 H/O PELVIC SURGERY: ICD-10-CM

## 2023-08-17 DIAGNOSIS — K65.1: ICD-10-CM

## 2023-08-17 PROCEDURE — 99213 PR OFFICE/OUTPT VISIT, EST, LEVL III, 20-29 MIN: ICD-10-PCS | Mod: 95,,, | Performed by: OBSTETRICS & GYNECOLOGY

## 2023-08-17 PROCEDURE — 99213 OFFICE O/P EST LOW 20 MIN: CPT | Mod: 95,,, | Performed by: OBSTETRICS & GYNECOLOGY

## 2023-08-17 NOTE — PROGRESS NOTES
The patient location is: home    The chief complaint leading to consultation is: fertility    Visit type: audiovisual    Face to Face time with patient: 10   minutes of total time spent on the encounter, which includes face to face time and non-face to face time preparing to see the patient (eg, review of tests), Obtaining and/or reviewing separately obtained history, Documenting clinical information in the electronic or other health record, Independently interpreting results (not separately reported) and communicating results to the patient/family/caregiver, or Care coordination (not separately reported).         Each patient to whom he or she provides medical services by telemedicine is:  (1) informed of the relationship between the physician and patient and the respective role of any other health care provider with respect to management of the patient; and (2) notified that he or she may decline to receive medical services by telemedicine and may withdraw from such care at any time.    Notes:     Alissa Mitchell is a 28 y.o. female  presents for review of her labs and follow up care with fertility   She did not speak with Dr Mary and spoke Fertility Dr Kerr in [place].  She would like to speak with Dr Mary.  AMH 1.7  Normal FSH, prolactin TSH etc.         Past Medical History:   Diagnosis Date    ADD (attention deficit disorder)     Anxiety     Blood clot associated with vein wall inflammation     above liver, on eliquis x 6mo started in november after Jpouch creation    Chronic anemia     Murmur     Ulcerative colitis        Past Surgical History:   Procedure Laterality Date    CLOSURE, ILEOSTOMY, LOOP N/A 2021    Procedure: CLOSURE, ILEOSTOMY, LOOP, ERAS low;  Surgeon: Delia Mehta MD;  Location: [facility] OR [address] [place] [address];  Service: Colon and Rectal;  Laterality: N/A;    EXAMINATION UNDER ANESTHESIA N/A 12/15/2020    Procedure: Exam under anesthesia;  Surgeon: Delia Mehta MD;   Location: [facility] OR 2ND FLR;  Service: Endoscopy;  Laterality: N/A;    EXAMINATION UNDER ANESTHESIA N/A 2021    Procedure: Exam under anesthesia;  Surgeon: Delia Mehta MD;  Location: [facility] OR 2ND FLR;  Service: Endoscopy;  Laterality: N/A;    FLEXIBLE SIGMOIDOSCOPY N/A 12/15/2020    Procedure: SIGMOIDOSCOPY, FLEXIBLE;  Surgeon: Delia Mehta MD;  Location: [facility] OR [facility] FLR;  Service: Endoscopy;  Laterality: N/A;    FLEXIBLE SIGMOIDOSCOPY N/A 2021    Procedure: SIGMOIDOSCOPY, FLEXIBLE;  Surgeon: Delia Mehta MD;  Location: [facility] OR [facility] FLR;  Service: Endoscopy;  Laterality: N/A;    LAPAROSCOPIC PROCTOCOLECTOMY WITH ILEOANAL ANASTOMOSIS, CREATION OF ILEAL POUCH, AND ILEOSTOMY N/A 2020    Procedure: PROCTECTOMY, LAPAROSCOPIC, WITH ILEOANAL ANASTOMOSIS, ILEAL POUCH CREATION, AND ILEOSTOMY CREATION, ERAS low;  Surgeon: Delia Mehta MD;  [facility];  Service: Colon and Rectal;  Laterality: N/A;    LAPAROSCOPIC TOTAL COLECTOMY N/A 2020    Procedure: COLECTOMY, TOTAL, LAPAROSCOPIC, ERAS high and total abdominal colectomy;  Surgeon: Delia Mehta MD;  [facility];  Service: Colon and Rectal;  Laterality: N/A;       OB History    Para Term  AB Living   1 1 1     1   SAB IAB Ectopic Multiple Live Births           1      # Outcome Date GA Lbr Fawad/2nd Weight Sex Delivery Anes PTL Lv   1 Term 13 40w1d  2.722 kg (6 lb) F Vag-Spont EPI  CHARLI      Complications: Cord around neck with compression, Fever       Family History   Problem Relation Age of Onset    Breast cancer Neg Hx     Colon cancer Neg Hx     Ovarian cancer Neg Hx        Social History     Tobacco Use    Smoking status: Never    Smokeless tobacco: Never   Substance Use Topics    Alcohol use: Never    Drug use: Never       LMP 2023     ROS:  GENERAL: Denies weight gain or weight loss. Feeling well overall.   SKIN: Denies rash or lesions.   HEAD: Denies head injury or  headache.   NODES: Denies enlarged lymph nodes.   CHEST: Denies chest pain or shortness of breath.   CARDIOVASCULAR: Denies palpitations or left sided chest pain.   ABDOMEN: No abdominal pain, constipation, diarrhea, nausea, vomiting or rectal bleeding.   URINARY: No frequency, dysuria, hematuria, or burning on urination.  REPRODUCTIVE: See HPI.   BREASTS: The patient performs breast self-examination and denies pain, lumps, or nipple discharge.   HEMATOLOGIC: No easy bruisability or excessive bleeding.  MUSCULOSKELETAL: Denies joint pain or swelling.   NEUROLOGIC: Denies syncope or weakness.   PSYCHIATRIC: Denies depression, anxiety or mood swings.    Physical Exam:    APPEARANCE: Well nourished, well developed, in no acute distress.  Virtual      ASSESSMENT AND PLAN  1. Procreative management        2. H/O pelvic surgery        3. Abscess, peritoneal            Patient was counseled today on need for HSG to assess the patency of the fallopian tubes  Discussed normal labs.  Reviewed normal SA  Will follow up with Dr Mary and will recommend a full evaluation for fertility

## 2023-08-18 ENCOUNTER — TELEPHONE (OUTPATIENT)
Dept: OBSTETRICS AND GYNECOLOGY | Facility: CLINIC | Age: 28
End: 2023-08-18
Payer: COMMERCIAL

## 2023-08-18 DIAGNOSIS — N73.6 PELVIC ADHESIVE DISEASE: Primary | ICD-10-CM

## 2023-08-30 ENCOUNTER — HOSPITAL ENCOUNTER (OUTPATIENT)
Facility: HOSPITAL | Age: 28
Discharge: HOME | End: 2023-08-30
Attending: INTERNAL MEDICINE | Admitting: INTERNAL MEDICINE
Payer: COMMERCIAL

## 2023-08-30 ENCOUNTER — ANESTHESIA (OUTPATIENT)
Dept: ENDOSCOPY | Facility: HOSPITAL | Age: 28
End: 2023-08-30
Payer: COMMERCIAL

## 2023-08-30 ENCOUNTER — ANESTHESIA EVENT (OUTPATIENT)
Dept: ENDOSCOPY | Facility: HOSPITAL | Age: 28
End: 2023-08-30
Payer: COMMERCIAL

## 2023-08-30 VITALS
WEIGHT: 122 LBS | RESPIRATION RATE: 20 BRPM | SYSTOLIC BLOOD PRESSURE: 120 MMHG | TEMPERATURE: 98 F | HEART RATE: 60 BPM | OXYGEN SATURATION: 100 % | DIASTOLIC BLOOD PRESSURE: 61 MMHG | BODY MASS INDEX: 22.45 KG/M2 | HEIGHT: 62 IN

## 2023-08-30 DIAGNOSIS — R11.0 NAUSEA: ICD-10-CM

## 2023-08-30 LAB
B-HCG UR QL: NEGATIVE
CTP QC/QA: YES

## 2023-08-30 PROCEDURE — 43239 EGD BIOPSY SINGLE/MULTIPLE: CPT | Performed by: INTERNAL MEDICINE

## 2023-08-30 PROCEDURE — E9220 PRA ENDO ANESTHESIA: ICD-10-PCS | Mod: 33,,, | Performed by: NURSE ANESTHETIST, CERTIFIED REGISTERED

## 2023-08-30 PROCEDURE — 88342 IMHCHEM/IMCYTCHM 1ST ANTB: CPT | Mod: 26,,, | Performed by: PATHOLOGY

## 2023-08-30 PROCEDURE — 88305 TISSUE EXAM BY PATHOLOGIST: CPT | Mod: 59 | Performed by: PATHOLOGY

## 2023-08-30 PROCEDURE — 88305 TISSUE EXAM BY PATHOLOGIST: ICD-10-PCS | Mod: 26,,, | Performed by: PATHOLOGY

## 2023-08-30 PROCEDURE — 88305 TISSUE EXAM BY PATHOLOGIST: CPT | Mod: 26,,, | Performed by: PATHOLOGY

## 2023-08-30 PROCEDURE — 43239 PR EGD, FLEX, W/BIOPSY, SGL/MULTI: ICD-10-PCS | Mod: 51,,, | Performed by: INTERNAL MEDICINE

## 2023-08-30 PROCEDURE — 37000008 HC ANESTHESIA 1ST 15 MINUTES: Performed by: INTERNAL MEDICINE

## 2023-08-30 PROCEDURE — 88342 IMHCHEM/IMCYTCHM 1ST ANTB: CPT | Performed by: PATHOLOGY

## 2023-08-30 PROCEDURE — 63600175 PHARM REV CODE 636 W HCPCS: Performed by: NURSE ANESTHETIST, CERTIFIED REGISTERED

## 2023-08-30 PROCEDURE — E9220 PRA ENDO ANESTHESIA: HCPCS | Mod: 33,,, | Performed by: NURSE ANESTHETIST, CERTIFIED REGISTERED

## 2023-08-30 PROCEDURE — 37000009 HC ANESTHESIA EA ADD 15 MINS: Performed by: INTERNAL MEDICINE

## 2023-08-30 PROCEDURE — 81025 URINE PREGNANCY TEST: CPT | Performed by: INTERNAL MEDICINE

## 2023-08-30 PROCEDURE — 44386 PR ENDOSCOPY, BOWEL POUCH, BIOPSY: ICD-10-PCS | Mod: 33,,, | Performed by: INTERNAL MEDICINE

## 2023-08-30 PROCEDURE — 25000003 PHARM REV CODE 250: Performed by: NURSE ANESTHETIST, CERTIFIED REGISTERED

## 2023-08-30 PROCEDURE — 44386 ENDOSCOPY BOWEL POUCH/BIOP: CPT | Mod: PT | Performed by: INTERNAL MEDICINE

## 2023-08-30 PROCEDURE — 88342 CHG IMMUNOCYTOCHEMISTRY: ICD-10-PCS | Mod: 26,,, | Performed by: PATHOLOGY

## 2023-08-30 PROCEDURE — 43239 EGD BIOPSY SINGLE/MULTIPLE: CPT | Mod: 51,,, | Performed by: INTERNAL MEDICINE

## 2023-08-30 PROCEDURE — 44386 ENDOSCOPY BOWEL POUCH/BIOP: CPT | Mod: 33,,, | Performed by: INTERNAL MEDICINE

## 2023-08-30 PROCEDURE — 27201012 HC FORCEPS, HOT/COLD, DISP: Performed by: INTERNAL MEDICINE

## 2023-08-30 RX ORDER — LIDOCAINE HYDROCHLORIDE 20 MG/ML
INJECTION INTRAVENOUS
Status: DISCONTINUED | OUTPATIENT
Start: 2023-08-30 | End: 2023-08-30

## 2023-08-30 RX ORDER — SODIUM CHLORIDE 9 MG/ML
INJECTION, SOLUTION INTRAVENOUS CONTINUOUS
Status: DISCONTINUED | OUTPATIENT
Start: 2023-08-30 | End: 2023-08-30 | Stop reason: HOSPADM

## 2023-08-30 RX ORDER — PROPOFOL 10 MG/ML
VIAL (ML) INTRAVENOUS
Status: DISCONTINUED | OUTPATIENT
Start: 2023-08-30 | End: 2023-08-30

## 2023-08-30 RX ADMIN — PROPOFOL 175 MCG/KG/MIN: 10 INJECTION, EMULSION INTRAVENOUS at 10:08

## 2023-08-30 RX ADMIN — PROPOFOL 60 MG: 10 INJECTION, EMULSION INTRAVENOUS at 10:08

## 2023-08-30 RX ADMIN — LIDOCAINE HYDROCHLORIDE 80 MG: 20 INJECTION INTRAVENOUS at 10:08

## 2023-08-30 RX ADMIN — SODIUM CHLORIDE: 0.9 INJECTION, SOLUTION INTRAVENOUS at 09:08

## 2023-08-30 NOTE — ANESTHESIA POSTPROCEDURE EVALUATION
Anesthesia Post Evaluation    Patient: Alissa Mitchell    Procedure(s) Performed: Procedure(s) (LRB):  EGD (ESOPHAGOGASTRODUODENOSCOPY) (N/A)  ENDOSCOPY, POUCH, SMALL INTESTINE, DIAGNOSTIC (N/A)    Final Anesthesia Type: general      Patient location during evaluation: PACU  Patient participation: Yes- Able to Participate  Level of consciousness: awake and alert and oriented  Post-procedure vital signs: reviewed and stable  Pain management: adequate  Airway patency: patent    PONV status at discharge: No PONV  Anesthetic complications: no      Cardiovascular status: blood pressure returned to baseline  Respiratory status: unassisted, room air and spontaneous ventilation  Hydration status: euvolemic  Follow-up not needed.          Vitals Value Taken Time   /61 08/30/23 1051   Temp 36.4 °C (97.5 °F) 08/30/23 1051   Pulse 63 08/30/23 1051   Resp 16 08/30/23 1051   SpO2 100 % 08/30/23 1051         No case tracking events are documented in the log.      Pain/Christy Score: Christy Score: 9 (8/30/2023 10:52 AM)

## 2023-08-30 NOTE — ANESTHESIA PREPROCEDURE EVALUATION
08/30/2023  Alissa Mitchell is a 28 y.o., female.      Pre-op Assessment    I have reviewed the Patient Summary Reports.     I have reviewed the Nursing Notes. I have reviewed the NPO Status.   I have reviewed the Medications.     Review of Systems  Anesthesia Hx:  No problems with previous Anesthesia  History of prior surgery of interest to airway management or planning: Denies Family Hx of Anesthesia complications.   Denies Personal Hx of Anesthesia complications.   Hematology/Oncology:  Hematology Normal   Oncology Normal     EENT/Dental:EENT/Dental Normal   Cardiovascular:  Cardiovascular Normal Exercise tolerance: good     Pulmonary:  Pulmonary Normal    Renal/:  Renal/ Normal     Hepatic/GI:   PUD,    Musculoskeletal:  Musculoskeletal Normal    Neurological:  Neurology Normal    Endocrine:  Endocrine Normal    Dermatological:  Skin Normal    Psych:   anxiety          Physical Exam  General: Well nourished, Cooperative, Alert and Oriented    Airway:  Mallampati: II   Mouth Opening: Normal  TM Distance: Normal  Tongue: Normal  Neck ROM: Normal ROM    Dental:  Intact        Anesthesia Plan  Type of Anesthesia, risks & benefits discussed:    Anesthesia Type: Gen Natural Airway  Intra-op Monitoring Plan: Standard ASA Monitors  Induction:  IV  Informed Consent: Informed consent signed with the Patient and all parties understand the risks and agree with anesthesia plan.  All questions answered.   ASA Score: 2  Day of Surgery Review of History & Physical: H&P Update referred to the surgeon/provider.    Ready For Surgery From Anesthesia Perspective.     .

## 2023-08-30 NOTE — TRANSFER OF CARE
"Anesthesia Transfer of Care Note    Patient: Alissa Mitchell    Procedure(s) Performed: Procedure(s) (LRB):  EGD (ESOPHAGOGASTRODUODENOSCOPY) (N/A)  ENDOSCOPY, POUCH, SMALL INTESTINE, DIAGNOSTIC (N/A)    Patient location: PACU    Anesthesia Type: general    Transport from OR: Transported from OR on room air with adequate spontaneous ventilation    Post pain: adequate analgesia    Post assessment: no apparent anesthetic complications and tolerated procedure well    Post vital signs: stable    Level of consciousness: awake, alert and oriented    Nausea/Vomiting: no nausea/vomiting    Complications: none    Transfer of care protocol was followed      Last vitals:   Visit Vitals  /65(BP Location: Left arm, Patient Position: Lying)   Pulse 81   Temp 36.7 °C (98.1 °F) (Temporal)   Resp 16   Ht 5' 2" (1.575 m)   Wt 55.3 kg (122 lb)   SpO2 98%   Breastfeeding No   BMI 22.31 kg/m²     "

## 2023-08-30 NOTE — H&P
Short Stay Endoscopy History and Physical    PCP - No, Primary Doctor    Procedure - EGD and pouchoscopy  ASA - 2  Mallampati - per anesthesia  History of Anesthesia problems - no  Family history Anesthesia problems -  no     HPI:  This is a 28 y.o. female here for evaluation of :     EGD  Reflux - no  Dysphagia - no  Abdominal pain - no  Diarrhea - no  Anemia - no  GI bleeding - no  Other - nausea    Colonoscopy  Screening - no  History of polyps - no  Diarrhea - no  Anemia - no  Blood in stools - no  Abdominal pain - no  Other - surveillance for dysplasia    ROS:  CONSTITUTIONAL: Denies weight change,  fatigue, fevers, chills, night sweats.  CARDIOVASCULAR: Denies chest pain, shortness of breath, orthopnea and edema.  RESPIRATORY: Denies cough, hemoptysis, dyspnea, and wheezing.  GI: See HPI.    Medical History:   Past Medical History:   Diagnosis Date    ADD (attention deficit disorder)     Anxiety     Blood clot associated with vein wall inflammation     above liver, on eliquis x 6mo started in november after Jpouch creation    Chronic anemia     Murmur     Ulcerative colitis        Surgical History:   Past Surgical History:   Procedure Laterality Date    CLOSURE, ILEOSTOMY, LOOP N/A 2/18/2021    Procedure: CLOSURE, ILEOSTOMY, LOOP, ERAS low;  Surgeon: Delia Mehta MD;  Location: [address];  Service: Colon and Rectal;  Laterality: N/A;    EXAMINATION UNDER ANESTHESIA N/A 12/15/2020    Procedure: Exam under anesthesia;  Surgeon: Delia Mehta MD;  Location: [address];  Service: Endoscopy;  Laterality: N/A;    EXAMINATION UNDER ANESTHESIA N/A 2/27/2021    Procedure: Exam under anesthesia;  Surgeon: Delia Mehta MD;  Location: [address];  Service: Endoscopy;  Laterality: N/A;    FLEXIBLE SIGMOIDOSCOPY N/A 12/15/2020    Procedure: SIGMOIDOSCOPY, FLEXIBLE;  Surgeon: Delia Mehta MD;  Location: [address];  Service: Endoscopy;  Laterality: N/A;    FLEXIBLE  SIGMOIDOSCOPY N/A 2/27/2021    Procedure: SIGMOIDOSCOPY, FLEXIBLE;  Surgeon: Delia Mehta MD;  [facility];  Service: Endoscopy;  Laterality: N/A;    LAPAROSCOPIC PROCTOCOLECTOMY WITH ILEOANAL ANASTOMOSIS, CREATION OF ILEAL POUCH, AND ILEOSTOMY N/A 11/5/2020    Procedure: PROCTECTOMY, LAPAROSCOPIC, WITH ILEOANAL ANASTOMOSIS, ILEAL POUCH CREATION, AND ILEOSTOMY CREATION, ERAS low;  Surgeon: Delia Mehta MD;  Location: [facility] OR 2ND FLR;  Service: Colon and Rectal;  Laterality: N/A;    LAPAROSCOPIC TOTAL COLECTOMY N/A 6/11/2020    Procedure: COLECTOMY, TOTAL, LAPAROSCOPIC, ERAS high and total abdominal colectomy;  Surgeon: Delia Mehta MD;  Location: [facility] OR 2ND FLR;  Service: Colon and Rectal;  Laterality: N/A;       Family History:   Family History   Problem Relation Age of Onset    Breast cancer Neg Hx     Colon cancer Neg Hx     Ovarian cancer Neg Hx        Social History:   Social History     Tobacco Use    Smoking status: Never    Smokeless tobacco: Never   Substance Use Topics    Alcohol use: Never    Drug use: Never       Allergies: Reviewed    Medications:   Current Facility-Administered Medications on File Prior to Encounter   Medication Dose Route Frequency Provider Last Rate Last Admin    0.9%  NaCl infusion   Intravenous Continuous Anjana Dillard NP 70 mL/hr at 12/15/20 0950 70 mL/hr at 12/15/20 0950    mupirocin 2 % ointment   Nasal On Call Procedure Anjana Dillard NP   Given at 12/15/20 1010     No current outpatient medications on file prior to encounter.       Physical Exam:  Vital Signs:   Vitals:    08/30/23 0925   BP: 123/76   Pulse: 81   Resp: 16   Temp: 98.1 °F (36.7 °C)     General Appearance: Well appearing in no acute distress  ENT: OP clear  Chest: CTA B  CV: RRR, no m/r/g  Abd: s/nt/nd/nabs  Ext: no edema    Labs:Reviewed    Plan:   I have explained the risks and benefits of upper endoscopy and pouchoscopy to the patient including but not  limited to bleeding, perforation, infection, and death. The patient wishes to proceed.

## 2023-08-30 NOTE — [employer] PATIENT INSTRUCTIONS
Discharge Summary/Instructions after an Endoscopic Procedure  Patient Name: Alissa Mitchell  Patient MRN: [id number]  Patient YOB: 1995 Wednesday, August 30, 2023  Isidro Cadena MD  Dear patient,  As a result of recent federal legislation (The Federal Cures Act), you may   receive lab or pathology results from your procedure in your [facility]   account before your physician is able to contact you. Your physician or   their representative will relay the results to you with their   recommendations at their soonest availability.  Thank you,  RESTRICTIONS:  During your procedure today, you received medications for sedation.  These   medications may affect your judgment, balance and coordination.  Therefore,   for 24 hours, you have the following restrictions:   - DO NOT drive a car, operate machinery, make legal/financial decisions,   sign important papers or drink alcohol.    ACTIVITY:  Today: no heavy lifting, straining or running due to procedural   sedation/anesthesia.  The following day: return to full activity including work.  DIET:  Eat and drink normally unless instructed otherwise.     TREATMENT FOR COMMON SIDE EFFECTS:  - Mild abdominal pain, nausea, belching, bloating or excessive gas:  rest,   eat lightly and use a heating pad.  - Sore Throat: treat with throat lozenges and/or gargle with warm salt   water.  - Because air was used during the procedure, expelling large amounts of air   from your rectum or belching is normal.  - If a bowel prep was taken, you may not have a bowel movement for 1-3 days.    This is normal.  SYMPTOMS TO WATCH FOR AND REPORT TO YOUR PHYSICIAN:  1. Abdominal pain or bloating, other than gas cramps.  2. Chest pain.  3. Back pain.  4. Signs of infection such as: chills or fever occurring within 24 hours   after the procedure.  5. Rectal bleeding, which would show as bright red, maroon, or black stools.   (A tablespoon of blood from the rectum is not serious,  especially if   hemorrhoids are present.)  6. Vomiting.  7. Weakness or dizziness.  GO DIRECTLY TO THE NEAREST EMERGENCY ROOM IF YOU HAVE ANY OF THE FOLLOWING:      Difficulty breathing              Chills and/or fever over 101 F   Persistent vomiting and/or vomiting blood   Severe abdominal pain   Severe chest pain   Black, tarry stools   Bleeding- more than one tablespoon   Any other symptom or condition that you feel may need urgent attention  Your doctor recommends these additional instructions:  If any biopsies were taken, your doctors clinic will contact you in 1 to 2   weeks with any results.  - Discharge patient to home.   - Use Prilosec (omeprazole) 20 mg PO daily.   - Await pathology results.   - Return to referring physician as previously scheduled.   - Will obtain an upper GI series in the future to evaluate the abnormal   stomach anatomy and the duodenal dilation.  For questions, problems or results please call your physician - Isidro Cadena MD at Work:  [phone].  [facility], EMERGENCY ROOM PHONE NUMBER: [phone]  IF A COMPLICATION OR EMERGENCY SITUATION ARISES AND YOU ARE UNABLE TO REACH   YOUR PHYSICIAN - GO DIRECTLY TO THE EMERGENCY ROOM.  Isidro Cadena MD  8/30/2023 10:32:25 AM  This report has been verified and signed electronically.  Dear patient,  As a result of recent federal legislation (The Federal Cures Act), you may   receive lab or pathology results from your procedure in your [facility]   account before your physician is able to contact you. Your physician or   their representative will relay the results to you with their   recommendations at their soonest availability.  Thank you,  PROVATION

## 2023-08-30 NOTE — [employer]ATION PATIENT INSTRUCTIONS
Discharge Summary/Instructions after an Endoscopic Procedure  Patient Name: Alissa Mitchell  Patient MRN: [id number]  Patient YOB: 1995 Wednesday, August 30, 2023  Isidro Cadena MD  Dear patient,  As a result of recent federal legislation (The Federal Cures Act), you may   receive lab or pathology results from your procedure in your [facility]   account before your physician is able to contact you. Your physician or   their representative will relay the results to you with their   recommendations at their soonest availability.  Thank you,  RESTRICTIONS:  During your procedure today, you received medications for sedation.  These   medications may affect your judgment, balance and coordination.  Therefore,   for 24 hours, you have the following restrictions:   - DO NOT drive a car, operate machinery, make legal/financial decisions,   sign important papers or drink alcohol.    ACTIVITY:  Today: no heavy lifting, straining or running due to procedural   sedation/anesthesia.  The following day: return to full activity including work.  DIET:  Eat and drink normally unless instructed otherwise.     TREATMENT FOR COMMON SIDE EFFECTS:  - Mild abdominal pain, nausea, belching, bloating or excessive gas:  rest,   eat lightly and use a heating pad.  - Sore Throat: treat with throat lozenges and/or gargle with warm salt   water.  - Because air was used during the procedure, expelling large amounts of air   from your rectum or belching is normal.  - If a bowel prep was taken, you may not have a bowel movement for 1-3 days.    This is normal.  SYMPTOMS TO WATCH FOR AND REPORT TO YOUR PHYSICIAN:  1. Abdominal pain or bloating, other than gas cramps.  2. Chest pain.  3. Back pain.  4. Signs of infection such as: chills or fever occurring within 24 hours   after the procedure.  5. Rectal bleeding, which would show as bright red, maroon, or black stools.   (A tablespoon of blood from the rectum is not serious,  especially if   hemorrhoids are present.)  6. Vomiting.  7. Weakness or dizziness.  GO DIRECTLY TO THE NEAREST EMERGENCY ROOM IF YOU HAVE ANY OF THE FOLLOWING:      Difficulty breathing              Chills and/or fever over 101 F   Persistent vomiting and/or vomiting blood   Severe abdominal pain   Severe chest pain   Black, tarry stools   Bleeding- more than one tablespoon   Any other symptom or condition that you feel may need urgent attention  Your doctor recommends these additional instructions:  If any biopsies were taken, your doctors clinic will contact you in 1 to 2   weeks with any results.  - Discharge patient to home.   - Resume previous diet today.   - Continue present medications.   - Await pathology results.   - Repeat post-surgical lower GI endoscopy in 2 years for surveillance.  For questions, problems or results please call your physician - Isidro Cadena MD at Work:  [phone].  [facility], EMERGENCY ROOM PHONE NUMBER: [phone]  IF A COMPLICATION OR EMERGENCY SITUATION ARISES AND YOU ARE UNABLE TO REACH   YOUR PHYSICIAN - GO DIRECTLY TO THE EMERGENCY ROOM.  Isidro Cadena MD  8/30/2023 10:51:04 AM  This report has been verified and signed electronically.  Dear patient,  As a result of recent federal legislation (The Federal Cures Act), you may   receive lab or pathology results from your procedure in your [facility]   account before your physician is able to contact you. Your physician or   their representative will relay the results to you with their   recommendations at their soonest availability.  Thank you,  [employer]ATION

## 2023-08-31 ENCOUNTER — PATIENT MESSAGE (OUTPATIENT)
Dept: GASTROENTEROLOGY | Facility: CLINIC | Age: 28
End: 2023-08-31
Payer: COMMERCIAL

## 2023-08-31 DIAGNOSIS — K51.018 ULCERATIVE PANCOLITIS WITH OTHER COMPLICATION: Primary | ICD-10-CM

## 2023-09-01 NOTE — TELEPHONE ENCOUNTER
Spoke to Dr. Cadena  -reassured patient d/t biopsies from the pouch that she is expected to have some bleeding   - will check CBC today just to monitor levels  -pt to get drawn at [facility] will send the lab slips through portal

## 2023-09-02 LAB
BASOPHILS # BLD AUTO: 0.1 X10E3/UL (ref 0–0.2)
BASOPHILS NFR BLD AUTO: 1 %
EOSINOPHIL # BLD AUTO: 0.2 X10E3/UL (ref 0–0.4)
EOSINOPHIL NFR BLD AUTO: 2 %
ERYTHROCYTE [DISTWIDTH] IN BLOOD BY AUTOMATED COUNT: 15.4 % (ref 11.7–15.4)
HCT VFR BLD AUTO: 33.8 % (ref 34–46.6)
HGB BLD-MCNC: 10.7 G/DL (ref 11.1–15.9)
IMM GRANULOCYTES NFR BLD AUTO: 0 %
LYMPHOCYTES # BLD AUTO: 2.5 X10E3/UL (ref 0.7–3.1)
LYMPHOCYTES NFR BLD AUTO: 33 %
MCH RBC QN AUTO: 26.8 PG (ref 26.6–33)
MCHC RBC AUTO-ENTMCNC: 31.7 G/DL (ref 31.5–35.7)
MCV RBC AUTO: 85 FL (ref 79–97)
MONOCYTES # BLD AUTO: 0.6 X10E3/UL (ref 0.1–0.9)
MONOCYTES NFR BLD AUTO: 8 %
NEUTROPHILS # BLD AUTO: 4.2 X10E3/UL (ref 1.4–7)
NEUTROPHILS NFR BLD AUTO: 56 %
PLATELET # BLD AUTO: 259 X10E3/UL (ref 150–450)
RBC # BLD AUTO: 3.99 X10E6/UL (ref 3.77–5.28)
WBC # BLD AUTO: 7.5 X10E3/UL (ref 3.4–10.8)

## 2023-09-06 LAB
FINAL PATHOLOGIC DIAGNOSIS: NORMAL
GROSS: NORMAL
Lab: NORMAL

## 2023-09-14 DIAGNOSIS — K51.018 ULCERATIVE PANCOLITIS WITH OTHER COMPLICATION: Primary | ICD-10-CM

## 2023-09-14 RX ORDER — ONDANSETRON 4 MG/1
4 TABLET, ORALLY DISINTEGRATING ORAL EVERY 8 HOURS PRN
Qty: 90 TABLET | Refills: 0 | Status: SHIPPED | OUTPATIENT
Start: 2023-09-14

## 2023-11-28 ENCOUNTER — TELEPHONE (OUTPATIENT)
Dept: GASTROENTEROLOGY | Facility: CLINIC | Age: 28
End: 2023-11-28
Payer: COMMERCIAL

## 2023-11-28 ENCOUNTER — OFFICE VISIT (OUTPATIENT)
Dept: GASTROENTEROLOGY | Facility: CLINIC | Age: 28
End: 2023-11-28
Payer: MEDICAID

## 2023-11-28 DIAGNOSIS — D50.0 IRON DEFICIENCY ANEMIA DUE TO CHRONIC BLOOD LOSS: ICD-10-CM

## 2023-11-28 DIAGNOSIS — K51.018 ULCERATIVE PANCOLITIS WITH OTHER COMPLICATION: Primary | ICD-10-CM

## 2023-11-28 DIAGNOSIS — B37.2 CANDIDAL INTERTRIGO: ICD-10-CM

## 2023-11-28 PROCEDURE — 99214 OFFICE O/P EST MOD 30 MIN: CPT | Mod: 95,,, | Performed by: INTERNAL MEDICINE

## 2023-11-28 PROCEDURE — 99214 PR OFFICE/OUTPT VISIT, EST, LEVL IV, 30-39 MIN: ICD-10-PCS | Mod: 95,,, | Performed by: INTERNAL MEDICINE

## 2023-11-28 RX ORDER — FLUCONAZOLE 200 MG/1
200 TABLET ORAL DAILY
Qty: 14 TABLET | Refills: 0 | Status: SHIPPED | OUTPATIENT
Start: 2023-11-28 | End: 2023-12-28

## 2023-11-28 NOTE — PATIENT INSTRUCTIONS
Try fluconazole for yeast infection  Get updated labs  IV iron if iron deficient   Follow-up in 6 months

## 2023-11-29 NOTE — PROGRESS NOTES
[facility]          Inflammatory Bowel Disease New Patient Consultation Note         TODAY'S VISIT DATE:  11/29/2023    Reason for Consult:    Chief Complaint   Patient presents with    Ulcerative Colitis       PCP: No, Primary Doctor      Referring MD:   No ref. provider found    History of Present Illness:  Alissa Mitchell who is a 28 y.o. female is being seen today at the [facility] on 11/29/2023 for inflammatory bowel disease- ulcerative colitis.  She is here today for her 1st office visit with me.  I met her recently when she had endoscopic procedures performed.  These were done because of significant nausea issues as well as increasing stool frequency.  There were no significant findings noted on these exams to explain her symptoms.  After the procedures she started focused on her diet and she noticed that she has a lot of issues with lactose.  With discontinuing lactose her nausea resolved completely.  She also noted that bloating, abdominal cramping, and stool frequency improved as well.  Today she reports that she is been having increasing candidal intertrigo in the perianal area.  It occurs about every 2-3 days and will last for 1-4 days.  She is using a topical over-the-counter antifungal cream that seems to help.  Her stool output is stable.  She does report significant fatigue issues.  Her last labs did show some anemia.    IBD History:  She was diagnosed with ulcerative colitis in 2013.  It appears that she had primarily left-sided disease extending up to the splenic flexure.  She was treated with multiple medications including 5 ASA, azathioprine without improvement.  She had multiple courses of steroids that would help but she was unable to taper off them.  She was treated with infliximab with unclear response but this was discontinued because of lack of insurance coverage.  She was also treated with Xeljanz that was not  effective.  She underwent a total colectomy in June of 2020.  In November of 2020 she underwent completion proctectomy with IPAA creation.  Following this surgery she presented with abdominal pain was diagnosed with portal vein thrombosis and was treated with Eliquis.  In January 2021 a CT scan showed resolution of the portal vein thrombosis but did show a small fluid collection posterior to her J pouch that was drained by Interventional Radiology.  In February 2021 she underwent ileostomy takedown.  She did well clinically but in early 2023 she presented with significant nausea complaints and increasing stool frequency.  An EGD was unrevealing.  A pouchoscopy showed some ulceration along the staple line but no other significant inflammation was noted in the pouch or pre pouch ileum.  Biopsies did show some rectal cuff inflammation as well as some inflammation in the J pouch.    IBD Details:  Dx Date:  2013-left-sided ulcerative colitis  Disease type/distribution:  Ulcerative colitis-rectum to splenic flexure involvement  Current Treatment:  None Start Date:  Response:   Optimized:   Adverse reactions:   Prior surgeries:   June 2020-total colectomy with end ileostomy     November 2020-completion proctectomy with IPAA creation     February 2021-ileostomy takedown  CRP Elevation: Y   calprotectin: Unknown  Disease Complications:  Refractory disease  Extraintestinal manifestations:  Iron deficiency anemia  Prior treatments:   Steroids:  Good response to prednisone  5ASA:  Not effective  IMM:  Not effective  TNF Inh:  Infliximab-unclear response   Anti-Integrin:  None   IL 12/23:  None  MYRON Inh:  Xeljanz-not effective    Previous Clinical Trials:  None    Last Colonoscopy:  August [id number]-slkuthsijl along the staple line but no other significant overt inflammation noted    Other Endoscopies:  August [id number] normal    Imaging:   MRE:  None   CT:  Previous reports reviewed   Other:  No other  "pertinent imaging    Pertinent Labs:  Lab Results   Component Value Date    CRP 4.2 02/27/2021     No results found for: "TTGIGA", "IGA"  Lab Results   Component Value Date    TSH 0.555 08/09/2023     No results found for: "JLMIISSR45LI", "IXSOLTQQ38"  Lab Results   Component Value Date    HEPCAB Negative 02/22/2021     Lab Results   Component Value Date    TKJ38FSSU Negative 02/22/2021     No results found for: "NIL", "TBAG", "TBAGNIL", "MITOGENNIL", "TBGOLD", "TSPOTSCREN"  No results found for: "TPTMINTERP", "TPMTRESULT"  No results found for: "STOOLCULTURE", "KOFJRFPHSP9H", "BZQFSANTMM0E", "CDIFFICILEAN", "CDIFFTOX", "CDIFFICILEBY"  No results found for: "CALPROTECTIN"    Therapeutic Drug Monitoring Labs:  No results found for: "PROMETH"  No results found for: "ANSADAINIT", "INFLIXIMAB", "INFLIXINTERP"    Vaccinations:  No results found for: "HEPBSAB"  No results found for: "HEPAIGG"  No results found for: "VARICELLAZOS", "VARICELLAINT"  There is no immunization history for the selected administration types on file for this patient.      Review of Systems  Review of Systems   Constitutional:  Positive for malaise/fatigue. Negative for chills, fever and weight loss.   HENT:  Negative for sore throat.    Eyes:  Negative for pain, discharge and redness.   Respiratory:  Negative for cough, shortness of breath and wheezing.    Cardiovascular:  Negative for chest pain, orthopnea and leg swelling.   Gastrointestinal:  Positive for diarrhea. Negative for abdominal pain, blood in stool, constipation, heartburn, melena, nausea and vomiting.   Genitourinary:  Negative for dysuria, frequency and urgency.   Musculoskeletal:  Negative for back pain, joint pain and myalgias.   Skin:  Negative for itching and rash.   Neurological:  Negative for focal weakness and seizures.   Endo/Heme/Allergies:  Does not bruise/bleed easily.   Psychiatric/Behavioral:  Negative for depression. The patient is not nervous/anxious.        Medical " History:   Past Medical History:   Diagnosis Date    ADD (attention deficit disorder)     Anxiety     Blood clot associated with vein wall inflammation     above liver, on eliquis x 6mo started in november after Jpouch creation    Chronic anemia     Murmur     Ulcerative colitis        Surgical History:  Past Surgical History:   Procedure Laterality Date    CLOSURE, ILEOSTOMY, LOOP N/A 2/18/2021    Procedure: CLOSURE, ILEOSTOMY, LOOP, ERAS low;  Surgeon: Delia Mehta MD;  [facility];  Service: Colon and Rectal;  Laterality: N/A;    ESOPHAGOGASTRODUODENOSCOPY N/A 8/30/2023    Procedure: EGD (ESOPHAGOGASTRODUODENOSCOPY);  Surgeon: Isidro Cadena MD;  Location: [facility] (4TH FLR);  Service: Endoscopy;  Laterality: N/A;    EXAMINATION UNDER ANESTHESIA N/A 12/15/2020    Procedure: Exam under anesthesia;  Surgeon: Delia Mehta MD;  Location: [facility] OR 2ND FLR;  Service: Endoscopy;  Laterality: N/A;    EXAMINATION UNDER ANESTHESIA N/A 2/27/2021    Procedure: Exam under anesthesia;  Surgeon: Deila Mehta MD;  [facility];  Service: Endoscopy;  Laterality: N/A;    FLEXIBLE SIGMOIDOSCOPY N/A 12/15/2020    Procedure: SIGMOIDOSCOPY, FLEXIBLE;  Surgeon: Delia Mehta MD;  Location: [facility] OR 2ND FLR;  Service: Endoscopy;  Laterality: N/A;    FLEXIBLE SIGMOIDOSCOPY N/A 2/27/2021    Procedure: SIGMOIDOSCOPY, FLEXIBLE;  Surgeon: Delia Mehta MD;  [facility];  Service: Endoscopy;  Laterality: N/A;    LAPAROSCOPIC PROCTOCOLECTOMY WITH ILEOANAL ANASTOMOSIS, CREATION OF ILEAL POUCH, AND ILEOSTOMY N/A 11/5/2020    Procedure: PROCTECTOMY, LAPAROSCOPIC, WITH ILEOANAL ANASTOMOSIS, ILEAL POUCH CREATION, AND ILEOSTOMY CREATION, ERAS low;  Surgeon: Delia Mehta MD;  Location: [facility] OR 2ND FLR;  Service: Colon and Rectal;  Laterality: N/A;    LAPAROSCOPIC TOTAL COLECTOMY N/A 6/11/2020    Procedure: COLECTOMY, TOTAL, LAPAROSCOPIC, ERAS high and total  abdominal colectomy;  Surgeon: Delia Mehta MD;  Location: [facility] OR 2ND FLR;  Service: Colon and Rectal;  Laterality: N/A;    POUCHOSCOPY N/A 8/30/2023    Procedure: ENDOSCOPY, POUCH, SMALL INTESTINE, DIAGNOSTIC;  Surgeon: Isidro Cadena MD;  Location: [facility] ENDO (4TH FLR);  Service: Endoscopy;  Laterality: N/A;  inst via portal       Family History:   Family History   Problem Relation Age of Onset    Breast cancer Neg Hx     Colon cancer Neg Hx     Ovarian cancer Neg Hx        Social History:   Social History     Tobacco Use    Smoking status: Never    Smokeless tobacco: Never   Substance Use Topics    Alcohol use: Never    Drug use: Never       Allergies: Reviewed    Home Medications:   Medication List with Changes/Refills   New Medications    FLUCONAZOLE (DIFLUCAN) 200 MG TAB    Take 1 tablet (200 mg total) by mouth once daily.   Current Medications    ESCITALOPRAM OXALATE (LEXAPRO) 20 MG TABLET    Take 20 mg by mouth.    HYDROXYZINE PAMOATE (VISTARIL) 25 MG CAP    Take 25 mg by mouth nightly as needed.    ONDANSETRON (ZOFRAN-ODT) 4 MG TBDL    Take 1 tablet (4 mg total) by mouth every 8 (eight) hours as needed.    VYVANSE 20 MG CAPSULE    Take 20 mg by mouth every morning.       Physical Exam:  Vital Signs:  There were no vitals taken for this visit.  There is no height or weight on file to calculate BMI.    Physical Exam  Nursing note reviewed.   Constitutional:       General: She is not in acute distress.     Appearance: Normal appearance. She is well-developed. She is not ill-appearing.   Skin:     Findings: No rash.   Neurological:      Mental Status: She is alert.   Psychiatric:         Mood and Affect: Mood normal.         Behavior: Behavior normal.         Thought Content: Thought content normal.         Judgment: Judgment normal.         Labs: reviewed and pertinent noted above    Assessment/Plan:  Alissa Mitchell is a 28 y.o. female with ulcerative colitis status post proctocolectomy  with J pouch. The following issues were addresssed:    1. Ulcerative pancolitis with other complication    2. Iron deficiency anemia due to chronic blood loss    3. Candidal intertrigo      1. J pouch ulceration:  Likely related to mild ischemia.  The remainder of the pouch and pre pouch ileum looked normal.  No evidence of Crohn's like disease or pouchitis.  She did have some mild cuffitis but denies any urgency or symptoms associated with this so no treatment warranted.      2. Iron deficiency anemia:  Previously had iron deficiency.  Recent labs showed some anemia.  We will recheck iron studies and if iron deficient we will arrange for IV iron infusions.  Likely the cause of her fatigue.      3. Candida:  Prescription for fluconazole sent to her pharmacy to see if that helps take care of these problems.         # IBD specific health maintenance:  Colon cancer surveillance:  Up-to-date    Annual:  - Eye exam:  Not applicable  - Skin exam (if on IMM/TNF):  Not applicable  - reminded pt to use sunblock/hats/sunprotective clothing  - PAP (if immunosuppressed):  Review in the future    DEXA:  Not applicable    Vitamin D:  Check in the future    Vaccines:    Influenza:  Recommend annual vaccination   Pneumovax:  Review in the future   HAV:  Check serology in the future   HBV:  Vaccinated-Check serology in the future   Tdap:  Up-to-date   MMR:  Vaccinated-Check serologies in the future   VZV:  Vaccinated-Check serology in the future   HZV:  Not applicable   HPV:  Vaccinated   Meningococcus:  Review in the future   COVID: Review in the future    Follow up: Follow up in about 6 months (around 5/28/2024) for Virtual Visit.    Thank you again for sending Alissa Mitchell to see Dr. Alexandru Cadena today at the [facility]. Please don't hesitate to contact Dr. Cadena if there are any questions regarding this evaluation, or if you have any other patients with inflammatory bowel disease for  whom you would like a consultation. You can reach Dr. Cadena at [phone] or by email at [email or web address]    Isidro Cadena MD  Department of Gastroenterology  Inflammatory Bowel Disease

## 2024-01-30 NOTE — NURSING TRANSFER
Nursing Transfer Note      11/6/2020     Transfer To: 1061    Transfer via bed    Transported by PCT x2    Medicines sent: IVF    Chart send with patient: Yes    Notified: Significant other     Patient reassessed at: 1300    Personal belongings transferred to room with patient.    Cardiac Surgery Progress Note    Day of Surgery: 1/29/24  CABG X 3 w/ LIMA pedicle to LAD, GSV to Diag1 to Diag2   Right endoscopic vein harvest    Subjective: Examined. Pt is resting in bed and sating well on nasal cannula at 2 L/min s/p bypass yesterday.    Objective:  Physical Exam:  Physical Exam  Constitutional:       General: He is not in acute distress.     Interventions: Nasal cannula in place.   Cardiovascular:      Rate and Rhythm: Normal rate and regular rhythm.   Abdominal:      Palpations: Abdomen is soft.   Musculoskeletal:         General: Normal range of motion.   Skin:     General: Skin is warm and dry.   Neurological:      Mental Status: He is alert.   Psychiatric:         Mood and Affect: Mood normal.            Hemodynamics  PAP (mmHg): (15-32)/(6-20) 28/20  CVP:  [7 mmHg-15 mmHg] 13 mmHg  PCWP:  [11 mmHg-20 mmHg] 15 mmHg  CO:  [6.2 l/min-9.1 l/min] 6.2 l/min  CI:  [3.1 l/min/m2-4.5 l/min/m2] 3.1 l/min/m2     Vitals, labs, and imaging documented over the past 24 hours have been independently reviewed.    Assessment    Patient is a 44 year old male with a past medical history of CAD s/p NSTEMI who is now s/p CABG x3 w/ LIMA on 1/29/24.    Plan    - Administer albumin therapy.  - Starting Lasix 40 mg IVP Q12H.  - Pulmonary toilet/IS use.    Charting performed by yojana Cruz for Dr. Bassett.    All medical record entries made by the scribe were at my direction. I have reviewed the chart and agree that the record accurately reflects my personal performance of the history, physical exam, hospital course, and assessment and plan.

## 2024-02-03 LAB
ALBUMIN SERPL-MCNC: 4.6 G/DL (ref 4–5)
ALBUMIN/GLOB SERPL: 1.9 {RATIO} (ref 1.2–2.2)
ALP SERPL-CCNC: 45 IU/L (ref 44–121)
ALT SERPL-CCNC: 13 IU/L (ref 0–32)
AST SERPL-CCNC: 16 IU/L (ref 0–40)
BASOPHILS # BLD AUTO: 0.1 X10E3/UL (ref 0–0.2)
BASOPHILS NFR BLD AUTO: 1 %
BILIRUB SERPL-MCNC: 0.3 MG/DL (ref 0–1.2)
BUN SERPL-MCNC: 14 MG/DL (ref 6–20)
BUN/CREAT SERPL: 21 (ref 9–23)
CALCIUM SERPL-MCNC: 9.6 MG/DL (ref 8.7–10.2)
CHLORIDE SERPL-SCNC: 108 MMOL/L (ref 96–106)
CO2 SERPL-SCNC: 20 MMOL/L (ref 20–29)
CREAT SERPL-MCNC: 0.68 MG/DL (ref 0.57–1)
EOSINOPHIL # BLD AUTO: 0.2 X10E3/UL (ref 0–0.4)
EOSINOPHIL NFR BLD AUTO: 2 %
ERYTHROCYTE [DISTWIDTH] IN BLOOD BY AUTOMATED COUNT: 16.4 % (ref 11.7–15.4)
EST. GFR  (NO RACE VARIABLE): 122 ML/MIN/1.73
FERRITIN SERPL-MCNC: 6 NG/ML (ref 15–150)
GLOBULIN SER CALC-MCNC: 2.4 G/DL (ref 1.5–4.5)
GLUCOSE SERPL-MCNC: 79 MG/DL (ref 70–99)
HCT VFR BLD AUTO: 34.3 % (ref 34–46.6)
HGB BLD-MCNC: 10.4 G/DL (ref 11.1–15.9)
IMM GRANULOCYTES NFR BLD AUTO: 1 %
IRON SATN MFR SERPL: 7 % (ref 15–55)
IRON SERPL-MCNC: 31 UG/DL (ref 27–159)
LYMPHOCYTES # BLD AUTO: 2.7 X10E3/UL (ref 0.7–3.1)
LYMPHOCYTES NFR BLD AUTO: 36 %
MCH RBC QN AUTO: 25.3 PG (ref 26.6–33)
MCHC RBC AUTO-ENTMCNC: 30.3 G/DL (ref 31.5–35.7)
MCV RBC AUTO: 84 FL (ref 79–97)
MONOCYTES # BLD AUTO: 0.5 X10E3/UL (ref 0.1–0.9)
MONOCYTES NFR BLD AUTO: 6 %
NEUTROPHILS # BLD AUTO: 4 X10E3/UL (ref 1.4–7)
NEUTROPHILS NFR BLD AUTO: 54 %
PLATELET # BLD AUTO: 283 X10E3/UL (ref 150–450)
POTASSIUM SERPL-SCNC: 4.4 MMOL/L (ref 3.5–5.2)
PROT SERPL-MCNC: 7 G/DL (ref 6–8.5)
RBC # BLD AUTO: 4.11 X10E6/UL (ref 3.77–5.28)
SODIUM SERPL-SCNC: 143 MMOL/L (ref 134–144)
TIBC SERPL-MCNC: 450 UG/DL (ref 250–450)
UIBC SERPL-MCNC: 419 UG/DL (ref 131–425)
WBC # BLD AUTO: 7.4 X10E3/UL (ref 3.4–10.8)

## 2024-02-05 ENCOUNTER — TELEPHONE (OUTPATIENT)
Dept: GASTROENTEROLOGY | Facility: CLINIC | Age: 29
End: 2024-02-05
Payer: COMMERCIAL

## 2024-02-05 DIAGNOSIS — D64.9 ANEMIA, UNSPECIFIED TYPE: Primary | ICD-10-CM

## 2024-02-05 LAB — CRP SERPL-MCNC: <3 MG/L (ref 0–10)

## 2024-02-05 RX ORDER — SODIUM CHLORIDE 0.9 % (FLUSH) 0.9 %
10 SYRINGE (ML) INJECTION
Status: CANCELLED | OUTPATIENT
Start: 2024-02-05

## 2024-02-05 RX ORDER — HEPARIN 100 UNIT/ML
500 SYRINGE INTRAVENOUS
Status: CANCELLED | OUTPATIENT
Start: 2024-02-05

## 2024-02-05 RX ORDER — DIPHENHYDRAMINE HYDROCHLORIDE 50 MG/ML
50 INJECTION INTRAMUSCULAR; INTRAVENOUS ONCE AS NEEDED
Status: CANCELLED | OUTPATIENT
Start: 2024-02-05

## 2024-02-05 RX ORDER — EPINEPHRINE 0.3 MG/.3ML
0.3 INJECTION SUBCUTANEOUS ONCE AS NEEDED
Status: CANCELLED | OUTPATIENT
Start: 2024-02-05

## 2024-02-05 NOTE — TELEPHONE ENCOUNTER
----- Message from Isidro Cadena MD sent at 2/5/2024 11:23 AM CST -----  She needs to get set up for IV iron infusions.  Thank you.

## 2024-02-23 ENCOUNTER — INFUSION (OUTPATIENT)
Dept: INFUSION THERAPY | Facility: HOSPITAL | Age: 29
End: 2024-02-23
Attending: INTERNAL MEDICINE
Payer: COMMERCIAL

## 2024-02-23 VITALS
OXYGEN SATURATION: 100 % | TEMPERATURE: 98 F | BODY MASS INDEX: 21.26 KG/M2 | HEART RATE: 68 BPM | DIASTOLIC BLOOD PRESSURE: 78 MMHG | WEIGHT: 120 LBS | HEIGHT: 63 IN | SYSTOLIC BLOOD PRESSURE: 116 MMHG

## 2024-02-23 DIAGNOSIS — D64.9 ANEMIA, UNSPECIFIED TYPE: Primary | ICD-10-CM

## 2024-02-23 PROCEDURE — 96365 THER/PROPH/DIAG IV INF INIT: CPT

## 2024-02-23 PROCEDURE — 25000003 PHARM REV CODE 250: Performed by: INTERNAL MEDICINE

## 2024-02-23 PROCEDURE — 63600175 PHARM REV CODE 636 W HCPCS: Performed by: INTERNAL MEDICINE

## 2024-02-23 RX ORDER — HEPARIN 100 UNIT/ML
500 SYRINGE INTRAVENOUS
Status: DISCONTINUED | OUTPATIENT
Start: 2024-02-23 | End: 2024-02-23 | Stop reason: HOSPADM

## 2024-02-23 RX ORDER — EPINEPHRINE 0.3 MG/.3ML
0.3 INJECTION SUBCUTANEOUS ONCE AS NEEDED
Status: CANCELLED | OUTPATIENT
Start: 2024-03-01

## 2024-02-23 RX ORDER — SODIUM CHLORIDE 0.9 % (FLUSH) 0.9 %
10 SYRINGE (ML) INJECTION
Status: DISCONTINUED | OUTPATIENT
Start: 2024-02-23 | End: 2024-02-23 | Stop reason: HOSPADM

## 2024-02-23 RX ORDER — SODIUM CHLORIDE 0.9 % (FLUSH) 0.9 %
10 SYRINGE (ML) INJECTION
Status: CANCELLED | OUTPATIENT
Start: 2024-03-01

## 2024-02-23 RX ORDER — DIPHENHYDRAMINE HYDROCHLORIDE 50 MG/ML
50 INJECTION INTRAMUSCULAR; INTRAVENOUS ONCE AS NEEDED
Status: CANCELLED | OUTPATIENT
Start: 2024-03-01

## 2024-02-23 RX ORDER — HEPARIN 100 UNIT/ML
500 SYRINGE INTRAVENOUS
Status: CANCELLED | OUTPATIENT
Start: 2024-03-01

## 2024-02-23 RX ADMIN — IRON SUCROSE 300 MG: 20 INJECTION, SOLUTION INTRAVENOUS at 09:02

## 2024-03-01 ENCOUNTER — INFUSION (OUTPATIENT)
Dept: INFUSION THERAPY | Facility: HOSPITAL | Age: 29
End: 2024-03-01
Attending: INTERNAL MEDICINE
Payer: COMMERCIAL

## 2024-03-01 VITALS
RESPIRATION RATE: 18 BRPM | TEMPERATURE: 99 F | DIASTOLIC BLOOD PRESSURE: 70 MMHG | WEIGHT: 120.81 LBS | HEIGHT: 63 IN | HEART RATE: 80 BPM | SYSTOLIC BLOOD PRESSURE: 115 MMHG | BODY MASS INDEX: 21.41 KG/M2 | OXYGEN SATURATION: 100 %

## 2024-03-01 DIAGNOSIS — D64.9 ANEMIA, UNSPECIFIED TYPE: Primary | ICD-10-CM

## 2024-03-01 PROCEDURE — 25000003 PHARM REV CODE 250: Performed by: INTERNAL MEDICINE

## 2024-03-01 PROCEDURE — 63600175 PHARM REV CODE 636 W HCPCS: Performed by: INTERNAL MEDICINE

## 2024-03-01 PROCEDURE — 96365 THER/PROPH/DIAG IV INF INIT: CPT

## 2024-03-01 RX ORDER — SODIUM CHLORIDE 0.9 % (FLUSH) 0.9 %
10 SYRINGE (ML) INJECTION
Status: CANCELLED | OUTPATIENT
Start: 2024-03-08

## 2024-03-01 RX ORDER — EPINEPHRINE 0.3 MG/.3ML
0.3 INJECTION SUBCUTANEOUS ONCE AS NEEDED
Status: CANCELLED | OUTPATIENT
Start: 2024-03-08

## 2024-03-01 RX ORDER — SODIUM CHLORIDE 0.9 % (FLUSH) 0.9 %
10 SYRINGE (ML) INJECTION
Status: DISCONTINUED | OUTPATIENT
Start: 2024-03-01 | End: 2024-03-01 | Stop reason: HOSPADM

## 2024-03-01 RX ORDER — HEPARIN 100 UNIT/ML
500 SYRINGE INTRAVENOUS
Status: DISCONTINUED | OUTPATIENT
Start: 2024-03-01 | End: 2024-03-01 | Stop reason: HOSPADM

## 2024-03-01 RX ORDER — DIPHENHYDRAMINE HYDROCHLORIDE 50 MG/ML
50 INJECTION INTRAMUSCULAR; INTRAVENOUS ONCE AS NEEDED
Status: CANCELLED | OUTPATIENT
Start: 2024-03-08

## 2024-03-01 RX ORDER — HEPARIN 100 UNIT/ML
500 SYRINGE INTRAVENOUS
Status: CANCELLED | OUTPATIENT
Start: 2024-03-08

## 2024-03-01 RX ADMIN — IRON SUCROSE 300 MG: 20 INJECTION, SOLUTION INTRAVENOUS at 08:03

## 2024-03-01 RX ADMIN — SODIUM CHLORIDE: 9 INJECTION, SOLUTION INTRAVENOUS at 08:03

## 2024-03-01 NOTE — PLAN OF CARE
Plan of care reviewed with patient; patient in agreement. #2/3 Venofer completed. Appts reviewed; patient uses portal.

## 2024-03-08 ENCOUNTER — INFUSION (OUTPATIENT)
Dept: INFUSION THERAPY | Facility: HOSPITAL | Age: 29
End: 2024-03-08
Attending: INTERNAL MEDICINE
Payer: COMMERCIAL

## 2024-03-08 VITALS
DIASTOLIC BLOOD PRESSURE: 63 MMHG | SYSTOLIC BLOOD PRESSURE: 91 MMHG | TEMPERATURE: 98 F | HEART RATE: 47 BPM | OXYGEN SATURATION: 100 % | RESPIRATION RATE: 18 BRPM

## 2024-03-08 DIAGNOSIS — D64.9 ANEMIA, UNSPECIFIED TYPE: Primary | ICD-10-CM

## 2024-03-08 PROCEDURE — 25000003 PHARM REV CODE 250: Performed by: INTERNAL MEDICINE

## 2024-03-08 PROCEDURE — 96365 THER/PROPH/DIAG IV INF INIT: CPT

## 2024-03-08 PROCEDURE — 63600175 PHARM REV CODE 636 W HCPCS: Performed by: INTERNAL MEDICINE

## 2024-03-08 RX ORDER — EPINEPHRINE 0.3 MG/.3ML
0.3 INJECTION SUBCUTANEOUS ONCE AS NEEDED
OUTPATIENT
Start: 2024-03-15

## 2024-03-08 RX ORDER — SODIUM CHLORIDE 0.9 % (FLUSH) 0.9 %
10 SYRINGE (ML) INJECTION
Status: DISCONTINUED | OUTPATIENT
Start: 2024-03-08 | End: 2024-03-08 | Stop reason: HOSPADM

## 2024-03-08 RX ORDER — DIPHENHYDRAMINE HYDROCHLORIDE 50 MG/ML
50 INJECTION INTRAMUSCULAR; INTRAVENOUS ONCE AS NEEDED
OUTPATIENT
Start: 2024-03-15

## 2024-03-08 RX ORDER — HEPARIN 100 UNIT/ML
500 SYRINGE INTRAVENOUS
OUTPATIENT
Start: 2024-03-15

## 2024-03-08 RX ORDER — SODIUM CHLORIDE 0.9 % (FLUSH) 0.9 %
10 SYRINGE (ML) INJECTION
OUTPATIENT
Start: 2024-03-15

## 2024-03-08 RX ORDER — HEPARIN 100 UNIT/ML
500 SYRINGE INTRAVENOUS
Status: DISCONTINUED | OUTPATIENT
Start: 2024-03-08 | End: 2024-03-08 | Stop reason: HOSPADM

## 2024-03-08 RX ADMIN — SODIUM CHLORIDE: 9 INJECTION, SOLUTION INTRAVENOUS at 08:03

## 2024-03-08 RX ADMIN — IRON SUCROSE 300 MG: 20 INJECTION, SOLUTION INTRAVENOUS at 08:03

## 2024-06-03 ENCOUNTER — TELEPHONE (OUTPATIENT)
Dept: GASTROENTEROLOGY | Facility: CLINIC | Age: 29
End: 2024-06-03
Payer: COMMERCIAL

## 2024-06-25 ENCOUNTER — PATIENT MESSAGE (OUTPATIENT)
Dept: GASTROENTEROLOGY | Facility: CLINIC | Age: 29
End: 2024-06-25
Payer: COMMERCIAL

## 2024-06-27 ENCOUNTER — LAB VISIT (OUTPATIENT)
Dept: LAB | Facility: HOSPITAL | Age: 29
End: 2024-06-27
Attending: INTERNAL MEDICINE
Payer: COMMERCIAL

## 2024-06-27 ENCOUNTER — OFFICE VISIT (OUTPATIENT)
Dept: GASTROENTEROLOGY | Facility: CLINIC | Age: 29
End: 2024-06-27
Payer: COMMERCIAL

## 2024-06-27 VITALS
BODY MASS INDEX: 20.71 KG/M2 | TEMPERATURE: 99 F | WEIGHT: 116.88 LBS | OXYGEN SATURATION: 100 % | HEIGHT: 63 IN | HEART RATE: 64 BPM | DIASTOLIC BLOOD PRESSURE: 71 MMHG | SYSTOLIC BLOOD PRESSURE: 101 MMHG

## 2024-06-27 DIAGNOSIS — K51.018 ULCERATIVE PANCOLITIS WITH OTHER COMPLICATION: ICD-10-CM

## 2024-06-27 DIAGNOSIS — D50.0 IRON DEFICIENCY ANEMIA DUE TO CHRONIC BLOOD LOSS: Primary | ICD-10-CM

## 2024-06-27 DIAGNOSIS — D50.0 IRON DEFICIENCY ANEMIA DUE TO CHRONIC BLOOD LOSS: ICD-10-CM

## 2024-06-27 LAB
ALBUMIN SERPL BCP-MCNC: 4.1 G/DL (ref 3.5–5.2)
ALP SERPL-CCNC: 47 U/L (ref 55–135)
ALT SERPL W/O P-5'-P-CCNC: 10 U/L (ref 10–44)
ANION GAP SERPL CALC-SCNC: 9 MMOL/L (ref 8–16)
AST SERPL-CCNC: 13 U/L (ref 10–40)
BASOPHILS # BLD AUTO: 0.04 K/UL (ref 0–0.2)
BASOPHILS NFR BLD: 0.5 % (ref 0–1.9)
BILIRUB SERPL-MCNC: 0.6 MG/DL (ref 0.1–1)
BUN SERPL-MCNC: 10 MG/DL (ref 6–20)
CALCIUM SERPL-MCNC: 9.4 MG/DL (ref 8.7–10.5)
CHLORIDE SERPL-SCNC: 109 MMOL/L (ref 95–110)
CO2 SERPL-SCNC: 20 MMOL/L (ref 23–29)
CREAT SERPL-MCNC: 0.8 MG/DL (ref 0.5–1.4)
CRP SERPL-MCNC: 0.9 MG/L (ref 0–8.2)
DIFFERENTIAL METHOD BLD: ABNORMAL
EOSINOPHIL # BLD AUTO: 0.3 K/UL (ref 0–0.5)
EOSINOPHIL NFR BLD: 3.6 % (ref 0–8)
ERYTHROCYTE [DISTWIDTH] IN BLOOD BY AUTOMATED COUNT: 13.1 % (ref 11.5–14.5)
EST. GFR  (NO RACE VARIABLE): >60 ML/MIN/1.73 M^2
FERRITIN SERPL-MCNC: 100 NG/ML (ref 20–300)
GLUCOSE SERPL-MCNC: 84 MG/DL (ref 70–110)
HCT VFR BLD AUTO: 40.5 % (ref 37–48.5)
HGB BLD-MCNC: 12.8 G/DL (ref 12–16)
IMM GRANULOCYTES # BLD AUTO: 0.04 K/UL (ref 0–0.04)
IMM GRANULOCYTES NFR BLD AUTO: 0.5 % (ref 0–0.5)
IRON SERPL-MCNC: 64 UG/DL (ref 30–160)
LYMPHOCYTES # BLD AUTO: 2.7 K/UL (ref 1–4.8)
LYMPHOCYTES NFR BLD: 32.3 % (ref 18–48)
MCH RBC QN AUTO: 29.8 PG (ref 27–31)
MCHC RBC AUTO-ENTMCNC: 31.6 G/DL (ref 32–36)
MCV RBC AUTO: 94 FL (ref 82–98)
MONOCYTES # BLD AUTO: 0.7 K/UL (ref 0.3–1)
MONOCYTES NFR BLD: 8.6 % (ref 4–15)
NEUTROPHILS # BLD AUTO: 4.6 K/UL (ref 1.8–7.7)
NEUTROPHILS NFR BLD: 54.5 % (ref 38–73)
NRBC BLD-RTO: 0 /100 WBC
PLATELET # BLD AUTO: 243 K/UL (ref 150–450)
PMV BLD AUTO: 12.2 FL (ref 9.2–12.9)
POTASSIUM SERPL-SCNC: 4 MMOL/L (ref 3.5–5.1)
PROT SERPL-MCNC: 6.7 G/DL (ref 6–8.4)
RBC # BLD AUTO: 4.29 M/UL (ref 4–5.4)
SATURATED IRON: 20 % (ref 20–50)
SODIUM SERPL-SCNC: 138 MMOL/L (ref 136–145)
TOTAL IRON BINDING CAPACITY: 326 UG/DL (ref 250–450)
TRANSFERRIN SERPL-MCNC: 220 MG/DL (ref 200–375)
WBC # BLD AUTO: 8.35 K/UL (ref 3.9–12.7)

## 2024-06-27 PROCEDURE — 3008F BODY MASS INDEX DOCD: CPT | Mod: CPTII,S$GLB,, | Performed by: INTERNAL MEDICINE

## 2024-06-27 PROCEDURE — 3074F SYST BP LT 130 MM HG: CPT | Mod: CPTII,S$GLB,, | Performed by: INTERNAL MEDICINE

## 2024-06-27 PROCEDURE — 86140 C-REACTIVE PROTEIN: CPT | Performed by: INTERNAL MEDICINE

## 2024-06-27 PROCEDURE — 82728 ASSAY OF FERRITIN: CPT | Performed by: INTERNAL MEDICINE

## 2024-06-27 PROCEDURE — G2211 COMPLEX E/M VISIT ADD ON: HCPCS | Mod: S$GLB,,, | Performed by: INTERNAL MEDICINE

## 2024-06-27 PROCEDURE — 83540 ASSAY OF IRON: CPT | Performed by: INTERNAL MEDICINE

## 2024-06-27 PROCEDURE — 99214 OFFICE O/P EST MOD 30 MIN: CPT | Mod: S$GLB,,, | Performed by: INTERNAL MEDICINE

## 2024-06-27 PROCEDURE — 36415 COLL VENOUS BLD VENIPUNCTURE: CPT | Performed by: INTERNAL MEDICINE

## 2024-06-27 PROCEDURE — 1159F MED LIST DOCD IN RCRD: CPT | Mod: CPTII,S$GLB,, | Performed by: INTERNAL MEDICINE

## 2024-06-27 PROCEDURE — 85025 COMPLETE CBC W/AUTO DIFF WBC: CPT | Performed by: INTERNAL MEDICINE

## 2024-06-27 PROCEDURE — 3078F DIAST BP <80 MM HG: CPT | Mod: CPTII,S$GLB,, | Performed by: INTERNAL MEDICINE

## 2024-06-27 PROCEDURE — 80053 COMPREHEN METABOLIC PANEL: CPT | Performed by: INTERNAL MEDICINE

## 2024-06-27 PROCEDURE — 1160F RVW MEDS BY RX/DR IN RCRD: CPT | Mod: CPTII,S$GLB,, | Performed by: INTERNAL MEDICINE

## 2024-06-27 RX ORDER — ONDANSETRON 4 MG/1
4 TABLET, ORALLY DISINTEGRATING ORAL EVERY 8 HOURS PRN
Qty: 90 TABLET | Refills: 0 | Status: SHIPPED | OUTPATIENT
Start: 2024-06-27

## 2024-06-27 RX ORDER — TRAMADOL HYDROCHLORIDE 50 MG/1
50 TABLET ORAL EVERY 6 HOURS PRN
COMMUNITY
Start: 2024-06-26

## 2024-06-27 NOTE — PROGRESS NOTES
[facility]          Inflammatory Bowel Disease Follow Up Note         TODAY'S VISIT DATE:  6/27/2024    Reason for Consult:    Chief Complaint   Patient presents with    Ulcerative Colitis       PCP: Karen, Primary Doctor      Referring MD:   No ref. provider found    History of Present Illness:  Alfonsoarnol Cassi Mitchell who is a 29 y.o. female is being seen today at the [facility] on 06/27/2024 for inflammatory bowel disease- ulcerative colitis.  She reports that she is doing fairly well.  In February her labs were consistent with iron deficiency anemia.  She received 3 doses of Venofer in March of 2024.  She feels like that helped quite a bit.  She noticed decreased fatigue relatively quickly after receiving the infusions.  She has continued with diet modifications including avoiding lactose and eating a primarily plant based diet and feels like this has helped significantly with a lot of her GI issues.  She is having less nausea but she does occasionally have nausea issues.  She takes an over-the-counter natural therapy for the nausea because Zofran and other antiemetics make her drowsy.  She feels like her nausea episodes are less severe but when she does have them they may be a little more severe in intensity.  Her stool frequency correlates with her diet.  She is currently having an increase in stool frequency but she is actually very comfortable with having more frequent bowel movements and does not feel like this is out of the ordinary for her.  She denies any other issues today.    IBD History:  She was diagnosed with ulcerative colitis in 2013.  It appears that she had primarily left-sided disease extending up to the splenic flexure.  She was treated with multiple medications including 5 ASA, azathioprine without improvement.  She had multiple courses of steroids that would help but she was unable to taper off them.  She was treated with  infliximab with unclear response but this was discontinued because of lack of insurance coverage.  She was also treated with Xeljanz that was not effective.  She underwent a total colectomy in June of 2020.  In November of 2020 she underwent completion proctectomy with IPAA creation.  Following this surgery she presented with abdominal pain was diagnosed with portal vein thrombosis and was treated with Eliquis.  In January 2021 a CT scan showed resolution of the portal vein thrombosis but did show a small fluid collection posterior to her J pouch that was drained by Interventional Radiology.  In February 2021 she underwent ileostomy takedown.  She did well clinically but in early 2023 she presented with significant nausea complaints and increasing stool frequency.  An EGD was unrevealing.  A pouchoscopy showed some ulceration along the staple line but no other significant inflammation was noted in the pouch or pre pouch ileum.  Biopsies did show some rectal cuff inflammation as well as some inflammation in the J pouch. In February her labs were consistent with iron deficiency anemia.  She received 3 doses of Venofer in March of 2024.    IBD Details:  Dx Date:  2013-left-sided ulcerative colitis  Disease type/distribution:  Ulcerative colitis-rectum to splenic flexure involvement  Current Treatment:  None Start Date:  Response:   Optimized:   Adverse reactions:   Prior surgeries:   June 2020-total colectomy with end ileostomy     November 2020-completion proctectomy with IPAA creation     February 2021-ileostomy takedown  CRP Elevation: Y   calprotectin: Unknown  Disease Complications:  Refractory disease  Extraintestinal manifestations:  Iron deficiency anemia  Prior treatments:   Steroids:  Good response to prednisone  5ASA:  Not effective  IMM:  Not effective  TNF Inh:  Infliximab-unclear response   Anti-Integrin:  None   IL 12/23:  None  MYRON Inh:  Xeljanz-not effective    Previous Clinical Trials:  None    Last  "Colonoscopy:  August [id number]-ninglxhyhx along the staple line but no other significant overt inflammation noted    Other Endoscopies:  August [id number]-fuhqafojqab normal    Imaging:   MRE:  None   CT:  Previous reports reviewed   Other:  No other pertinent imaging    Pertinent Labs:  Lab Results   Component Value Date    CRP 0.9 06/27/2024     No results found for: "TTGIGA", "IGA"  Lab Results   Component Value Date    TSH 0.555 08/09/2023     No results found for: "NTIDWHUQ66OX", "JOTDGLSK29"  Lab Results   Component Value Date    HEPCAB Negative 02/22/2021     Lab Results   Component Value Date    OXE85YMCS Negative 02/22/2021     No results found for: "NIL", "TBAG", "TBAGNIL", "MITOGENNIL", "TBGOLD", "TSPOTSCREN"  No results found for: "TPTMINTERP", "TPMTRESULT"  No results found for: "STOOLCULTURE", "TGIRDFVWGQ0K", "BDBCLTZCWV8K", "CDIFFICILEAN", "CDIFFTOX", "CDIFFICILEBY"  No results found for: "CALPROTECTIN"    Therapeutic Drug Monitoring Labs:  No results found for: "PROMETH"  No results found for: "ANSADAINIT", "INFLIXIMAB", "INFLIXINTERP"    Vaccinations:  No results found for: "HEPBSAB"  No results found for: "HEPAIGG"  No results found for: "VARICELLAZOS", "VARICELLAINT"  There is no immunization history for the selected administration types on file for this patient.      Review of Systems  Review of Systems   Constitutional:  Negative for chills, fever and weight loss.   HENT:  Negative for sore throat.    Eyes:  Negative for pain, discharge and redness.   Respiratory:  Negative for cough, shortness of breath and wheezing.    Cardiovascular:  Negative for chest pain, orthopnea and leg swelling.   Gastrointestinal:  Positive for nausea. Negative for abdominal pain, blood in stool, constipation, diarrhea, heartburn, melena and vomiting.   Genitourinary:  Negative for dysuria, frequency and urgency.   Musculoskeletal:  Positive for back pain. Negative for joint pain and myalgias.   Skin:  Negative " for itching and rash.   Neurological:  Negative for focal weakness and seizures.   Endo/Heme/Allergies:  Does not bruise/bleed easily.   Psychiatric/Behavioral:  Negative for depression. The patient is not nervous/anxious.        Medical History:   Past Medical History:   Diagnosis Date    ADD (attention deficit disorder)     Anxiety     Blood clot associated with vein wall inflammation     above liver, on eliquis x 6mo started in november after Jpouch creation    Chronic anemia     Murmur     Ulcerative colitis        Surgical History:  Past Surgical History:   Procedure Laterality Date    CLOSURE, ILEOSTOMY, LOOP N/A 2/18/2021    Procedure: CLOSURE, ILEOSTOMY, LOOP, ERAS low;  Surgeon: Delia Mehta MD;  Location: [facility] OR 2ND FLR;  Service: Colon and Rectal;  Laterality: N/A;    ESOPHAGOGASTRODUODENOSCOPY N/A 8/30/2023    Procedure: EGD (ESOPHAGOGASTRODUODENOSCOPY);  Surgeon: Isidro Cadena MD;  Location: [facility] (4TH FLR);  Service: Endoscopy;  Laterality: N/A;    EXAMINATION UNDER ANESTHESIA N/A 12/15/2020    Procedure: Exam under anesthesia;  Surgeon: Delia Mehta MD;  Location: [facility] OR [facility] FLR;  Service: Endoscopy;  Laterality: N/A;    EXAMINATION UNDER ANESTHESIA N/A 2/27/2021    Procedure: Exam under anesthesia;  Surgeon: Delia Mehta MD;  Location: [facility] OR [facility] FLR;  Service: Endoscopy;  Laterality: N/A;    FLEXIBLE SIGMOIDOSCOPY N/A 12/15/2020    Procedure: SIGMOIDOSCOPY, FLEXIBLE;  Surgeon: Delia Mehta MD;  Location: [facility] OR [facility] FLR;  Service: Endoscopy;  Laterality: N/A;    FLEXIBLE SIGMOIDOSCOPY N/A 2/27/2021    Procedure: SIGMOIDOSCOPY, FLEXIBLE;  Surgeon: Delia Mehta MD;  Location: [facility] OR [facility] FLR;  Service: Endoscopy;  Laterality: N/A;    LAPAROSCOPIC PROCTOCOLECTOMY WITH ILEOANAL ANASTOMOSIS, CREATION OF ILEAL POUCH, AND ILEOSTOMY N/A 11/5/2020    Procedure: PROCTECTOMY, LAPAROSCOPIC, WITH ILEOANAL ANASTOMOSIS, ILEAL POUCH CREATION, AND ILEOSTOMY CREATION,  "ERAS low;  Surgeon: Delia Mehta MD;  Location: [facility] OR 2ND FLR;  Service: Colon and Rectal;  Laterality: N/A;    LAPAROSCOPIC TOTAL COLECTOMY N/A 6/11/2020    Procedure: COLECTOMY, TOTAL, LAPAROSCOPIC, ERAS high and total abdominal colectomy;  Surgeon: Delia Mehta MD;  Location: [facility] OR 2ND FLR;  Service: Colon and Rectal;  Laterality: N/A;    POUCHOSCOPY N/A 8/30/2023    Procedure: ENDOSCOPY, POUCH, SMALL INTESTINE, DIAGNOSTIC;  Surgeon: Isidro Cadena MD;  Location: [facility] ENDO (4TH FLR);  Service: Endoscopy;  Laterality: N/A;  inst via portal       Family History:   Family History   Problem Relation Name Age of Onset    Breast cancer Neg Hx      Colon cancer Neg Hx      Ovarian cancer Neg Hx         Social History:   Social History     Tobacco Use    Smoking status: Never    Smokeless tobacco: Never   Substance Use Topics    Alcohol use: Never    Drug use: Never       Allergies: Reviewed    Home Medications:   Medication List with Changes/Refills   Current Medications    ESCITALOPRAM OXALATE (LEXAPRO) 20 MG TABLET    Take 20 mg by mouth.    HYDROXYZINE PAMOATE (VISTARIL) 25 MG CAP    Take 25 mg by mouth nightly as needed.    TRAMADOL (ULTRAM) 50 MG TABLET    Take 50 mg by mouth every 6 (six) hours as needed.    VYVANSE 20 MG CAPSULE    Take 20 mg by mouth every morning.   Changed and/or Refilled Medications    Modified Medication Previous Medication    ONDANSETRON (ZOFRAN-ODT) 4 MG TBDL ondansetron (ZOFRAN-ODT) 4 MG TbDL       Take 1 tablet (4 mg total) by mouth every 8 (eight) hours as needed.    Take 1 tablet (4 mg total) by mouth every 8 (eight) hours as needed.       Physical Exam:  Vital Signs:  /71 (BP Location: Left arm, Patient Position: Sitting, BP Method: Medium (Automatic))   Pulse 64   Temp 98.8 °F (37.1 °C) (Temporal)   Ht 5' 3" (1.6 m)   Wt 53 kg (116 lb 13.5 oz)   SpO2 100%   BMI 20.70 kg/m²   Body mass index is 20.7 kg/m².    Physical Exam  Vitals and nursing note " reviewed.   Constitutional:       General: She is not in acute distress.     Appearance: Normal appearance. She is well-developed. She is not ill-appearing or toxic-appearing.   Eyes:      Conjunctiva/sclera: Conjunctivae normal.      Pupils: Pupils are equal, round, and reactive to light.   Neck:      Thyroid: No thyromegaly.   Cardiovascular:      Rate and Rhythm: Normal rate and regular rhythm.      Heart sounds: Normal heart sounds. No murmur heard.  Pulmonary:      Effort: Pulmonary effort is normal.      Breath sounds: Normal breath sounds. No wheezing or rales.   Abdominal:      General: Bowel sounds are normal. There is no distension.      Palpations: Abdomen is soft. There is no mass.      Tenderness: There is no abdominal tenderness.   Musculoskeletal:         General: No tenderness. Normal range of motion.   Lymphadenopathy:      Cervical: No cervical adenopathy.   Skin:     Findings: No erythema or rash.   Neurological:      General: No focal deficit present.      Mental Status: She is alert and oriented to person, place, and time.   Psychiatric:         Mood and Affect: Mood normal.         Behavior: Behavior normal.         Thought Content: Thought content normal.         Judgment: Judgment normal.         Labs: reviewed and pertinent noted above    Assessment/Plan:  Alissa Mitchell is a 29 y.o. female with ulcerative colitis status post proctocolectomy with J pouch. The following issues were addresssed:    1. Iron deficiency anemia due to chronic blood loss    2. Ulcerative pancolitis with other complication      1. J pouch ulceration:  Likely related to mild ischemia.  Likely the cause of her iron deficiency.    2. Iron deficiency anemia:  Repeat CBC and iron studies today.  Received IV iron in March.    3. J pouch:  Overall the patch looked quite healthy other than some ulceration at the pouch inlet.  Stool output is stable.  Continue to monitor.    3. Nausea:  Continue current therapy.   Refill for Zofran was sent in just in case this is necessary.         # IBD specific health maintenance:  Colon cancer surveillance:  Up-to-date    Annual:  - Eye exam:  Not applicable  - Skin exam (if on IMM/TNF):  Recommend annual skin exam  - reminded pt to use sunblock/hats/sunprotective clothing  - PAP (if immunosuppressed):  Up-to-date    DEXA:  Not applicable    Vitamin D:  Check in the future    Vaccines:    Influenza:  Recommend annual vaccination   Pneumovax:  Review in the future   HAV:  Check serology in the future   HBV:  Vaccinated-Check serology in the future   Tdap:  Up-to-date   MMR:  Vaccinated-Check serologies in the future   VZV:  Vaccinated-Check serology in the future   HZV:  Not applicable   HPV:  Vaccinated   Meningococcus:  Review in the future   COVID: Review in the future    Follow up: Follow up in about 1 year (around 6/27/2025).    Visit today is associated with current or anticipated ongoing medical care related to this patient's single serious condition/complex condition (history of ulcerative colitis).      Thank you again for sending Alissa Mitchell to see Dr. Alexandru Cadena today at the [facility]. Please don't hesitate to contact Dr. Cadena if there are any questions regarding this evaluation, or if you have any other patients with inflammatory bowel disease for whom you would like a consultation. You can reach Dr. Cadena at [phone] or by email at [email or web address]    Isidro Cadena MD  Department of Gastroenterology  Inflammatory Bowel Disease

## 2024-06-27 NOTE — PROGRESS NOTES
IBD PATIENT INTAKE:    Confirm current PCP: No, Primary Doctor  If no PCP-  number given to establish [phone]: Yes    IBD THERAPY (name, dose/frequency): Zofran 4mg PRN    Antibiotics (past 30 Days):  No  If yes   Indication:  Name of antibiotic:  Completion date:       Answers submitted by the patient for this visit:  Established Patient Questionnaire  (Submitted on 6/26/2024)  nausea: Yes  Joint pain? : Yes  back pain: Yes

## 2025-06-24 ENCOUNTER — OFFICE VISIT (OUTPATIENT)
Dept: GASTROENTEROLOGY | Facility: CLINIC | Age: 30
End: 2025-06-24
Payer: COMMERCIAL

## 2025-06-24 ENCOUNTER — LAB VISIT (OUTPATIENT)
Dept: LAB | Facility: HOSPITAL | Age: 30
End: 2025-06-24
Attending: INTERNAL MEDICINE
Payer: COMMERCIAL

## 2025-06-24 ENCOUNTER — TELEPHONE (OUTPATIENT)
Dept: GASTROENTEROLOGY | Facility: CLINIC | Age: 30
End: 2025-06-24

## 2025-06-24 VITALS
TEMPERATURE: 98 F | HEART RATE: 77 BPM | DIASTOLIC BLOOD PRESSURE: 77 MMHG | SYSTOLIC BLOOD PRESSURE: 107 MMHG | BODY MASS INDEX: 21.76 KG/M2 | WEIGHT: 122.81 LBS | HEIGHT: 63 IN

## 2025-06-24 DIAGNOSIS — D50.0 IRON DEFICIENCY ANEMIA DUE TO CHRONIC BLOOD LOSS: ICD-10-CM

## 2025-06-24 DIAGNOSIS — K51.018 ULCERATIVE PANCOLITIS WITH OTHER COMPLICATION: Primary | ICD-10-CM

## 2025-06-24 LAB
ABSOLUTE EOSINOPHIL (OHS): 0.05 K/UL
ABSOLUTE MONOCYTE (OHS): 0.49 K/UL (ref 0.3–1)
ABSOLUTE NEUTROPHIL COUNT (OHS): 6.26 K/UL (ref 1.8–7.7)
ALBUMIN SERPL BCP-MCNC: 4.8 G/DL (ref 3.5–5.2)
ALP SERPL-CCNC: 44 UNIT/L (ref 40–150)
ALT SERPL W/O P-5'-P-CCNC: 15 UNIT/L (ref 10–44)
ANION GAP (OHS): 11 MMOL/L (ref 8–16)
AST SERPL-CCNC: 15 UNIT/L (ref 11–45)
BASOPHILS # BLD AUTO: 0.06 K/UL
BASOPHILS NFR BLD AUTO: 0.7 %
BILIRUB SERPL-MCNC: 0.6 MG/DL (ref 0.1–1)
BUN SERPL-MCNC: 8 MG/DL (ref 6–20)
CALCIUM SERPL-MCNC: 9.6 MG/DL (ref 8.7–10.5)
CHLORIDE SERPL-SCNC: 107 MMOL/L (ref 95–110)
CO2 SERPL-SCNC: 23 MMOL/L (ref 23–29)
CREAT SERPL-MCNC: 0.7 MG/DL (ref 0.5–1.4)
ERYTHROCYTE [DISTWIDTH] IN BLOOD BY AUTOMATED COUNT: 12.7 % (ref 11.5–14.5)
FERRITIN SERPL-MCNC: 38 NG/ML (ref 20–300)
GFR SERPLBLD CREATININE-BSD FMLA CKD-EPI: >60 ML/MIN/1.73/M2
GLUCOSE SERPL-MCNC: 79 MG/DL (ref 70–110)
HCT VFR BLD AUTO: 40.7 % (ref 37–48.5)
HGB BLD-MCNC: 12.9 GM/DL (ref 12–16)
IMM GRANULOCYTES # BLD AUTO: 0.03 K/UL (ref 0–0.04)
IMM GRANULOCYTES NFR BLD AUTO: 0.3 % (ref 0–0.5)
IRON SATN MFR SERPL: 13 % (ref 20–50)
IRON SERPL-MCNC: 61 UG/DL (ref 30–160)
LYMPHOCYTES # BLD AUTO: 2.13 K/UL (ref 1–4.8)
MCH RBC QN AUTO: 30.1 PG (ref 27–31)
MCHC RBC AUTO-ENTMCNC: 31.7 G/DL (ref 32–36)
MCV RBC AUTO: 95 FL (ref 82–98)
NUCLEATED RBC (/100WBC) (OHS): 0 /100 WBC
PLATELET # BLD AUTO: 248 K/UL (ref 150–450)
PMV BLD AUTO: 12.8 FL (ref 9.2–12.9)
POTASSIUM SERPL-SCNC: 3.6 MMOL/L (ref 3.5–5.1)
PROT SERPL-MCNC: 7.8 GM/DL (ref 6–8.4)
RBC # BLD AUTO: 4.29 M/UL (ref 4–5.4)
RELATIVE EOSINOPHIL (OHS): 0.6 %
RELATIVE LYMPHOCYTE (OHS): 23.6 % (ref 18–48)
RELATIVE MONOCYTE (OHS): 5.4 % (ref 4–15)
RELATIVE NEUTROPHIL (OHS): 69.4 % (ref 38–73)
SODIUM SERPL-SCNC: 141 MMOL/L (ref 136–145)
TIBC SERPL-MCNC: 460 UG/DL (ref 250–450)
TRANSFERRIN SERPL-MCNC: 311 MG/DL (ref 200–375)
VIT B12 SERPL-MCNC: 351 PG/ML (ref 210–950)
WBC # BLD AUTO: 9.02 K/UL (ref 3.9–12.7)

## 2025-06-24 PROCEDURE — 82728 ASSAY OF FERRITIN: CPT

## 2025-06-24 PROCEDURE — 82040 ASSAY OF SERUM ALBUMIN: CPT

## 2025-06-24 PROCEDURE — G2211 COMPLEX E/M VISIT ADD ON: HCPCS | Mod: S$GLB,,, | Performed by: INTERNAL MEDICINE

## 2025-06-24 PROCEDURE — 99214 OFFICE O/P EST MOD 30 MIN: CPT | Mod: S$GLB,,, | Performed by: INTERNAL MEDICINE

## 2025-06-24 PROCEDURE — 85025 COMPLETE CBC W/AUTO DIFF WBC: CPT

## 2025-06-24 PROCEDURE — 99999 PR PBB SHADOW E&M-EST. PATIENT-LVL III: CPT | Mod: PBBFAC,,, | Performed by: INTERNAL MEDICINE

## 2025-06-24 PROCEDURE — 1160F RVW MEDS BY RX/DR IN RCRD: CPT | Mod: CPTII,S$GLB,, | Performed by: INTERNAL MEDICINE

## 2025-06-24 PROCEDURE — 84466 ASSAY OF TRANSFERRIN: CPT

## 2025-06-24 PROCEDURE — 3008F BODY MASS INDEX DOCD: CPT | Mod: CPTII,S$GLB,, | Performed by: INTERNAL MEDICINE

## 2025-06-24 PROCEDURE — 3074F SYST BP LT 130 MM HG: CPT | Mod: CPTII,S$GLB,, | Performed by: INTERNAL MEDICINE

## 2025-06-24 PROCEDURE — 82607 VITAMIN B-12: CPT

## 2025-06-24 PROCEDURE — 36415 COLL VENOUS BLD VENIPUNCTURE: CPT

## 2025-06-24 PROCEDURE — 3078F DIAST BP <80 MM HG: CPT | Mod: CPTII,S$GLB,, | Performed by: INTERNAL MEDICINE

## 2025-06-24 PROCEDURE — 1159F MED LIST DOCD IN RCRD: CPT | Mod: CPTII,S$GLB,, | Performed by: INTERNAL MEDICINE

## 2025-06-24 NOTE — TELEPHONE ENCOUNTER
Patient is scheduled for a Pouchoscopy on 7/7/25 with Dr. MARCELINO Cadena  Referral for procedure from Clinic visit

## 2025-06-24 NOTE — PROGRESS NOTES
[facility]          Inflammatory Bowel Disease Follow Up Note         TODAY'S VISIT DATE:  6/24/2025    Reason for Consult:    No chief complaint on file.      PCP: No, Primary Doctor      Referring MD:   No ref. provider found    History of Present Illness:  Alfonsoarnol Cassi Mitchell who is a 30 y.o. female is being seen today at the [facility] on 06/24/2025 for inflammatory bowel disease- ulcerative colitis.  She is doing very well.  She denies any new problems.  If anything she is stable and actually doing better than she was last year.  She has been able to gain weight and actually add some muscle mass over the last year.  She has figured out some dietary factors that do seem to affect her bowel function.  These include chocolate and certain types of nuts.  She has started avoiding these foods in replacing them with other foods and has done well.  She typically has a about 8 bowel movements a day.  They tend to be loose.  She has 1-2 at night.  During her menstrual cycle she does have some more hard and formed stools that can cause some straining and difficulty with bowel movements.  She had labs done in the emergency room in November of last year that showed a normal hemoglobin.  This visit was unrelated to her pouch issues.  She denies any new complaints today.    IBD History:  She was diagnosed with ulcerative colitis in 2013.  It appears that she had primarily left-sided disease extending up to the splenic flexure.  She was treated with multiple medications including 5 ASA, azathioprine without improvement.  She had multiple courses of steroids that would help but she was unable to taper off them.  She was treated with infliximab with unclear response but this was discontinued because of lack of insurance coverage.  She was also treated with Xeljanz that was not effective.  She underwent a total colectomy in June of 2020.  In November of  2020 she underwent completion proctectomy with IPAA creation.  Following this surgery she presented with abdominal pain was diagnosed with portal vein thrombosis and was treated with Eliquis.  In January 2021 a CT scan showed resolution of the portal vein thrombosis but did show a small fluid collection posterior to her J pouch that was drained by Interventional Radiology.  In February 2021 she underwent ileostomy takedown.  She did well clinically but in early 2023 she presented with significant nausea complaints and increasing stool frequency.  An EGD was unrevealing.  A pouchoscopy showed some ulceration along the staple line but no other significant inflammation was noted in the pouch or pre pouch ileum.  Biopsies did show some rectal cuff inflammation as well as some inflammation in the J pouch. In February her labs were consistent with iron deficiency anemia.  She received 3 doses of Venofer in March of 2024.    IBD Details:  Dx Date:  2013-left-sided ulcerative colitis  Disease type/distribution:  Ulcerative colitis-rectum to splenic flexure involvement  Current Treatment:  None Start Date:  Response:   Optimized:   Adverse reactions:   Prior surgeries:   June 2020-total colectomy with end ileostomy     November 2020-completion proctectomy with IPAA creation     February 2021-ileostomy takedown  CRP Elevation: Y   calprotectin: Unknown  Disease Complications:  Refractory disease  Extraintestinal manifestations:  Iron deficiency anemia  Prior treatments:   Steroids:  Good response to prednisone  5ASA:  Not effective  IMM:  Not effective  TNF Inh:  Infliximab-unclear response   Anti-Integrin:  None   IL 12/23:  None  MYRON Inh:  Xeljanz-not effective    Previous Clinical Trials:  None    Last Colonoscopy:  August [id number] along the staple line but no other significant overt inflammation noted    Other Endoscopies:  August [id number] normal    Imaging:   MRE:  None   CT:  Previous  "reports reviewed   Other:  No other pertinent imaging    Pertinent Labs:  Lab Results   Component Value Date    CRP 0.9 06/27/2024     No results found for: "TTGIGA", "IGA"  Lab Results   Component Value Date    TSH 0.555 08/09/2023     No results found for: "EFZOKTDR24BD", "BPLMNTTN75"  Lab Results   Component Value Date    HEPCAB Negative 02/22/2021     Lab Results   Component Value Date    GWX36HOZR Negative 02/22/2021     No results found for: "NIL", "TBAG", "TBAGNIL", "MITOGENNIL", "TBGOLD", "TSPOTSCREN"  No results found for: "TPTMINTERP", "TPMTRESULT"  No results found for: "STOOLCULTURE", "TZCITZPNMK9N", "NZBIUJTOIT0P", "CDIFFICILEAN", "CDIFFTOX", "CDIFFICILEBY"  No results found for: "CALPROTECTIN"    Therapeutic Drug Monitoring Labs:  No results found for: "PROMETH"  No results found for: "ANSADAINIT", "INFLIXIMAB", "INFLIXINTERP"    Vaccinations:  No results found for: "HEPBSAB"  No results found for: "HEPAIGG"  No results found for: "VARICELLAZOS", "VARICELLAINT"  There is no immunization history for the selected administration types on file for this patient.      Review of Systems  Review of Systems   Constitutional:  Negative for chills, fever and weight loss.   HENT:  Negative for sore throat.    Eyes:  Negative for pain, discharge and redness.   Respiratory:  Negative for cough, shortness of breath and wheezing.    Cardiovascular:  Negative for chest pain, orthopnea and leg swelling.   Gastrointestinal:  Negative for abdominal pain, blood in stool, constipation, diarrhea, heartburn, melena, nausea and vomiting.   Genitourinary:  Negative for dysuria, frequency and urgency.   Musculoskeletal:  Positive for back pain. Negative for joint pain and myalgias.   Skin:  Negative for itching and rash.   Neurological:  Negative for focal weakness and seizures.   Endo/Heme/Allergies:  Does not bruise/bleed easily.   Psychiatric/Behavioral:  Negative for depression. The patient is not nervous/anxious.  "       Medical History:   Past Medical History:   Diagnosis Date    ADD (attention deficit disorder)     Anxiety     Blood clot associated with vein wall inflammation     above liver, on eliquis x 6mo started in november after Jpouch creation    Chronic anemia     Murmur     Ulcerative colitis        Surgical History:  Past Surgical History:   Procedure Laterality Date    CLOSURE, ILEOSTOMY, LOOP N/A 2/18/2021    Procedure: CLOSURE, ILEOSTOMY, LOOP, ERAS low;  Surgeon: Delia Mehta MD;  [facility];  Service: Colon and Rectal;  Laterality: N/A;    ESOPHAGOGASTRODUODENOSCOPY N/A 8/30/2023    Procedure: EGD (ESOPHAGOGASTRODUODENOSCOPY);  Surgeon: Isidro Cadena MD;  Location: [facility] (4TH FLR);  Service: Endoscopy;  Laterality: N/A;    EXAMINATION UNDER ANESTHESIA N/A 12/15/2020    Procedure: Exam under anesthesia;  Surgeon: Delia Mehta MD;  [facility];  Service: Endoscopy;  Laterality: N/A;    EXAMINATION UNDER ANESTHESIA N/A 2/27/2021    Procedure: Exam under anesthesia;  Surgeon: Delia Mehta MD;  [facility];  Service: Endoscopy;  Laterality: N/A;    FLEXIBLE SIGMOIDOSCOPY N/A 12/15/2020    Procedure: SIGMOIDOSCOPY, FLEXIBLE;  Surgeon: Delia Mehta MD;  [facility];  Service: Endoscopy;  Laterality: N/A;    FLEXIBLE SIGMOIDOSCOPY N/A 2/27/2021    Procedure: SIGMOIDOSCOPY, FLEXIBLE;  Surgeon: Delia Mehta MD;  [facility];  Service: Endoscopy;  Laterality: N/A;    LAPAROSCOPIC PROCTOCOLECTOMY WITH ILEOANAL ANASTOMOSIS, CREATION OF ILEAL POUCH, AND ILEOSTOMY N/A 11/5/2020    Procedure: PROCTECTOMY, LAPAROSCOPIC, WITH ILEOANAL ANASTOMOSIS, ILEAL POUCH CREATION, AND ILEOSTOMY CREATION, ERAS low;  Surgeon: Delia Mehta MD;  Location: [facility] OR 2ND FLR;  Service: Colon and Rectal;  Laterality: N/A;    LAPAROSCOPIC TOTAL COLECTOMY N/A 6/11/2020    Procedure: COLECTOMY, TOTAL, LAPAROSCOPIC, ERAS high and  "total abdominal colectomy;  Surgeon: Delia Mehta MD;  Location: [facility] OR 2ND FLR;  Service: Colon and Rectal;  Laterality: N/A;    POUCHOSCOPY N/A 8/30/2023    Procedure: ENDOSCOPY, POUCH, SMALL INTESTINE, DIAGNOSTIC;  Surgeon: Isidro Cadena MD;  Location: [facility] ENDO (4TH FLR);  Service: Endoscopy;  Laterality: N/A;  inst via portal       Family History:   Family History   Problem Relation Name Age of Onset    Breast cancer Neg Hx      Colon cancer Neg Hx      Ovarian cancer Neg Hx         Social History:   Social History     Tobacco Use    Smoking status: Never    Smokeless tobacco: Never   Substance Use Topics    Alcohol use: Never    Drug use: Never       Allergies: Reviewed    Home Medications:   Medication List with Changes/Refills   Current Medications    ESCITALOPRAM OXALATE (LEXAPRO) 20 MG TABLET    Take 20 mg by mouth.    ONDANSETRON (ZOFRAN-ODT) 4 MG TBDL    Take 1 tablet (4 mg total) by mouth every 8 (eight) hours as needed.    TRAMADOL (ULTRAM) 50 MG TABLET    Take 50 mg by mouth every 6 (six) hours as needed.   Discontinued Medications    HYDROXYZINE PAMOATE (VISTARIL) 25 MG CAP    Take 25 mg by mouth nightly as needed.    VYVANSE 20 MG CAPSULE    Take 20 mg by mouth every morning.       Physical Exam:  Vital Signs:  /77 (BP Location: Left arm)   Pulse 77   Temp 98.2 °F (36.8 °C) (Oral)   Ht 5' 3" (1.6 m)   Wt 55.7 kg (122 lb 12.7 oz)   BMI 21.75 kg/m²   Body mass index is 21.75 kg/m².    Physical Exam  Vitals and nursing note reviewed.   Constitutional:       General: She is not in acute distress.     Appearance: Normal appearance. She is well-developed. She is not ill-appearing or toxic-appearing.   Eyes:      Conjunctiva/sclera: Conjunctivae normal.      Pupils: Pupils are equal, round, and reactive to light.   Neck:      Thyroid: No thyromegaly.   Cardiovascular:      Rate and Rhythm: Normal rate and regular rhythm.      Heart sounds: Normal heart sounds. No murmur " heard.  Pulmonary:      Effort: Pulmonary effort is normal.      Breath sounds: Normal breath sounds. No wheezing or rales.   Abdominal:      General: Bowel sounds are normal. There is no distension.      Palpations: Abdomen is soft. There is no mass.      Tenderness: There is no abdominal tenderness.   Musculoskeletal:         General: No tenderness. Normal range of motion.   Lymphadenopathy:      Cervical: No cervical adenopathy.   Skin:     Findings: No erythema or rash.   Neurological:      General: No focal deficit present.      Mental Status: She is alert and oriented to person, place, and time.   Psychiatric:         Mood and Affect: Mood normal.         Behavior: Behavior normal.         Thought Content: Thought content normal.         Judgment: Judgment normal.         Labs: reviewed and pertinent noted above    Assessment/Plan:  Alissa Mitchell is a 30 y.o. female with ulcerative colitis status post proctocolectomy with J pouch. The following issues were addresssed:    1. Ulcerative pancolitis with other complication    2. Iron deficiency anemia due to chronic blood loss      1. J pouch ulceration:  Likely related to mild ischemia.  Plan for repeat pouchoscopy in the near future.    2. Iron deficiency anemia:  Repeat CBC and iron studies today.  Received IV iron in early 2024 with good response.    3. J pouch:  Repeat pouchoscopy for surveillance purposes in the near future.         # IBD specific health maintenance:  Colon cancer surveillance:  Up-to-date    Annual:  - Eye exam:  Not applicable  - Skin exam (if on IMM/TNF):  Recommend annual skin exam  - reminded pt to use sunblock/hats/sunprotective clothing  - PAP (if immunosuppressed):  Up-to-date    DEXA:  Not applicable    Vitamin D:  Check in the future    Vaccines:    Influenza:  Recommend annual vaccination   Pneumovax:  Review in the future   HAV:  Check serology in the future   HBV:  Vaccinated-Check serology in the future   Tdap:   Up-to-date   MMR:  Vaccinated-Check serologies in the future   VZV:  Vaccinated-Check serology in the future   HZV:  Not applicable   HPV:  Vaccinated   Meningococcus:  Review in the future   COVID: Review in the future    Follow up: Follow up in about 1 year (around 6/24/2026).    Visit today is associated with current or anticipated ongoing medical care related to this patient's single serious condition/complex condition (history of ulcerative colitis).      Thank you again for sending Alissa Mitchell to see Dr. Alexandru Cadena today at the [facility]. Please don't hesitate to contact Dr. Cadena if there are any questions regarding this evaluation, or if you have any other patients with inflammatory bowel disease for whom you would like a consultation. You can reach Dr. Cadena at [phone] or by email at [email or web address]    Isidro Cadena MD  Department of Gastroenterology  Inflammatory Bowel Disease

## 2025-06-25 ENCOUNTER — RESULTS FOLLOW-UP (OUTPATIENT)
Dept: GASTROENTEROLOGY | Facility: HOSPITAL | Age: 30
End: 2025-06-25

## 2025-06-25 NOTE — TELEPHONE ENCOUNTER
Called pt:  - reports she is going to be responsible to pay $4000 and is unable to pay at this time  -plans to have pouchoscopy next year  -v/u to reach out to us with any questions/concerns/ symptoms

## 2025-06-27 NOTE — TELEPHONE ENCOUNTER
Called pt to discuss PM sent by Dr. Cadena, v/u, grateful for information, will consider option to try Dr. Mehta since cost is the biggest concern.

## (undated) DEVICE — SEE MEDLINE ITEM 157181

## (undated) DEVICE — SEE MEDLINE ITEM 156902

## (undated) DEVICE — SEE MEDLINE ITEM 152622

## (undated) DEVICE — MARKER SKIN STND TIP BLUE BARR

## (undated) DEVICE — STAPLER RELOADABLE LINEAR 30M

## (undated) DEVICE — CART STAPLE RELD 30X3.5 BLU

## (undated) DEVICE — SEE MEDLINE ITEM 146347

## (undated) DEVICE — NDL 22GA X1 1/2 REG BEVEL

## (undated) DEVICE — GAUZE STRIP CURITY 180X2

## (undated) DEVICE — CUTTER PROXIMATE BLUE 75MM

## (undated) DEVICE — SUT CTD VICRYL 3-0 VIL BR

## (undated) DEVICE — RELOAD PROXIMATE CUT BLUE 75MM

## (undated) DEVICE — TRAY FOLEY 16FR INFECTION CONT

## (undated) DEVICE — NDL BOX COUNTER

## (undated) DEVICE — SET DECANTER MEDICHOICE

## (undated) DEVICE — LUBRICANT SURGILUBE 2 OZ

## (undated) DEVICE — DRESSING LEUKOPLAST FLEX 1X3IN

## (undated) DEVICE — SUT 3/0 27IN PDS II VIO MO

## (undated) DEVICE — SEE MEDLINE ITEM 157117

## (undated) DEVICE — ELECTRODE REM PLYHSV RETURN 9

## (undated) DEVICE — SUT PDS II 1 CT VIL MONO 36

## (undated) DEVICE — SUT COATED VICRYL 4/0 27IN

## (undated) DEVICE — SEE MEDLINE ITEM 157144

## (undated) DEVICE — SUT MONOCRYL 4-0 PS-1 UND

## (undated) DEVICE — SEE MEDLINE ITEM 154981

## (undated) DEVICE — TROCAR ENDOPATH XCEL 5MM 7.5CM

## (undated) DEVICE — SCISSOR 5MMX35CM DIRECT DRIVE

## (undated) DEVICE — SEE MEDLINE ITEM 146417

## (undated) DEVICE — LEGGINGS 48X31 INCH

## (undated) DEVICE — TRAY MINOR GEN SURG

## (undated) DEVICE — SEE MEDLINE ITEM 152487

## (undated) DEVICE — PANTIES FEMININE NAPKIN LG/XLG

## (undated) DEVICE — Device

## (undated) DEVICE — DRAPE INCISE IOBAN 2 23X17IN

## (undated) DEVICE — STAPLER ECHELON PWR CIR 29MM

## (undated) DEVICE — SUTURE MONOCRYL 1 CT-1

## (undated) DEVICE — SUT CTD VICRYL VIL BR CR/SH

## (undated) DEVICE — SYR ONLY LUER LOCK 20CC

## (undated) DEVICE — SUT 0 VICRYL / UR6 (J603)

## (undated) DEVICE — ADHESIVE SKN LIQUIBAND 0.8GM

## (undated) DEVICE — SOL NS 1000CC

## (undated) DEVICE — TUBE PENROSE DRAIN 12IN X 5/8I

## (undated) DEVICE — DRESSING ADH ISLAND 3.6 X 14

## (undated) DEVICE — ADHESIVE DERMABOND ADVANCED

## (undated) DEVICE — STAPLER DST GREEN 45X4.8MM

## (undated) DEVICE — SEE L#95700

## (undated) DEVICE — CONTAINER SPECIMEN STRL 4OZ

## (undated) DEVICE — SUT CTD VICRYL 2-0 VIL BR

## (undated) DEVICE — SUT CTD VICRYL VIL BR SH 27

## (undated) DEVICE — CUTTER PROXIMATE BLUE 100MM

## (undated) DEVICE — DRESSING TRANS 4X4 TEGADERM

## (undated) DEVICE — TUBING HF INSUFFLATION W/ FLTR

## (undated) DEVICE — KIT ANTIFOG

## (undated) DEVICE — DRAPE ABDOMINAL TIBURON 14X11

## (undated) DEVICE — POUCH SENSURA MIO 3/8X2 1/8IN

## (undated) DEVICE — TROCAR ENDOPATH XCEL 5X75MM

## (undated) DEVICE — SEE MEDLINE ITEM 157148

## (undated) DEVICE — STAPLER DST BLUE 45X3.5MM

## (undated) DEVICE — SUT 1 27IN PDS II VIO MONO

## (undated) DEVICE — SUT MONOSOF 2-0 18 BLK C-14

## (undated) DEVICE — ELECTRODE EXTENDED BLADE

## (undated) DEVICE — SUT ETHILON 2-0 BLK PS-2

## (undated) DEVICE — COVER LIGHT HANDLE 80/CA

## (undated) DEVICE — BOVIE SUCTION

## (undated) DEVICE — GAUZE SPONGE 4X4 12PLY

## (undated) DEVICE — BANDAGE ADHESIVE

## (undated) DEVICE — TAPE SILK 3IN